# Patient Record
Sex: FEMALE | Race: ASIAN | Employment: OTHER | URBAN - METROPOLITAN AREA
[De-identification: names, ages, dates, MRNs, and addresses within clinical notes are randomized per-mention and may not be internally consistent; named-entity substitution may affect disease eponyms.]

---

## 2017-01-12 ENCOUNTER — OFFICE VISIT (OUTPATIENT)
Dept: INTERNAL MEDICINE CLINIC | Facility: CLINIC | Age: 77
End: 2017-01-12

## 2017-01-12 VITALS
HEIGHT: 63 IN | OXYGEN SATURATION: 98 % | RESPIRATION RATE: 14 BRPM | HEART RATE: 74 BPM | BODY MASS INDEX: 24.1 KG/M2 | WEIGHT: 136 LBS | SYSTOLIC BLOOD PRESSURE: 136 MMHG | TEMPERATURE: 98 F | DIASTOLIC BLOOD PRESSURE: 72 MMHG

## 2017-01-12 DIAGNOSIS — I10 ESSENTIAL HYPERTENSION: Chronic | ICD-10-CM

## 2017-01-12 DIAGNOSIS — J20.9 ACUTE BRONCHITIS, UNSPECIFIED ORGANISM: Primary | ICD-10-CM

## 2017-01-12 DIAGNOSIS — E78.5 HYPERLIPIDEMIA, UNSPECIFIED HYPERLIPIDEMIA TYPE: Chronic | ICD-10-CM

## 2017-01-12 LAB — STREP GRP A CUL-SCR: NEGATIVE

## 2017-01-12 PROCEDURE — 99213 OFFICE O/P EST LOW 20 MIN: CPT | Performed by: INTERNAL MEDICINE

## 2017-01-12 RX ORDER — ALENDRONATE SODIUM 70 MG/1
70 TABLET ORAL WEEKLY
COMMUNITY
End: 2017-09-22

## 2017-01-12 RX ORDER — CODEINE PHOSPHATE AND GUAIFENESIN 10; 100 MG/5ML; MG/5ML
5 SOLUTION ORAL EVERY 6 HOURS PRN
Qty: 240 ML | Refills: 0 | Status: SHIPPED | OUTPATIENT
Start: 2017-01-12 | End: 2017-01-22

## 2017-01-12 NOTE — PROGRESS NOTES
Magaly Almodovar  1940 is a 78year old female who presents for upper respiratory symptoms    Patient presents with:  Cough: pt c/o a dry cough sore throat pt denies any fever x 5 days         HPI:   Pt reports  respiratory symptoms for few days  N suspicious lesions  EYES:PERRLA, EOMI intact,conjunctiva are clear  ENT: ears neg throat neg  NECK: supple, neg adenopathy,no bruits  LUNGS: clear to auscultation. air entry equal.no chest wall tenderness.  wheeze neg  CARDIO: RRR without murmur  GI: good BS

## 2017-01-16 RX ORDER — ATORVASTATIN CALCIUM 10 MG/1
TABLET, FILM COATED ORAL
Qty: 30 TABLET | Refills: 0 | Status: SHIPPED | OUTPATIENT
Start: 2017-01-16 | End: 2017-02-20

## 2017-02-16 ENCOUNTER — APPOINTMENT (OUTPATIENT)
Dept: LAB | Age: 77
End: 2017-02-16
Attending: INTERNAL MEDICINE
Payer: MEDICARE

## 2017-02-16 DIAGNOSIS — I10 ESSENTIAL HYPERTENSION: Chronic | ICD-10-CM

## 2017-02-16 DIAGNOSIS — E78.5 HYPERLIPIDEMIA, UNSPECIFIED HYPERLIPIDEMIA TYPE: Chronic | ICD-10-CM

## 2017-02-16 LAB
ALBUMIN SERPL-MCNC: 4 G/DL (ref 3.5–4.8)
ALP LIVER SERPL-CCNC: 55 U/L (ref 55–142)
ALT SERPL-CCNC: 33 U/L (ref 14–54)
AST SERPL-CCNC: 23 U/L (ref 15–41)
BILIRUB SERPL-MCNC: 0.6 MG/DL (ref 0.1–2)
BUN BLD-MCNC: 11 MG/DL (ref 8–20)
CALCIUM BLD-MCNC: 8.9 MG/DL (ref 8.3–10.3)
CHLORIDE: 106 MMOL/L (ref 101–111)
CHOLEST SMN-MCNC: 132 MG/DL (ref ?–200)
CO2: 29 MMOL/L (ref 22–32)
CREAT BLD-MCNC: 0.86 MG/DL (ref 0.55–1.02)
GLUCOSE BLD-MCNC: 114 MG/DL (ref 70–99)
HDLC SERPL-MCNC: 68 MG/DL (ref 45–?)
HDLC SERPL: 1.94 {RATIO} (ref ?–4.44)
LDLC SERPL CALC-MCNC: 46 MG/DL (ref ?–130)
M PROTEIN MFR SERPL ELPH: 7.5 G/DL (ref 6.1–8.3)
NONHDLC SERPL-MCNC: 64 MG/DL (ref ?–130)
POTASSIUM SERPL-SCNC: 4.6 MMOL/L (ref 3.6–5.1)
SODIUM SERPL-SCNC: 141 MMOL/L (ref 136–144)
TRIGLYCERIDES: 88 MG/DL (ref ?–150)
VLDL: 18 MG/DL (ref 5–40)

## 2017-02-16 PROCEDURE — 80061 LIPID PANEL: CPT | Performed by: INTERNAL MEDICINE

## 2017-02-16 PROCEDURE — 80053 COMPREHEN METABOLIC PANEL: CPT | Performed by: INTERNAL MEDICINE

## 2017-02-16 PROCEDURE — 36415 COLL VENOUS BLD VENIPUNCTURE: CPT | Performed by: INTERNAL MEDICINE

## 2017-02-20 DIAGNOSIS — R73.09 ELEVATED HEMOGLOBIN A1C: Primary | ICD-10-CM

## 2017-02-20 RX ORDER — ATORVASTATIN CALCIUM 10 MG/1
TABLET, FILM COATED ORAL
Qty: 30 TABLET | Refills: 0 | Status: SHIPPED | OUTPATIENT
Start: 2017-02-20 | End: 2017-03-21

## 2017-03-02 ENCOUNTER — APPOINTMENT (OUTPATIENT)
Dept: LAB | Age: 77
End: 2017-03-02
Attending: INTERNAL MEDICINE
Payer: MEDICARE

## 2017-03-02 ENCOUNTER — OFFICE VISIT (OUTPATIENT)
Dept: INTERNAL MEDICINE CLINIC | Facility: CLINIC | Age: 77
End: 2017-03-02

## 2017-03-02 VITALS
WEIGHT: 136.5 LBS | DIASTOLIC BLOOD PRESSURE: 96 MMHG | HEIGHT: 63 IN | OXYGEN SATURATION: 97 % | BODY MASS INDEX: 24.19 KG/M2 | TEMPERATURE: 98 F | SYSTOLIC BLOOD PRESSURE: 180 MMHG | RESPIRATION RATE: 16 BRPM | HEART RATE: 68 BPM

## 2017-03-02 DIAGNOSIS — I10 ESSENTIAL HYPERTENSION: Primary | Chronic | ICD-10-CM

## 2017-03-02 DIAGNOSIS — R73.09 ELEVATED HEMOGLOBIN A1C: ICD-10-CM

## 2017-03-02 LAB
EST. AVERAGE GLUCOSE BLD GHB EST-MCNC: 134 MG/DL (ref 68–126)
HBA1C MFR BLD HPLC: 6.3 % (ref ?–5.7)

## 2017-03-02 PROCEDURE — 36415 COLL VENOUS BLD VENIPUNCTURE: CPT

## 2017-03-02 PROCEDURE — 99213 OFFICE O/P EST LOW 20 MIN: CPT | Performed by: INTERNAL MEDICINE

## 2017-03-02 PROCEDURE — 83036 HEMOGLOBIN GLYCOSYLATED A1C: CPT

## 2017-03-02 RX ORDER — AMLODIPINE BESYLATE AND BENAZEPRIL HYDROCHLORIDE 5; 10 MG/1; MG/1
1 CAPSULE ORAL DAILY
Qty: 30 CAPSULE | Refills: 11 | Status: SHIPPED | OUTPATIENT
Start: 2017-03-02 | End: 2018-04-30

## 2017-03-02 RX ORDER — ATENOLOL 25 MG/1
50 TABLET ORAL
Qty: 90 TABLET | Refills: 1 | COMMUNITY
Start: 2017-03-02 | End: 2017-03-07

## 2017-03-02 NOTE — PATIENT INSTRUCTIONS
Patient take add on  meds today. HTN  Monitor blood pressure twice a day and send or call office with readings.

## 2017-03-02 NOTE — PROGRESS NOTES
Anderson Florian [facility] 1940 is a 78year old female.     Patient presents with:  Blood Pressure       HPI:   bp ck   Numbers have been high at home    Current Outpatient Prescriptions:  atenolol 25 MG Oral Tab Take 2 tablets (50 mg total) by mouth once d Distal Pulses Palpable: yes. Edema: none visible . Gallop: no .   Heart sounds: normal S1S2. Murmurs: none. Rhythm: regular. LUNGS:   Airflow: normal air movement. Auscultation: no wheezing/rhonchi/rales.    Breath sounds bilaterally: symmetri

## 2017-03-07 ENCOUNTER — OFFICE VISIT (OUTPATIENT)
Dept: INTERNAL MEDICINE CLINIC | Facility: CLINIC | Age: 77
End: 2017-03-07

## 2017-03-07 VITALS
BODY MASS INDEX: 24.23 KG/M2 | OXYGEN SATURATION: 98 % | HEIGHT: 63 IN | DIASTOLIC BLOOD PRESSURE: 80 MMHG | TEMPERATURE: 99 F | WEIGHT: 136.75 LBS | HEART RATE: 61 BPM | SYSTOLIC BLOOD PRESSURE: 126 MMHG | RESPIRATION RATE: 16 BRPM

## 2017-03-07 DIAGNOSIS — I10 ESSENTIAL HYPERTENSION: Primary | Chronic | ICD-10-CM

## 2017-03-07 PROCEDURE — 99213 OFFICE O/P EST LOW 20 MIN: CPT | Performed by: INTERNAL MEDICINE

## 2017-03-07 RX ORDER — ATENOLOL 50 MG/1
50 TABLET ORAL DAILY
Qty: 90 TABLET | Refills: 3 | COMMUNITY
Start: 2017-03-07 | End: 2017-03-16

## 2017-03-07 NOTE — PROGRESS NOTES
Chirag Ramos  1940 is a 78year old female. Patient presents with:   Follow - Up: blood pressure       HPI:   bp ck   Numbers have been high at home    Current Outpatient Prescriptions:  atenolol 50 MG Oral Tab Take 1 tablet (50 mg total) by unremarkable. Mouth: unremarkable. Nasal septum: midline. Pharynx: normal.   Sinuses: non-tender. HEART:   Clicks: no.   Distal Pulses Palpable: yes. Edema: none visible . Gallop: no .   Heart sounds: normal S1S2. Murmurs: none.    Rhythm: reg

## 2017-03-16 ENCOUNTER — OFFICE VISIT (OUTPATIENT)
Dept: INTERNAL MEDICINE CLINIC | Facility: CLINIC | Age: 77
End: 2017-03-16

## 2017-03-16 VITALS
WEIGHT: 137 LBS | HEART RATE: 63 BPM | DIASTOLIC BLOOD PRESSURE: 78 MMHG | TEMPERATURE: 98 F | SYSTOLIC BLOOD PRESSURE: 112 MMHG | OXYGEN SATURATION: 98 % | BODY MASS INDEX: 24 KG/M2 | RESPIRATION RATE: 14 BRPM

## 2017-03-16 DIAGNOSIS — I10 ESSENTIAL HYPERTENSION: Chronic | ICD-10-CM

## 2017-03-16 DIAGNOSIS — R05.9 COUGH: Primary | ICD-10-CM

## 2017-03-16 PROCEDURE — 99213 OFFICE O/P EST LOW 20 MIN: CPT | Performed by: INTERNAL MEDICINE

## 2017-03-16 RX ORDER — BUPRENORPHINE HCL 8 MG/1
1 TABLET SUBLINGUAL DAILY
COMMUNITY
End: 2020-09-17

## 2017-03-16 RX ORDER — LEVOFLOXACIN 500 MG/1
500 TABLET, FILM COATED ORAL DAILY
Qty: 10 TABLET | Refills: 0 | Status: SHIPPED | OUTPATIENT
Start: 2017-03-16 | End: 2017-03-26

## 2017-03-16 RX ORDER — ATENOLOL 50 MG/1
50 TABLET ORAL DAILY
Qty: 90 TABLET | Refills: 3 | Status: SHIPPED | OUTPATIENT
Start: 2017-03-16 | End: 2017-09-15 | Stop reason: RX

## 2017-03-16 NOTE — PROGRESS NOTES
Chirag Ramos  1940 is a 78year old female who presents for upper respiratory symptoms    Patient presents with:  Cough: pt c/o a productive cough and a sore throat pt denies any fever        HPI:   Pt reports  respiratory symptoms for few days pain  NEURO: denies headaches    EXAM:   /78 mmHg  Pulse 63  Temp(Src) 98.3 °F (36.8 °C) (Oral)  Resp 14  Wt 137 lb  SpO2 98%  Breastfeeding?  No  GENERAL: well developed, well nourished,in no apparent distress  SKIN: no rashes,no suspicious lesions

## 2017-03-21 RX ORDER — ATORVASTATIN CALCIUM 10 MG/1
TABLET, FILM COATED ORAL
Qty: 30 TABLET | Refills: 0 | Status: SHIPPED | OUTPATIENT
Start: 2017-03-21 | End: 2017-07-16

## 2017-07-17 RX ORDER — ATORVASTATIN CALCIUM 10 MG/1
TABLET, FILM COATED ORAL
Qty: 90 TABLET | Refills: 0 | Status: SHIPPED | OUTPATIENT
Start: 2017-07-17 | End: 2017-10-20

## 2017-08-28 ENCOUNTER — TELEPHONE (OUTPATIENT)
Dept: INTERNAL MEDICINE CLINIC | Facility: CLINIC | Age: 77
End: 2017-08-28

## 2017-08-28 DIAGNOSIS — E78.5 HYPERLIPIDEMIA, UNSPECIFIED HYPERLIPIDEMIA TYPE: Chronic | ICD-10-CM

## 2017-08-28 DIAGNOSIS — R73.09 ELEVATED HEMOGLOBIN A1C: ICD-10-CM

## 2017-08-28 DIAGNOSIS — Z00.00 BLOOD TESTS FOR ROUTINE GENERAL PHYSICAL EXAMINATION: Primary | ICD-10-CM

## 2017-08-28 DIAGNOSIS — E55.9 VITAMIN D DEFICIENCY: Chronic | ICD-10-CM

## 2017-08-28 NOTE — TELEPHONE ENCOUNTER
Patient requesting her lab work orders (6 month re-check) to be inputted in the system. Please call pt with order status.

## 2017-08-30 ENCOUNTER — LAB ENCOUNTER (OUTPATIENT)
Dept: LAB | Age: 77
End: 2017-08-30
Attending: INTERNAL MEDICINE
Payer: MEDICARE

## 2017-08-30 DIAGNOSIS — E78.5 HYPERLIPIDEMIA, UNSPECIFIED HYPERLIPIDEMIA TYPE: Chronic | ICD-10-CM

## 2017-08-30 DIAGNOSIS — Z00.00 BLOOD TESTS FOR ROUTINE GENERAL PHYSICAL EXAMINATION: ICD-10-CM

## 2017-08-30 DIAGNOSIS — R73.09 ELEVATED HEMOGLOBIN A1C: ICD-10-CM

## 2017-08-30 DIAGNOSIS — E55.9 VITAMIN D DEFICIENCY: Chronic | ICD-10-CM

## 2017-08-30 LAB
25-HYDROXYVITAMIN D (TOTAL): 43.3 NG/ML (ref 30–100)
ALBUMIN SERPL-MCNC: 3.9 G/DL (ref 3.5–4.8)
ALP LIVER SERPL-CCNC: 54 U/L (ref 55–142)
ALT SERPL-CCNC: 27 U/L (ref 14–54)
AST SERPL-CCNC: 16 U/L (ref 15–41)
BASOPHILS # BLD AUTO: 0.06 X10(3) UL (ref 0–0.1)
BASOPHILS NFR BLD AUTO: 1.1 %
BILIRUB SERPL-MCNC: 0.6 MG/DL (ref 0.1–2)
BILIRUB UR QL STRIP.AUTO: NEGATIVE
BUN BLD-MCNC: 14 MG/DL (ref 8–20)
CALCIUM BLD-MCNC: 9.1 MG/DL (ref 8.3–10.3)
CHLORIDE: 107 MMOL/L (ref 101–111)
CHOLEST SMN-MCNC: 139 MG/DL (ref ?–200)
CLARITY UR REFRACT.AUTO: CLEAR
CO2: 28 MMOL/L (ref 22–32)
COLOR UR AUTO: YELLOW
CREAT BLD-MCNC: 0.83 MG/DL (ref 0.55–1.02)
EOSINOPHIL # BLD AUTO: 0.19 X10(3) UL (ref 0–0.3)
EOSINOPHIL NFR BLD AUTO: 3.6 %
ERYTHROCYTE [DISTWIDTH] IN BLOOD BY AUTOMATED COUNT: 12.5 % (ref 11.5–16)
EST. AVERAGE GLUCOSE BLD GHB EST-MCNC: 131 MG/DL (ref 68–126)
GLUCOSE BLD-MCNC: 106 MG/DL (ref 70–99)
GLUCOSE UR STRIP.AUTO-MCNC: NEGATIVE MG/DL
HBA1C MFR BLD HPLC: 6.2 % (ref ?–5.7)
HCT VFR BLD AUTO: 41 % (ref 34–50)
HDLC SERPL-MCNC: 66 MG/DL (ref 45–?)
HDLC SERPL: 2.11 {RATIO} (ref ?–4.44)
HGB BLD-MCNC: 13.2 G/DL (ref 12–16)
IMMATURE GRANULOCYTE COUNT: 0.01 X10(3) UL (ref 0–1)
IMMATURE GRANULOCYTE RATIO %: 0.2 %
KETONES UR STRIP.AUTO-MCNC: NEGATIVE MG/DL
LDLC SERPL CALC-MCNC: 53 MG/DL (ref ?–130)
LDLC SERPL-MCNC: 20 MG/DL (ref 5–40)
LEUKOCYTE ESTERASE UR QL STRIP.AUTO: NEGATIVE
LYMPHOCYTES # BLD AUTO: 1.57 X10(3) UL (ref 0.9–4)
LYMPHOCYTES NFR BLD AUTO: 29.8 %
M PROTEIN MFR SERPL ELPH: 7.7 G/DL (ref 6.1–8.3)
MCH RBC QN AUTO: 32.2 PG (ref 27–33.2)
MCHC RBC AUTO-ENTMCNC: 32.2 G/DL (ref 31–37)
MCV RBC AUTO: 100 FL (ref 81–100)
MONOCYTES # BLD AUTO: 0.53 X10(3) UL (ref 0.1–0.6)
MONOCYTES NFR BLD AUTO: 10.1 %
NEUTROPHIL ABS PRELIM: 2.91 X10 (3) UL (ref 1.3–6.7)
NEUTROPHILS # BLD AUTO: 2.91 X10(3) UL (ref 1.3–6.7)
NEUTROPHILS NFR BLD AUTO: 55.2 %
NITRITE UR QL STRIP.AUTO: NEGATIVE
NONHDLC SERPL-MCNC: 73 MG/DL (ref ?–130)
PH UR STRIP.AUTO: 7 [PH] (ref 4.5–8)
PLATELET # BLD AUTO: 218 10(3)UL (ref 150–450)
POTASSIUM SERPL-SCNC: 4.5 MMOL/L (ref 3.6–5.1)
PROT UR STRIP.AUTO-MCNC: NEGATIVE MG/DL
RBC # BLD AUTO: 4.1 X10(6)UL (ref 3.8–5.1)
RED CELL DISTRIBUTION WIDTH-SD: 46.3 FL (ref 35.1–46.3)
SODIUM SERPL-SCNC: 144 MMOL/L (ref 136–144)
SP GR UR STRIP.AUTO: 1.01 (ref 1–1.03)
THYROXINE (T4): 9 UG/DL (ref 4.5–10.9)
TRIGLYCERIDES: 102 MG/DL (ref ?–150)
UROBILINOGEN UR STRIP.AUTO-MCNC: <2 MG/DL
WBC # BLD AUTO: 5.3 X10(3) UL (ref 4–13)

## 2017-08-30 PROCEDURE — 83036 HEMOGLOBIN GLYCOSYLATED A1C: CPT | Performed by: INTERNAL MEDICINE

## 2017-08-30 PROCEDURE — 80061 LIPID PANEL: CPT | Performed by: INTERNAL MEDICINE

## 2017-08-30 PROCEDURE — 81001 URINALYSIS AUTO W/SCOPE: CPT | Performed by: INTERNAL MEDICINE

## 2017-08-30 PROCEDURE — 36415 COLL VENOUS BLD VENIPUNCTURE: CPT | Performed by: INTERNAL MEDICINE

## 2017-08-30 PROCEDURE — 85025 COMPLETE CBC W/AUTO DIFF WBC: CPT | Performed by: INTERNAL MEDICINE

## 2017-08-30 PROCEDURE — 82306 VITAMIN D 25 HYDROXY: CPT | Performed by: INTERNAL MEDICINE

## 2017-08-30 PROCEDURE — 80053 COMPREHEN METABOLIC PANEL: CPT | Performed by: INTERNAL MEDICINE

## 2017-08-30 PROCEDURE — 84436 ASSAY OF TOTAL THYROXINE: CPT | Performed by: INTERNAL MEDICINE

## 2017-09-15 ENCOUNTER — TELEPHONE (OUTPATIENT)
Dept: INTERNAL MEDICINE CLINIC | Facility: CLINIC | Age: 77
End: 2017-09-15

## 2017-09-15 RX ORDER — METOPROLOL SUCCINATE 50 MG/1
50 TABLET, EXTENDED RELEASE ORAL DAILY
Qty: 90 TABLET | Refills: 3 | Status: SHIPPED | OUTPATIENT
Start: 2017-09-15 | End: 2018-09-03

## 2017-09-15 NOTE — TELEPHONE ENCOUNTER
[facility] called to request change of medication. Pharmacy suggested Atenolol be changed to Metoprolol.

## 2017-09-19 DIAGNOSIS — M85.80 OSTEOPENIA: ICD-10-CM

## 2017-09-19 RX ORDER — CHOLECALCIFEROL (VITAMIN D3) 125 MCG
CAPSULE ORAL
Qty: 90 TABLET | Refills: 0 | Status: SHIPPED | OUTPATIENT
Start: 2017-09-19 | End: 2017-11-13

## 2017-09-22 ENCOUNTER — OFFICE VISIT (OUTPATIENT)
Dept: INTERNAL MEDICINE CLINIC | Facility: CLINIC | Age: 77
End: 2017-09-22

## 2017-09-22 VITALS
HEIGHT: 63 IN | RESPIRATION RATE: 16 BRPM | HEART RATE: 76 BPM | SYSTOLIC BLOOD PRESSURE: 116 MMHG | WEIGHT: 134.5 LBS | DIASTOLIC BLOOD PRESSURE: 68 MMHG | BODY MASS INDEX: 23.83 KG/M2 | OXYGEN SATURATION: 98 % | TEMPERATURE: 98 F

## 2017-09-22 DIAGNOSIS — Z12.31 ENCOUNTER FOR SCREENING MAMMOGRAM FOR MALIGNANT NEOPLASM OF BREAST: ICD-10-CM

## 2017-09-22 DIAGNOSIS — I10 ESSENTIAL HYPERTENSION: Chronic | ICD-10-CM

## 2017-09-22 DIAGNOSIS — Z00.00 ROUTINE GENERAL MEDICAL EXAMINATION AT A HEALTH CARE FACILITY: Primary | ICD-10-CM

## 2017-09-22 PROCEDURE — G0009 ADMIN PNEUMOCOCCAL VACCINE: HCPCS | Performed by: INTERNAL MEDICINE

## 2017-09-22 PROCEDURE — 90670 PCV13 VACCINE IM: CPT | Performed by: INTERNAL MEDICINE

## 2017-09-22 PROCEDURE — 93000 ELECTROCARDIOGRAM COMPLETE: CPT | Performed by: INTERNAL MEDICINE

## 2017-09-22 PROCEDURE — G0439 PPPS, SUBSEQ VISIT: HCPCS | Performed by: INTERNAL MEDICINE

## 2017-09-22 NOTE — PROGRESS NOTES
Leighann GUPTA 1940 is a 78year old female.     Patient presents with:  Physical      HPI:   See below     Current Outpatient Prescriptions:  VITAMIN D3 2000 units Oral Tab TAKE 1 CAPSULE BY MOUTH DAILY Disp: 90 tablet Rfl: 0   Metoprolol Succin exertion), chest congestion, blood-tinged sputum,wheezing. Breathing normal pattern . Cardiovascular:   Patient denies chest pain, shortness of breath/rheumatic fever/murmur/dizzziness cholesterol normal. Leg edema none. Orthopnea none.  Palpitations none BREASTS:   Appearance: symmetrical.   tenderness none. Axilla: unremarkable. Breast Mass: no palpable masses. General: unremarkable. Nipple Abnormality: none. Skin Change: none. LUNGS:   Auscultation: clear .    Chest Shape: normal .   Percuss 3/22/2018).   Beverly Layne MD

## 2017-10-20 RX ORDER — ATORVASTATIN CALCIUM 10 MG/1
TABLET, FILM COATED ORAL
Qty: 90 TABLET | Refills: 0 | Status: SHIPPED | OUTPATIENT
Start: 2017-10-20 | End: 2017-11-13

## 2017-11-13 DIAGNOSIS — M85.80 OSTEOPENIA: ICD-10-CM

## 2017-11-13 RX ORDER — ATORVASTATIN CALCIUM 10 MG/1
TABLET, FILM COATED ORAL
Qty: 90 TABLET | Refills: 0 | Status: SHIPPED | OUTPATIENT
Start: 2017-11-13 | End: 2018-03-27

## 2017-11-13 RX ORDER — CHOLECALCIFEROL (VITAMIN D3) 125 MCG
CAPSULE ORAL
Qty: 90 TABLET | Refills: 0 | Status: SHIPPED | OUTPATIENT
Start: 2017-11-13 | End: 2018-04-02

## 2018-03-28 RX ORDER — ATORVASTATIN CALCIUM 10 MG/1
TABLET, FILM COATED ORAL
Qty: 90 TABLET | Refills: 0 | Status: SHIPPED | OUTPATIENT
Start: 2018-03-28 | End: 2018-04-02

## 2018-04-02 ENCOUNTER — APPOINTMENT (OUTPATIENT)
Dept: LAB | Age: 78
End: 2018-04-02
Attending: INTERNAL MEDICINE
Payer: MEDICARE

## 2018-04-02 ENCOUNTER — OFFICE VISIT (OUTPATIENT)
Dept: INTERNAL MEDICINE CLINIC | Facility: CLINIC | Age: 78
End: 2018-04-02

## 2018-04-02 VITALS
SYSTOLIC BLOOD PRESSURE: 142 MMHG | DIASTOLIC BLOOD PRESSURE: 84 MMHG | WEIGHT: 137 LBS | BODY MASS INDEX: 24 KG/M2 | RESPIRATION RATE: 16 BRPM | HEART RATE: 62 BPM | OXYGEN SATURATION: 97 % | TEMPERATURE: 98 F

## 2018-04-02 DIAGNOSIS — E78.5 HYPERLIPIDEMIA, UNSPECIFIED HYPERLIPIDEMIA TYPE: Chronic | ICD-10-CM

## 2018-04-02 DIAGNOSIS — R73.03 PREDIABETES: ICD-10-CM

## 2018-04-02 DIAGNOSIS — I10 ESSENTIAL HYPERTENSION: Primary | Chronic | ICD-10-CM

## 2018-04-02 DIAGNOSIS — I10 ESSENTIAL HYPERTENSION: Chronic | ICD-10-CM

## 2018-04-02 PROCEDURE — 36415 COLL VENOUS BLD VENIPUNCTURE: CPT | Performed by: INTERNAL MEDICINE

## 2018-04-02 PROCEDURE — 83036 HEMOGLOBIN GLYCOSYLATED A1C: CPT | Performed by: INTERNAL MEDICINE

## 2018-04-02 PROCEDURE — 80048 BASIC METABOLIC PNL TOTAL CA: CPT | Performed by: INTERNAL MEDICINE

## 2018-04-02 PROCEDURE — 99213 OFFICE O/P EST LOW 20 MIN: CPT | Performed by: INTERNAL MEDICINE

## 2018-04-02 PROCEDURE — 80061 LIPID PANEL: CPT | Performed by: INTERNAL MEDICINE

## 2018-04-02 RX ORDER — ATORVASTATIN CALCIUM 10 MG/1
10 TABLET, FILM COATED ORAL
Qty: 90 TABLET | Refills: 3 | Status: SHIPPED | OUTPATIENT
Start: 2018-04-02 | End: 2019-10-15

## 2018-04-02 RX ORDER — LOSARTAN POTASSIUM 50 MG/1
50 TABLET ORAL DAILY
Qty: 30 TABLET | Refills: 2 | Status: SHIPPED | OUTPATIENT
Start: 2018-04-02 | End: 2018-04-30 | Stop reason: ALTCHOICE

## 2018-04-02 RX ORDER — METOPROLOL SUCCINATE 50 MG/1
TABLET, EXTENDED RELEASE ORAL
Refills: 3 | COMMUNITY
Start: 2018-02-05 | End: 2018-04-02

## 2018-04-02 RX ORDER — CHOLECALCIFEROL (VITAMIN D3) 125 MCG
1 CAPSULE ORAL
Qty: 90 TABLET | Refills: 3 | Status: SHIPPED | OUTPATIENT
Start: 2018-04-02 | End: 2019-07-18

## 2018-04-02 RX ORDER — METOPROLOL SUCCINATE 50 MG/1
50 TABLET, EXTENDED RELEASE ORAL DAILY
Refills: 3 | COMMUNITY
Start: 2018-04-02 | End: 2018-09-10

## 2018-04-02 NOTE — PROGRESS NOTES
Stephon Lomas  1940 is a 78year old female. Patient presents with: Follow - Up       HPI:   bp ck     Current Outpatient Prescriptions:  aspirin 81 MG Oral Tab Take 1 tablet (81 mg total) by mouth once daily.  Disp: 90 tablet Rfl: 3   Metopr HEART:   Clicks: no.   Distal Pulses Palpable: yes. Edema: none visible . Gallop: no .   Heart sounds: normal S1S2. Murmurs: none. Rhythm: regular. LUNGS:   Airflow: normal air movement. Auscultation: no wheezing/rhonchi/rales.    Breath sound

## 2018-04-30 ENCOUNTER — OFFICE VISIT (OUTPATIENT)
Dept: INTERNAL MEDICINE CLINIC | Facility: CLINIC | Age: 78
End: 2018-04-30

## 2018-04-30 VITALS — HEART RATE: 66 BPM | DIASTOLIC BLOOD PRESSURE: 84 MMHG | SYSTOLIC BLOOD PRESSURE: 146 MMHG

## 2018-04-30 DIAGNOSIS — I10 ESSENTIAL HYPERTENSION: Primary | Chronic | ICD-10-CM

## 2018-04-30 DIAGNOSIS — R73.03 PREDIABETES: ICD-10-CM

## 2018-04-30 PROCEDURE — 99213 OFFICE O/P EST LOW 20 MIN: CPT | Performed by: INTERNAL MEDICINE

## 2018-04-30 RX ORDER — AMLODIPINE BESYLATE AND BENAZEPRIL HYDROCHLORIDE 5; 10 MG/1; MG/1
1 CAPSULE ORAL DAILY
Qty: 30 CAPSULE | Refills: 11 | Status: SHIPPED | OUTPATIENT
Start: 2018-04-30 | End: 2018-05-11

## 2018-04-30 NOTE — PROGRESS NOTES
Chirag Ramos  1940 is a 78year old female. No chief complaint on file. HPI:   bp ck     Current Outpatient Prescriptions:  Amlodipine Besy-Benazepril HCl 5-10 MG Oral Cap Take 1 capsule by mouth daily.  Disp: 30 capsule Rfl: 11   aspir sounds: normal S1S2. Murmurs: none. Rhythm: regular. LUNGS:   Airflow: normal air movement. Auscultation: no wheezing/rhonchi/rales.    Breath sounds bilaterally: symmetrical.       ASSESSMENT AND PLAN:   Diagnoses and all orders for this visit:

## 2018-05-07 ENCOUNTER — TELEPHONE (OUTPATIENT)
Dept: INTERNAL MEDICINE CLINIC | Facility: CLINIC | Age: 78
End: 2018-05-07

## 2018-05-07 DIAGNOSIS — I10 ESSENTIAL HYPERTENSION: Chronic | ICD-10-CM

## 2018-05-07 RX ORDER — AMLODIPINE BESYLATE AND BENAZEPRIL HYDROCHLORIDE 5; 10 MG/1; MG/1
1 CAPSULE ORAL DAILY
Qty: 30 CAPSULE | Refills: 11 | OUTPATIENT
Start: 2018-05-07 | End: 2019-05-02

## 2018-05-07 NOTE — TELEPHONE ENCOUNTER
Pharmacy called requesting new prescription for Amlodipine Besy-Benazepril HCl 5-10 MG Oral Cap    Insurance does not cover 1 year supply.  Needs 2 separate prescriptions for 6 Month supply

## 2018-05-10 NOTE — TELEPHONE ENCOUNTER
Spoke to pharmacy, insurance will not cover Amlodipine/Benazepril together but will cover Amlodipine 5 mgm and separate Benazepril 10 mg, please advise

## 2018-05-11 DIAGNOSIS — I10 ESSENTIAL HYPERTENSION, BENIGN: Primary | ICD-10-CM

## 2018-05-11 RX ORDER — AMLODIPINE BESYLATE 5 MG/1
5 TABLET ORAL DAILY
Qty: 90 TABLET | Refills: 3 | Status: SHIPPED | OUTPATIENT
Start: 2018-05-11 | End: 2018-11-13

## 2018-05-11 RX ORDER — BENAZEPRIL HYDROCHLORIDE 10 MG/1
10 TABLET ORAL DAILY
Qty: 90 TABLET | Refills: 3 | Status: SHIPPED | OUTPATIENT
Start: 2018-05-11 | End: 2018-06-21

## 2018-06-21 ENCOUNTER — OFFICE VISIT (OUTPATIENT)
Dept: INTERNAL MEDICINE CLINIC | Facility: CLINIC | Age: 78
End: 2018-06-21

## 2018-06-21 ENCOUNTER — HOSPITAL ENCOUNTER (OUTPATIENT)
Age: 78
Discharge: HOME | End: 2018-06-21
Attending: FAMILY MEDICINE
Payer: MEDICARE

## 2018-06-21 VITALS
SYSTOLIC BLOOD PRESSURE: 158 MMHG | DIASTOLIC BLOOD PRESSURE: 80 MMHG | HEART RATE: 72 BPM | RESPIRATION RATE: 16 BRPM | WEIGHT: 142 LBS | BODY MASS INDEX: 25 KG/M2 | OXYGEN SATURATION: 98 %

## 2018-06-21 VITALS
DIASTOLIC BLOOD PRESSURE: 85 MMHG | HEART RATE: 61 BPM | SYSTOLIC BLOOD PRESSURE: 160 MMHG | TEMPERATURE: 98 F | RESPIRATION RATE: 16 BRPM | OXYGEN SATURATION: 98 %

## 2018-06-21 DIAGNOSIS — I10 HYPERTENSION, UNCONTROLLED: Primary | ICD-10-CM

## 2018-06-21 DIAGNOSIS — I10 ESSENTIAL HYPERTENSION: Primary | Chronic | ICD-10-CM

## 2018-06-21 PROCEDURE — 99213 OFFICE O/P EST LOW 20 MIN: CPT

## 2018-06-21 PROCEDURE — 99212 OFFICE O/P EST SF 10 MIN: CPT

## 2018-06-21 PROCEDURE — 99213 OFFICE O/P EST LOW 20 MIN: CPT | Performed by: INTERNAL MEDICINE

## 2018-06-21 RX ORDER — LOSARTAN POTASSIUM 50 MG/1
50 TABLET ORAL DAILY
Refills: 2 | COMMUNITY
Start: 2018-06-04 | End: 2018-09-10

## 2018-06-21 RX ORDER — ALENDRONATE SODIUM 70 MG/1
70 TABLET ORAL WEEKLY
Qty: 12 TABLET | Refills: 3 | Status: SHIPPED | OUTPATIENT
Start: 2018-06-21 | End: 2018-10-05

## 2018-06-21 NOTE — ED INITIAL ASSESSMENT (HPI)
Pt took her blood pressure 615 am 165/93 pt has been taking BP every  2mins. Pt took metoprolol @ 658 am recheck  BP  134/75.  Denies any chest pain, shortness of breath, headache,

## 2018-06-21 NOTE — ED PROVIDER NOTES
Patient Seen in: [facility]    History   Patient presents with:  Blood Pressure    Stated Complaint: high blood pressure    HPI  44-year-old female with a friend presents to immediate care for blood pressure check,  Patient has Nose normal.   Mouth/Throat: Oropharynx is clear and moist.   Eyes: Conjunctivae and EOM are normal. Pupils are equal, round, and reactive to light. Neck: Normal range of motion. Neck supple.    Cardiovascular: Normal rate, regular rhythm, normal heart so

## 2018-06-21 NOTE — PROGRESS NOTES
Rogelio Ledbetter  1940 is a 78year old female. Patient presents with:   Follow - Up: BP       HPI:   bp ck   Patient has not been taking her blood pressure medicine has not started the amlodipine as yet    Current Outpatient Prescriptions:  Ileana Pulse 72   Resp 16   Wt 142 lb   SpO2 98%   BMI 25.15 kg/m²   HEENT:   jvp not raised. Ear canals: normal.   Ear drums: normal .   Ears: unremarkable. Mouth: unremarkable. Nasal septum: midline. Pharynx: normal.   Sinuses: non-tender.    HEART:   Cl

## 2018-06-29 RX ORDER — ATORVASTATIN CALCIUM 10 MG/1
TABLET, FILM COATED ORAL
Qty: 90 TABLET | Refills: 0 | Status: SHIPPED | OUTPATIENT
Start: 2018-06-29 | End: 2018-07-05

## 2018-06-29 NOTE — TELEPHONE ENCOUNTER
atorvastatin 10 MG Oral Tab 90 tablet 3 4/2/2018     Sig - Route:  Take 1 tablet (10 mg total) by mouth once daily. - Oral    E-Prescribing Status: Receipt confirmed by pharmacy

## 2018-07-05 ENCOUNTER — OFFICE VISIT (OUTPATIENT)
Dept: INTERNAL MEDICINE CLINIC | Facility: CLINIC | Age: 78
End: 2018-07-05

## 2018-07-05 VITALS
HEIGHT: 61.5 IN | WEIGHT: 140.25 LBS | SYSTOLIC BLOOD PRESSURE: 130 MMHG | BODY MASS INDEX: 26.14 KG/M2 | DIASTOLIC BLOOD PRESSURE: 76 MMHG | RESPIRATION RATE: 12 BRPM | HEART RATE: 64 BPM | TEMPERATURE: 99 F

## 2018-07-05 DIAGNOSIS — I10 ESSENTIAL HYPERTENSION: Primary | Chronic | ICD-10-CM

## 2018-07-05 PROCEDURE — 99213 OFFICE O/P EST LOW 20 MIN: CPT | Performed by: INTERNAL MEDICINE

## 2018-07-05 NOTE — PROGRESS NOTES
Joann Schmitd ALONSO 1940 is a 78year old female. Patient presents with:   Follow - Up: blood pressure       HPI:   bp ck   Patient has not been taking her blood pressure medicine has not started the amlodipine as yet    Current Outpatient Prescrip 130/76 (BP Location: Left arm, Patient Position: Sitting, Cuff Size: adult)   Pulse 64   Temp 98.6 °F (37 °C) (Oral)   Resp 12   Ht 61.5\"   Wt 140 lb 4 oz   BMI 26.07 kg/m²   HEENT:   jvp not raised.    Ear canals: normal.   Ear drums: normal .   Ears: unr

## 2018-07-09 DIAGNOSIS — I10 ESSENTIAL HYPERTENSION: Chronic | ICD-10-CM

## 2018-07-09 RX ORDER — LOSARTAN POTASSIUM 50 MG/1
50 TABLET ORAL DAILY
Qty: 90 TABLET | Refills: 1 | Status: SHIPPED | OUTPATIENT
Start: 2018-07-09 | End: 2018-11-13

## 2018-07-09 NOTE — TELEPHONE ENCOUNTER
Refill requested: Losartan 50     Passed protocol      Last refill: 6/04/2018 Losartan 50 mg - historically entered    Relevant Labs: ULICES 04/02/2018    BP Readings from Last 3 Encounters:  07/05/18 : 130/76  06/21/18 : 158/80  06/21/18 : 160/85      Last O

## 2018-09-05 RX ORDER — METOPROLOL SUCCINATE 50 MG/1
TABLET, EXTENDED RELEASE ORAL
Qty: 90 TABLET | Refills: 0 | Status: SHIPPED | OUTPATIENT
Start: 2018-09-05 | End: 2018-11-13

## 2018-09-05 NOTE — TELEPHONE ENCOUNTER
Protocol passed.      Medication(s) to Refill:   Pending Prescriptions Disp Refills    METOPROLOL SUCCINATE ER 50 MG Oral Tablet 24 Hr [Pharmacy Med Name: METOPROLOL ER SUCCINATE 50MG TABS] 90 tablet 0     Sig: TAKE 1 TABLET(50 MG) BY MOUTH DAILY

## 2018-09-10 ENCOUNTER — OFFICE VISIT (OUTPATIENT)
Dept: INTERNAL MEDICINE CLINIC | Facility: CLINIC | Age: 78
End: 2018-09-10
Payer: MEDICARE

## 2018-09-10 VITALS
OXYGEN SATURATION: 99 % | RESPIRATION RATE: 12 BRPM | HEART RATE: 55 BPM | WEIGHT: 139 LBS | TEMPERATURE: 98 F | BODY MASS INDEX: 26 KG/M2 | DIASTOLIC BLOOD PRESSURE: 80 MMHG | SYSTOLIC BLOOD PRESSURE: 110 MMHG

## 2018-09-10 DIAGNOSIS — I10 ESSENTIAL HYPERTENSION: Primary | Chronic | ICD-10-CM

## 2018-09-10 DIAGNOSIS — R73.03 PREDIABETES: ICD-10-CM

## 2018-09-10 DIAGNOSIS — E78.5 HYPERLIPIDEMIA, UNSPECIFIED HYPERLIPIDEMIA TYPE: Chronic | ICD-10-CM

## 2018-09-10 PROCEDURE — 99213 OFFICE O/P EST LOW 20 MIN: CPT | Performed by: INTERNAL MEDICINE

## 2018-09-10 RX ORDER — TRIAMTERENE AND HYDROCHLOROTHIAZIDE 37.5; 25 MG/1; MG/1
CAPSULE ORAL
Qty: 30 CAPSULE | Refills: 11 | Status: SHIPPED | OUTPATIENT
Start: 2018-09-10 | End: 2018-11-13 | Stop reason: SINTOL

## 2018-09-10 RX ORDER — TRIAMTERENE AND HYDROCHLOROTHIAZIDE 37.5; 25 MG/1; MG/1
1 CAPSULE ORAL EVERY MORNING
Qty: 30 CAPSULE | Refills: 11 | Status: SHIPPED | OUTPATIENT
Start: 2018-09-10 | End: 2018-09-10 | Stop reason: CLARIF

## 2018-09-10 NOTE — PROGRESS NOTES
Manish Paulino [facility] 1940 is a 70year old female.     Patient presents with:  Leg Swelling       HPI:   bp ck   Patient has not been taking her blood pressure medicine-amlodipine in view of pedal edema    Current Outpatient Medications:  Triamterene-H on medication(s). Irregular heart beat no. Leg edema no. Murmurs no. Orthopnea no. EXAM:   /80   Pulse 55   Temp 98.3 °F (36.8 °C) (Oral)   Resp 12   Wt 139 lb   SpO2 99%   BMI 25.84 kg/m²   HEENT:   jvp not raised.    Ear canals: normal.   Ear d

## 2018-09-26 ENCOUNTER — APPOINTMENT (OUTPATIENT)
Dept: LAB | Age: 78
End: 2018-09-26
Attending: INTERNAL MEDICINE
Payer: MEDICARE

## 2018-09-26 DIAGNOSIS — R73.03 PREDIABETES: ICD-10-CM

## 2018-09-26 DIAGNOSIS — E78.5 HYPERLIPIDEMIA, UNSPECIFIED HYPERLIPIDEMIA TYPE: Chronic | ICD-10-CM

## 2018-09-26 LAB
ALBUMIN SERPL-MCNC: 3.9 G/DL (ref 3.5–4.8)
ALBUMIN/GLOB SERPL: 1.1 {RATIO} (ref 1–2)
ALP LIVER SERPL-CCNC: 44 U/L (ref 55–142)
ALT SERPL-CCNC: 34 U/L (ref 14–54)
ANION GAP SERPL CALC-SCNC: 8 MMOL/L (ref 0–18)
AST SERPL-CCNC: 25 U/L (ref 15–41)
BILIRUB SERPL-MCNC: 0.5 MG/DL (ref 0.1–2)
BUN BLD-MCNC: 10 MG/DL (ref 8–20)
BUN/CREAT SERPL: 11.4 (ref 10–20)
CALCIUM BLD-MCNC: 8.9 MG/DL (ref 8.3–10.3)
CHLORIDE SERPL-SCNC: 108 MMOL/L (ref 101–111)
CHOLEST SMN-MCNC: 139 MG/DL (ref ?–200)
CO2 SERPL-SCNC: 25 MMOL/L (ref 22–32)
CREAT BLD-MCNC: 0.88 MG/DL (ref 0.55–1.02)
EST. AVERAGE GLUCOSE BLD GHB EST-MCNC: 134 MG/DL (ref 68–126)
GLOBULIN PLAS-MCNC: 3.7 G/DL (ref 2.5–4)
GLUCOSE BLD-MCNC: 113 MG/DL (ref 70–99)
HBA1C MFR BLD HPLC: 6.3 % (ref ?–5.7)
HDLC SERPL-MCNC: 54 MG/DL (ref 40–59)
LDLC SERPL CALC-MCNC: 64 MG/DL (ref ?–100)
M PROTEIN MFR SERPL ELPH: 7.6 G/DL (ref 6.1–8.3)
NONHDLC SERPL-MCNC: 85 MG/DL (ref ?–130)
OSMOLALITY SERPL CALC.SUM OF ELEC: 292 MOSM/KG (ref 275–295)
POTASSIUM SERPL-SCNC: 4.4 MMOL/L (ref 3.6–5.1)
SODIUM SERPL-SCNC: 141 MMOL/L (ref 136–144)
TRIGL SERPL-MCNC: 106 MG/DL (ref 30–149)
VLDLC SERPL CALC-MCNC: 21 MG/DL (ref 0–30)

## 2018-09-26 PROCEDURE — 36415 COLL VENOUS BLD VENIPUNCTURE: CPT

## 2018-09-26 PROCEDURE — 80053 COMPREHEN METABOLIC PANEL: CPT

## 2018-09-26 PROCEDURE — 80061 LIPID PANEL: CPT

## 2018-09-26 PROCEDURE — 83036 HEMOGLOBIN GLYCOSYLATED A1C: CPT

## 2018-10-05 ENCOUNTER — OFFICE VISIT (OUTPATIENT)
Dept: INTERNAL MEDICINE CLINIC | Facility: CLINIC | Age: 78
End: 2018-10-05
Payer: MEDICARE

## 2018-10-05 ENCOUNTER — APPOINTMENT (OUTPATIENT)
Dept: LAB | Age: 78
End: 2018-10-05
Attending: INTERNAL MEDICINE
Payer: MEDICARE

## 2018-10-05 VITALS
SYSTOLIC BLOOD PRESSURE: 124 MMHG | OXYGEN SATURATION: 96 % | RESPIRATION RATE: 12 BRPM | BODY MASS INDEX: 26 KG/M2 | HEART RATE: 64 BPM | DIASTOLIC BLOOD PRESSURE: 88 MMHG | TEMPERATURE: 98 F | WEIGHT: 139.5 LBS

## 2018-10-05 DIAGNOSIS — R07.89 SENSATION OF CHEST PRESSURE: ICD-10-CM

## 2018-10-05 DIAGNOSIS — I10 ESSENTIAL HYPERTENSION: Chronic | ICD-10-CM

## 2018-10-05 DIAGNOSIS — R07.89 SENSATION OF CHEST PRESSURE: Primary | ICD-10-CM

## 2018-10-05 DIAGNOSIS — E78.5 HYPERLIPIDEMIA, UNSPECIFIED HYPERLIPIDEMIA TYPE: Chronic | ICD-10-CM

## 2018-10-05 PROCEDURE — 93005 ELECTROCARDIOGRAM TRACING: CPT

## 2018-10-05 PROCEDURE — 93010 ELECTROCARDIOGRAM REPORT: CPT | Performed by: INTERNAL MEDICINE

## 2018-10-05 PROCEDURE — 99214 OFFICE O/P EST MOD 30 MIN: CPT | Performed by: INTERNAL MEDICINE

## 2018-10-05 RX ORDER — ALENDRONATE SODIUM 70 MG/1
TABLET ORAL
Qty: 12 TABLET | Refills: 3 | Status: SHIPPED | OUTPATIENT
Start: 2018-10-05 | End: 2019-11-22

## 2018-10-05 NOTE — PROGRESS NOTES
Toño Montoya  925/1940 70year old female. Patient presents with: Follow - Up    Pressure-like sensation in the mid chest 2 weeks ago  HPI:     Chest Pain:   The chest pain is also described as that does not radiate .    The chest pain occurs occa General/Constitutional:   Patient denies fever , chills , night sweats , hemoptysis , weight loss . Respiratory:   Coughing up blood no. Shortness of breath when lying flat no. fever no. Blood-tinged sputum no. Chest congestion none. Cough none.  DO (CPT=71046); Future  -     Cancel: ELECTROCARDIOGRAM, COMPLETE  -     CARD ECHO STRESS ECHO/REST AND STRESS(CPT=93350/28300 DM 33533); Future  -     EKG 12-LEAD;  Future    Hyperlipidemia, unspecified hyperlipidemia type  -     CARD ECHO STRESS ECHO/REST A

## 2018-10-05 NOTE — PATIENT INSTRUCTIONS
HOld alendronate  To ER if symptoms signs worsen  Will first obtain an outpatient EKG prior to proceeding with cardiac testing

## 2018-10-09 ENCOUNTER — TELEPHONE (OUTPATIENT)
Dept: INTERNAL MEDICINE CLINIC | Facility: CLINIC | Age: 78
End: 2018-10-09

## 2018-10-12 ENCOUNTER — HOSPITAL ENCOUNTER (OUTPATIENT)
Dept: GENERAL RADIOLOGY | Age: 78
Discharge: HOME | End: 2018-10-12
Attending: INTERNAL MEDICINE
Payer: MEDICARE

## 2018-10-12 DIAGNOSIS — R07.89 SENSATION OF CHEST PRESSURE: ICD-10-CM

## 2018-10-16 ENCOUNTER — HOSPITAL ENCOUNTER (OUTPATIENT)
Dept: CV DIAGNOSTICS | Facility: HOSPITAL | Age: 78
Discharge: HOME | End: 2018-10-16
Attending: INTERNAL MEDICINE
Payer: MEDICARE

## 2018-10-16 DIAGNOSIS — R07.89 SENSATION OF CHEST PRESSURE: ICD-10-CM

## 2018-10-16 DIAGNOSIS — E78.5 HYPERLIPIDEMIA, UNSPECIFIED HYPERLIPIDEMIA TYPE: ICD-10-CM

## 2018-10-16 DIAGNOSIS — I10 ESSENTIAL HYPERTENSION: ICD-10-CM

## 2018-10-16 PROCEDURE — 93350 STRESS TTE ONLY: CPT | Performed by: INTERNAL MEDICINE

## 2018-10-16 PROCEDURE — 93017 CV STRESS TEST TRACING ONLY: CPT | Performed by: INTERNAL MEDICINE

## 2018-10-16 PROCEDURE — 93018 CV STRESS TEST I&R ONLY: CPT | Performed by: INTERNAL MEDICINE

## 2018-10-25 ENCOUNTER — TELEPHONE (OUTPATIENT)
Dept: INTERNAL MEDICINE CLINIC | Facility: CLINIC | Age: 78
End: 2018-10-25

## 2018-10-25 NOTE — TELEPHONE ENCOUNTER
Pt requesting copy of recent lab results. Verified address. Copy of labs placed at  outgoing mail bin.

## 2018-10-26 ENCOUNTER — HOSPITAL ENCOUNTER (OUTPATIENT)
Dept: GENERAL RADIOLOGY | Age: 78
Discharge: HOME | End: 2018-10-26
Attending: INTERNAL MEDICINE
Payer: MEDICARE

## 2018-10-26 PROCEDURE — 71046 X-RAY EXAM CHEST 2 VIEWS: CPT | Performed by: INTERNAL MEDICINE

## 2018-11-13 ENCOUNTER — OFFICE VISIT (OUTPATIENT)
Dept: INTERNAL MEDICINE CLINIC | Facility: CLINIC | Age: 78
End: 2018-11-13
Payer: MEDICARE

## 2018-11-13 VITALS
BODY MASS INDEX: 24.63 KG/M2 | DIASTOLIC BLOOD PRESSURE: 80 MMHG | HEART RATE: 65 BPM | RESPIRATION RATE: 16 BRPM | HEIGHT: 63 IN | OXYGEN SATURATION: 99 % | WEIGHT: 139 LBS | SYSTOLIC BLOOD PRESSURE: 136 MMHG | TEMPERATURE: 98 F

## 2018-11-13 DIAGNOSIS — I10 ESSENTIAL HYPERTENSION: Chronic | ICD-10-CM

## 2018-11-13 PROCEDURE — 99213 OFFICE O/P EST LOW 20 MIN: CPT | Performed by: INTERNAL MEDICINE

## 2018-11-13 RX ORDER — METOPROLOL SUCCINATE 50 MG/1
75 TABLET, EXTENDED RELEASE ORAL DAILY
Qty: 90 TABLET | Refills: 0 | COMMUNITY
Start: 2018-11-13 | End: 2018-11-29

## 2018-11-13 RX ORDER — LOSARTAN POTASSIUM 100 MG/1
100 TABLET ORAL DAILY
Qty: 90 TABLET | Refills: 0 | COMMUNITY
Start: 2018-11-13 | End: 2019-04-25 | Stop reason: ALTCHOICE

## 2018-11-13 NOTE — PROGRESS NOTES
Mendy Ruiz  1940 is a 70year old female. Patient presents with:  Lab Results  Medication Follow-Up       HPI:   bp ck       Current Outpatient Medications:  Losartan Potassium 100 MG Oral Tab Take 1 tablet (100 mg total) by mouth daily.  D unremarkable. Mouth: unremarkable. Nasal septum: midline. Pharynx: normal.   Sinuses: non-tender. HEART:   Clicks: no.   Distal Pulses Palpable: yes. Edema: Trace  Gallop: no .   Heart sounds: normal S1S2. Murmurs: none. Rhythm: regular.    ELISE

## 2018-11-29 ENCOUNTER — OFFICE VISIT (OUTPATIENT)
Dept: INTERNAL MEDICINE CLINIC | Facility: CLINIC | Age: 78
End: 2018-11-29
Payer: MEDICARE

## 2018-11-29 ENCOUNTER — LAB ENCOUNTER (OUTPATIENT)
Dept: LAB | Age: 78
End: 2018-11-29
Attending: INTERNAL MEDICINE
Payer: MEDICARE

## 2018-11-29 VITALS
OXYGEN SATURATION: 99 % | HEIGHT: 61.5 IN | DIASTOLIC BLOOD PRESSURE: 84 MMHG | TEMPERATURE: 98 F | WEIGHT: 138 LBS | RESPIRATION RATE: 12 BRPM | SYSTOLIC BLOOD PRESSURE: 156 MMHG | HEART RATE: 67 BPM | BODY MASS INDEX: 25.72 KG/M2

## 2018-11-29 DIAGNOSIS — Z00.00 ROUTINE GENERAL MEDICAL EXAMINATION AT A HEALTH CARE FACILITY: ICD-10-CM

## 2018-11-29 DIAGNOSIS — Z00.00 ROUTINE GENERAL MEDICAL EXAMINATION AT A HEALTH CARE FACILITY: Primary | ICD-10-CM

## 2018-11-29 DIAGNOSIS — I10 ESSENTIAL HYPERTENSION: ICD-10-CM

## 2018-11-29 DIAGNOSIS — E55.9 VITAMIN D DEFICIENCY: ICD-10-CM

## 2018-11-29 PROCEDURE — 84436 ASSAY OF TOTAL THYROXINE: CPT

## 2018-11-29 PROCEDURE — 36415 COLL VENOUS BLD VENIPUNCTURE: CPT

## 2018-11-29 PROCEDURE — 99212 OFFICE O/P EST SF 10 MIN: CPT | Performed by: INTERNAL MEDICINE

## 2018-11-29 PROCEDURE — 82306 VITAMIN D 25 HYDROXY: CPT

## 2018-11-29 PROCEDURE — 85025 COMPLETE CBC W/AUTO DIFF WBC: CPT

## 2018-11-29 PROCEDURE — G0439 PPPS, SUBSEQ VISIT: HCPCS | Performed by: INTERNAL MEDICINE

## 2018-11-29 PROCEDURE — 84443 ASSAY THYROID STIM HORMONE: CPT

## 2018-11-29 PROCEDURE — 81001 URINALYSIS AUTO W/SCOPE: CPT

## 2018-11-29 RX ORDER — METOPROLOL SUCCINATE 50 MG/1
100 TABLET, EXTENDED RELEASE ORAL DAILY
Qty: 90 TABLET | Refills: 0 | COMMUNITY
Start: 2018-11-29 | End: 2018-12-27 | Stop reason: ALTCHOICE

## 2018-11-29 NOTE — PROGRESS NOTES
Michel Hoffmann  1940 is a 70year old female. Patient presents with:  Physical      HPI:   See below     Current Outpatient Medications:  Metoprolol Succinate ER 50 MG Oral Tablet 24 Hr Take 2 tablets (100 mg total) by mouth daily.  Disp: 90 ta Diabetes none. Thyroid disorder none. Respiratory:   Patient denies chest pain, cough, MUÑIZ (dyspnea on exertion), chest congestion, blood-tinged sputum,wheezing. Breathing normal pattern .    Cardiovascular:   Patient denies chest pain, shortness of kylah Thyroid: unremarkable. HEART:   Clicks: absent . Heart sounds: normal.   Murmurs: none. Rhythm: regular. BREASTS:   Appearance: symmetrical.   tenderness none. Axilla: unremarkable. Breast Mass: no palpable masses. General: unremarkable. 25-HYDROXY; Future    Essential hypertension    BP meds adjusted    Patient Instructions   HTN  Monitor blood pressure twice a day and send or call office with readings. The patient indicates understanding of these issues and agrees to the plan.   The

## 2018-12-03 DIAGNOSIS — I10 ESSENTIAL HYPERTENSION: Chronic | ICD-10-CM

## 2018-12-04 RX ORDER — METOPROLOL SUCCINATE 50 MG/1
TABLET, EXTENDED RELEASE ORAL
Qty: 90 TABLET | Refills: 0 | Status: SHIPPED | OUTPATIENT
Start: 2018-12-04 | End: 2018-12-27 | Stop reason: ALTCHOICE

## 2018-12-04 RX ORDER — LOSARTAN POTASSIUM 50 MG/1
TABLET ORAL
Qty: 90 TABLET | Refills: 0 | Status: SHIPPED | OUTPATIENT
Start: 2018-12-04 | End: 2018-12-27 | Stop reason: ALTCHOICE

## 2018-12-04 NOTE — TELEPHONE ENCOUNTER
Refill requested:   Requested Prescriptions     Pending Prescriptions Disp Refills   • LOSARTAN POTASSIUM 50 MG Oral Tab [Pharmacy Med Name: LOSARTAN 50MG TABLETS] 90 tablet 0     Sig: TAKE 1 TABLET(50 MG) BY MOUTH DAILY   • METOPROLOL SUCCINATE ER 50 MG O

## 2018-12-06 ENCOUNTER — TELEPHONE (OUTPATIENT)
Dept: INTERNAL MEDICINE CLINIC | Facility: CLINIC | Age: 78
End: 2018-12-06

## 2018-12-06 DIAGNOSIS — R31.29 MICROSCOPIC HEMATURIA: Primary | ICD-10-CM

## 2018-12-06 RX ORDER — LOSARTAN POTASSIUM 100 MG/1
100 TABLET ORAL DAILY
Qty: 90 TABLET | Refills: 3 | Status: SHIPPED | OUTPATIENT
Start: 2018-12-06 | End: 2018-12-27 | Stop reason: ALTCHOICE

## 2018-12-06 NOTE — TELEPHONE ENCOUNTER
Notes recorded by Eli Connell MD on 11/30/2018 at 10:06 AM CST  Reviewed results   Patient has blood in the urine.  Microscopic.  This will need evaluation.  Please have the patient see a urologist-dr conde  rest of the blood work is unremarkable    Pt

## 2018-12-06 NOTE — TELEPHONE ENCOUNTER
Pharmacy calling wanting clarification on which dosage patient is taking of Losartan 50 mg or 100mg? Please advise.

## 2018-12-27 ENCOUNTER — LAB ENCOUNTER (OUTPATIENT)
Dept: LAB | Age: 78
End: 2018-12-27
Attending: UROLOGY
Payer: MEDICARE

## 2018-12-27 ENCOUNTER — OFFICE VISIT (OUTPATIENT)
Dept: INTERNAL MEDICINE CLINIC | Facility: CLINIC | Age: 78
End: 2018-12-27
Payer: MEDICARE

## 2018-12-27 VITALS
BODY MASS INDEX: 25.3 KG/M2 | SYSTOLIC BLOOD PRESSURE: 134 MMHG | OXYGEN SATURATION: 96 % | HEART RATE: 64 BPM | DIASTOLIC BLOOD PRESSURE: 70 MMHG | WEIGHT: 137.5 LBS | RESPIRATION RATE: 12 BRPM | HEIGHT: 62 IN | TEMPERATURE: 98 F

## 2018-12-27 DIAGNOSIS — I10 ESSENTIAL HYPERTENSION: Primary | ICD-10-CM

## 2018-12-27 DIAGNOSIS — R31.29 MICROSCOPIC HEMATURIA: ICD-10-CM

## 2018-12-27 PROCEDURE — 84520 ASSAY OF UREA NITROGEN: CPT

## 2018-12-27 PROCEDURE — 36415 COLL VENOUS BLD VENIPUNCTURE: CPT

## 2018-12-27 PROCEDURE — 99213 OFFICE O/P EST LOW 20 MIN: CPT | Performed by: INTERNAL MEDICINE

## 2018-12-27 PROCEDURE — 82565 ASSAY OF CREATININE: CPT

## 2018-12-27 RX ORDER — METOPROLOL SUCCINATE 100 MG/1
100 TABLET, EXTENDED RELEASE ORAL DAILY
Qty: 90 TABLET | Refills: 1 | Status: SHIPPED | OUTPATIENT
Start: 2018-12-27 | End: 2019-07-05

## 2018-12-27 NOTE — PROGRESS NOTES
Magaly Almodovar DOB 1940 is a 70year old female. Patient presents with:  Medication Follow-Up       HPI:   bp ck       Current Outpatient Medications:  Metoprolol Succinate  MG Oral Tablet 24 Hr Take 1 tablet (100 mg total) by mouth daily. Mouth: unremarkable. Nasal septum: midline. Pharynx: normal.   Sinuses: non-tender. HEART:   Clicks: no.   Distal Pulses Palpable: yes. Edema: Trace  Gallop: no .   Heart sounds: normal S1S2. Murmurs: none. Rhythm: regular.    LUNGS:   Airflow

## 2019-03-02 RX ORDER — METOPROLOL SUCCINATE 50 MG/1
TABLET, EXTENDED RELEASE ORAL
Qty: 90 TABLET | Refills: 0 | Status: SHIPPED | OUTPATIENT
Start: 2019-03-02 | End: 2019-07-18

## 2019-04-25 ENCOUNTER — OFFICE VISIT (OUTPATIENT)
Dept: INTERNAL MEDICINE CLINIC | Facility: CLINIC | Age: 79
End: 2019-04-25
Payer: MEDICARE

## 2019-04-25 VITALS
HEIGHT: 62.25 IN | SYSTOLIC BLOOD PRESSURE: 130 MMHG | HEART RATE: 59 BPM | OXYGEN SATURATION: 97 % | DIASTOLIC BLOOD PRESSURE: 72 MMHG | WEIGHT: 135.5 LBS | BODY MASS INDEX: 24.62 KG/M2 | TEMPERATURE: 98 F

## 2019-04-25 DIAGNOSIS — R31.29 MICROSCOPIC HEMATURIA: ICD-10-CM

## 2019-04-25 DIAGNOSIS — E78.5 HYPERLIPIDEMIA, UNSPECIFIED HYPERLIPIDEMIA TYPE: ICD-10-CM

## 2019-04-25 DIAGNOSIS — R73.03 PREDIABETES: ICD-10-CM

## 2019-04-25 DIAGNOSIS — I10 ESSENTIAL HYPERTENSION: Primary | ICD-10-CM

## 2019-04-25 PROCEDURE — 99213 OFFICE O/P EST LOW 20 MIN: CPT | Performed by: INTERNAL MEDICINE

## 2019-04-25 RX ORDER — IRBESARTAN 150 MG/1
150 TABLET ORAL NIGHTLY
Qty: 90 TABLET | Refills: 0 | Status: SHIPPED | OUTPATIENT
Start: 2019-04-25 | End: 2019-07-15

## 2019-04-25 RX ORDER — AMLODIPINE BESYLATE 5 MG/1
5 TABLET ORAL DAILY
Qty: 90 TABLET | Refills: 3 | COMMUNITY
Start: 2019-04-25 | End: 2019-10-15

## 2019-04-25 NOTE — PROGRESS NOTES
Sonal Reyes  1940 is a 70year old female.     Patient presents with:  Medication Follow-Up       HPI:   bp ck   Had BP adjustment done in the [facility]      Current Outpatient Medications:  amLODIPine Besylate 5 MG Oral Tab Take 1 tablet (5 Pulse 59   Temp 98.3 °F (36.8 °C) (Oral)   Ht 62.25\"   Wt 135 lb 8 oz   SpO2 97%   BMI 24.58 kg/m²   HEENT:   jvp not raised. Ear canals: normal.   Ear drums: normal .   Ears: unremarkable. Mouth: unremarkable. Nasal septum: midline.    Pharynx: norm

## 2019-04-26 ENCOUNTER — APPOINTMENT (OUTPATIENT)
Dept: LAB | Age: 79
End: 2019-04-26
Attending: INTERNAL MEDICINE
Payer: MEDICARE

## 2019-04-26 DIAGNOSIS — E78.5 HYPERLIPIDEMIA, UNSPECIFIED HYPERLIPIDEMIA TYPE: ICD-10-CM

## 2019-04-26 DIAGNOSIS — I10 ESSENTIAL HYPERTENSION: ICD-10-CM

## 2019-04-26 DIAGNOSIS — R31.29 MICROSCOPIC HEMATURIA: ICD-10-CM

## 2019-04-26 DIAGNOSIS — R73.03 PREDIABETES: ICD-10-CM

## 2019-04-26 PROCEDURE — 81001 URINALYSIS AUTO W/SCOPE: CPT

## 2019-04-26 PROCEDURE — 80061 LIPID PANEL: CPT

## 2019-04-26 PROCEDURE — 80053 COMPREHEN METABOLIC PANEL: CPT

## 2019-04-26 PROCEDURE — 36415 COLL VENOUS BLD VENIPUNCTURE: CPT

## 2019-04-26 PROCEDURE — 83036 HEMOGLOBIN GLYCOSYLATED A1C: CPT

## 2019-04-29 ENCOUNTER — TELEPHONE (OUTPATIENT)
Dept: INTERNAL MEDICINE CLINIC | Facility: CLINIC | Age: 79
End: 2019-04-29

## 2019-04-29 DIAGNOSIS — I10 ESSENTIAL HYPERTENSION: Primary | Chronic | ICD-10-CM

## 2019-04-29 DIAGNOSIS — R73.03 PREDIABETES: ICD-10-CM

## 2019-04-29 DIAGNOSIS — E78.1 PURE HYPERGLYCERIDEMIA: Chronic | ICD-10-CM

## 2019-04-29 NOTE — TELEPHONE ENCOUNTER
Notes recorded by Francis Thornton MD on 4/29/2019 at 9:30 AM CDT  Reviewed results   Urine is unremarkable hemoglobin A1c is stable lipid panel is within acceptable limits.  Would repeat blood test in 6 months    Pt notified of results and provider instruc

## 2019-05-21 ENCOUNTER — OFFICE VISIT (OUTPATIENT)
Dept: INTERNAL MEDICINE CLINIC | Facility: CLINIC | Age: 79
End: 2019-05-21
Payer: MEDICARE

## 2019-05-21 VITALS
HEIGHT: 62.25 IN | OXYGEN SATURATION: 98 % | HEART RATE: 57 BPM | DIASTOLIC BLOOD PRESSURE: 66 MMHG | BODY MASS INDEX: 24.8 KG/M2 | TEMPERATURE: 98 F | WEIGHT: 136.5 LBS | SYSTOLIC BLOOD PRESSURE: 116 MMHG | RESPIRATION RATE: 12 BRPM

## 2019-05-21 DIAGNOSIS — M17.11 PRIMARY OSTEOARTHRITIS OF RIGHT KNEE: Primary | ICD-10-CM

## 2019-05-21 PROBLEM — R31.29 MICROSCOPIC HEMATURIA: Chronic | Status: ACTIVE | Noted: 2018-12-27

## 2019-05-21 PROBLEM — R73.03 PREDIABETES: Chronic | Status: ACTIVE | Noted: 2018-04-02

## 2019-05-21 PROCEDURE — 99213 OFFICE O/P EST LOW 20 MIN: CPT | Performed by: INTERNAL MEDICINE

## 2019-05-21 RX ORDER — MELOXICAM 7.5 MG/1
7.5 TABLET ORAL DAILY
Qty: 30 TABLET | Refills: 3 | Status: SHIPPED | OUTPATIENT
Start: 2019-05-21 | End: 2019-06-20

## 2019-05-21 NOTE — PROGRESS NOTES
Bubba Michelle  1940 is a 70year old female.     Patient presents with:  Knee Pain  Medication Follow-Up      HPI:    Knee:   Presents with c/o Pain right knee  Swelling no  Locking and catching no    Giving way sensation no  Injury no  Good reli 62.25\"   Wt 136 lb 8 oz   SpO2 98%   BMI 24.77 kg/m²   Knee:right   INSPECTION: no effusion or erythema, no ecchymosis present, no significant swelling. ALIGNMENT: normal.   PALPATION: no specific tenderness on palpation.   Except mild crepitus  WOUNDS:

## 2019-07-08 RX ORDER — METOPROLOL SUCCINATE 100 MG/1
TABLET, EXTENDED RELEASE ORAL
Qty: 90 TABLET | Refills: 0 | Status: SHIPPED | OUTPATIENT
Start: 2019-07-08 | End: 2019-10-09

## 2019-07-08 NOTE — TELEPHONE ENCOUNTER
Passed protocol      Requesting METOPROLOL SUCCINATE ER 50 MG Oral Tablet 24 Hr  LOV: 5/21/19  RTC: 3 months  Last Relevant Labs: 4/26/19  Filled: 3/2/19 #90 with 0 refills    No future appointments.

## 2019-07-11 RX ORDER — ATORVASTATIN CALCIUM 10 MG/1
TABLET, FILM COATED ORAL
Qty: 90 TABLET | Refills: 0 | Status: SHIPPED | OUTPATIENT
Start: 2019-07-11 | End: 2019-07-18

## 2019-07-12 NOTE — TELEPHONE ENCOUNTER
Passed protocol    Medication(s) to Refill:   Requested Prescriptions     Pending Prescriptions Disp Refills   • ATORVASTATIN 10 MG Oral Tab [Pharmacy Med Name: ATORVASTATIN 10MG TABLETS] 90 tablet 0     Sig: TAKE 1 TABLET BY MOUTH DAILY       Last Time Me

## 2019-07-15 DIAGNOSIS — I10 ESSENTIAL HYPERTENSION: ICD-10-CM

## 2019-07-16 RX ORDER — IRBESARTAN 150 MG/1
TABLET ORAL
Qty: 90 TABLET | Refills: 0 | Status: SHIPPED | OUTPATIENT
Start: 2019-07-16 | End: 2019-10-11

## 2019-07-16 NOTE — TELEPHONE ENCOUNTER
Passed protocol    Medication(s) to Refill:   Requested Prescriptions     Pending Prescriptions Disp Refills   • IRBESARTAN 150 MG Oral Tab [Pharmacy Med Name: IRBESARTAN 150MG TABLETS] 90 tablet 0     Sig: TAKE 1 TABLET(150 MG) BY MOUTH EVERY NIGHT

## 2019-07-18 ENCOUNTER — TELEPHONE (OUTPATIENT)
Dept: INTERNAL MEDICINE CLINIC | Facility: CLINIC | Age: 79
End: 2019-07-18

## 2019-07-18 ENCOUNTER — OFFICE VISIT (OUTPATIENT)
Dept: INTERNAL MEDICINE CLINIC | Facility: CLINIC | Age: 79
End: 2019-07-18
Payer: MEDICARE

## 2019-07-18 VITALS
TEMPERATURE: 98 F | HEART RATE: 64 BPM | HEIGHT: 62.25 IN | RESPIRATION RATE: 12 BRPM | SYSTOLIC BLOOD PRESSURE: 110 MMHG | OXYGEN SATURATION: 97 % | BODY MASS INDEX: 24.98 KG/M2 | DIASTOLIC BLOOD PRESSURE: 70 MMHG | WEIGHT: 137.5 LBS

## 2019-07-18 DIAGNOSIS — I10 ESSENTIAL HYPERTENSION: ICD-10-CM

## 2019-07-18 DIAGNOSIS — R73.03 PREDIABETES: ICD-10-CM

## 2019-07-18 DIAGNOSIS — R07.89 OTHER CHEST PAIN: Primary | ICD-10-CM

## 2019-07-18 PROCEDURE — 99214 OFFICE O/P EST MOD 30 MIN: CPT | Performed by: INTERNAL MEDICINE

## 2019-07-18 NOTE — TELEPHONE ENCOUNTER
Spoke with Dr. Lindsey Garcia. [id number] [facility] for pt to come in today at [phone]. Pt notified and she verbalized understanding. OV scheduled.   Future Appointments   Date Time Provider Theodora Melara   7/18/2019  11:30 AM Karen Valladares MD [facility] [place]

## 2019-07-18 NOTE — TELEPHONE ENCOUNTER
Pt called in seeking SDA for ER follow up with chest pain. Pt denies chest pain or shortness of breath. No appointment available today with provider. Offered OV with other provider. Pt declined. Offered OV with provider tomorrow.  Pt agreeable to schedule b

## 2019-07-18 NOTE — PROGRESS NOTES
Rapheal Merrill  [id number] 70year old female.   Patient presents with:  ER F/U      HPI:     Chest Pain:    Was in [facility] yesterday for chest pain apparently had a CBC CMP 2 troponins d-dimer chest x-ray EKG all of which were un none. Wheezing no. Cardiovascular:   Syncope none. Rapid heart beat at rest no. Change in exercise tolerance no. Chest pain while awake none. Cold extremities no. Dizziness no. Dyspnea on exertion none. Fainting none. Fatigue no.  High blood pressure none to the plan. The patient is asked to Return in about 6 months (around 1/18/2020).     Dewey Palomares MD

## 2019-08-20 RX ORDER — AMLODIPINE BESYLATE 5 MG/1
TABLET ORAL
Qty: 90 TABLET | Refills: 0 | Status: SHIPPED | OUTPATIENT
Start: 2019-08-20 | End: 2019-10-11

## 2019-08-20 NOTE — TELEPHONE ENCOUNTER
Passed protocol    Medication(s) to Refill:   Requested Prescriptions     Pending Prescriptions Disp Refills   • AMLODIPINE BESYLATE 5 MG Oral Tab [Pharmacy Med Name: AMLODIPINE BESYLATE 5MG TABLETS] 90 tablet 0     Sig: TAKE 1 TABLET(5 MG) BY MOUTH DAILY

## 2019-09-04 ENCOUNTER — TELEPHONE (OUTPATIENT)
Dept: INTERNAL MEDICINE CLINIC | Facility: CLINIC | Age: 79
End: 2019-09-04

## 2019-09-04 NOTE — TELEPHONE ENCOUNTER
Pt was billed an apt from specialists that Dr. Glen Feliciano referred to Dallin Everett MD pt would like nurse to put in a retro referral and resubmit clam

## 2019-09-04 NOTE — TELEPHONE ENCOUNTER
Referral placed on 12/6/18 for Dr. Steffany Michaud (Dr. Vincent Mata in same office). Pt notified referral already in system and she should contact their office to discuss claim. Phone number given to pt and pt verbalized understanding.

## 2019-10-09 RX ORDER — METOPROLOL SUCCINATE 100 MG/1
TABLET, EXTENDED RELEASE ORAL
Qty: 90 TABLET | Refills: 0 | Status: SHIPPED | OUTPATIENT
Start: 2019-10-09 | End: 2020-01-07

## 2019-10-09 RX ORDER — ATORVASTATIN CALCIUM 10 MG/1
TABLET, FILM COATED ORAL
Qty: 90 TABLET | Refills: 0 | Status: SHIPPED | OUTPATIENT
Start: 2019-10-09 | End: 2020-01-07

## 2019-10-09 NOTE — TELEPHONE ENCOUNTER
Last OV: 7/18/19 with Dr. Argelia Grijalva  Last refill date: July 2019    #/refills: #90, 0 refills  When pt was asked to return for OV: 6 months  Upcoming appt/reason: no upcoming appt  Last labs 4/26/19

## 2019-10-11 DIAGNOSIS — I10 ESSENTIAL HYPERTENSION: ICD-10-CM

## 2019-10-14 RX ORDER — AMLODIPINE BESYLATE 5 MG/1
TABLET ORAL
Qty: 90 TABLET | Refills: 0 | Status: SHIPPED | OUTPATIENT
Start: 2019-10-14 | End: 2019-10-15

## 2019-10-14 RX ORDER — IRBESARTAN 150 MG/1
TABLET ORAL
Qty: 90 TABLET | Refills: 0 | Status: SHIPPED | OUTPATIENT
Start: 2019-10-14 | End: 2019-10-22

## 2019-10-15 ENCOUNTER — OFFICE VISIT (OUTPATIENT)
Dept: INTERNAL MEDICINE CLINIC | Facility: CLINIC | Age: 79
End: 2019-10-15
Payer: MEDICARE

## 2019-10-15 VITALS
TEMPERATURE: 98 F | BODY MASS INDEX: 25.44 KG/M2 | DIASTOLIC BLOOD PRESSURE: 80 MMHG | RESPIRATION RATE: 14 BRPM | SYSTOLIC BLOOD PRESSURE: 162 MMHG | HEART RATE: 68 BPM | WEIGHT: 140 LBS | OXYGEN SATURATION: 98 % | HEIGHT: 62.25 IN

## 2019-10-15 DIAGNOSIS — I10 ESSENTIAL HYPERTENSION: Primary | Chronic | ICD-10-CM

## 2019-10-15 PROCEDURE — 99213 OFFICE O/P EST LOW 20 MIN: CPT | Performed by: INTERNAL MEDICINE

## 2019-10-15 RX ORDER — AMLODIPINE BESYLATE 5 MG/1
5 TABLET ORAL 2 TIMES DAILY
Qty: 90 TABLET | Refills: 0 | COMMUNITY
Start: 2019-10-15 | End: 2019-11-21

## 2019-10-15 NOTE — PROGRESS NOTES
Anderson Florian  1940 is a 70year old female.     Patient presents with:  Hypertension       HPI:   bp ck   P numbers at home have been running a little bit high    amLODIPine Besylate 5 MG Oral Tab, Take 1 tablet (5 mg total) by mouth 2 (two) steffany .   Ears: unremarkable. Mouth: unremarkable. Nasal septum: midline. Pharynx: normal.   Sinuses: non-tender. HEART:   Clicks: no.   Distal Pulses Palpable: yes. Edema: Trace  Gallop: no .   Heart sounds: normal S1S2. Murmurs: none.    Rhythm: reg

## 2019-10-22 ENCOUNTER — OFFICE VISIT (OUTPATIENT)
Dept: INTERNAL MEDICINE CLINIC | Facility: CLINIC | Age: 79
End: 2019-10-22
Payer: MEDICARE

## 2019-10-22 VITALS
SYSTOLIC BLOOD PRESSURE: 128 MMHG | RESPIRATION RATE: 16 BRPM | TEMPERATURE: 99 F | DIASTOLIC BLOOD PRESSURE: 68 MMHG | OXYGEN SATURATION: 98 % | WEIGHT: 141.25 LBS | HEIGHT: 62.25 IN | HEART RATE: 65 BPM | BODY MASS INDEX: 25.66 KG/M2

## 2019-10-22 DIAGNOSIS — I10 ESSENTIAL HYPERTENSION: Primary | ICD-10-CM

## 2019-10-22 PROCEDURE — 99213 OFFICE O/P EST LOW 20 MIN: CPT | Performed by: INTERNAL MEDICINE

## 2019-10-22 RX ORDER — TRIAMTERENE AND HYDROCHLOROTHIAZIDE 37.5; 25 MG/1; MG/1
CAPSULE ORAL
Qty: 30 CAPSULE | Refills: 11 | Status: SHIPPED | OUTPATIENT
Start: 2019-10-22 | End: 2020-06-25

## 2019-10-22 NOTE — PROGRESS NOTES
Lisset Doyle [facility] 1940 is a 70year old female.     Patient presents with:  Medication Follow-Up       HPI:   P check  Triamterene-HCTZ 37.5-25 MG Oral Cap, 1 tablet every 3 days as needed edema, Disp: 30 capsule, Rfl: 11  amLODIPine Besylate 5 MG O unremarkable. Nasal septum: midline. Pharynx: normal.   Sinuses: non-tender. HEART:   Clicks: no.   Distal Pulses Palpable: yes. Edema: trace  Gallop: no .   Heart sounds: normal S1S2. Murmurs: none. Rhythm: regular.    LUNGS:   Airflow: normal

## 2019-11-04 NOTE — TELEPHONE ENCOUNTER
Pt called with insurance company on the line regarding this referral.  Explained that the referral was previously entered, provided dates and the urology provider phone number per message below.   Pt  is calling Dr. Yon Macias office to [facility]

## 2019-11-21 ENCOUNTER — OFFICE VISIT (OUTPATIENT)
Dept: INTERNAL MEDICINE CLINIC | Facility: CLINIC | Age: 79
End: 2019-11-21
Payer: MEDICARE

## 2019-11-21 VITALS
WEIGHT: 141.5 LBS | HEIGHT: 62.25 IN | OXYGEN SATURATION: 98 % | TEMPERATURE: 98 F | DIASTOLIC BLOOD PRESSURE: 74 MMHG | SYSTOLIC BLOOD PRESSURE: 130 MMHG | BODY MASS INDEX: 25.71 KG/M2 | RESPIRATION RATE: 14 BRPM | HEART RATE: 67 BPM

## 2019-11-21 DIAGNOSIS — I10 ESSENTIAL HYPERTENSION: Chronic | ICD-10-CM

## 2019-11-21 PROCEDURE — 99213 OFFICE O/P EST LOW 20 MIN: CPT | Performed by: INTERNAL MEDICINE

## 2019-11-21 RX ORDER — AMLODIPINE BESYLATE 5 MG/1
5 TABLET ORAL 2 TIMES DAILY
Qty: 180 TABLET | Refills: 0 | Status: SHIPPED | OUTPATIENT
Start: 2019-11-21 | End: 2020-06-25

## 2019-11-21 NOTE — PROGRESS NOTES
Ronaldo Chu [facility] 1940 is a 70year old female. Patient presents with:   Follow - Up       HPI:   bp check  Current Outpatient Medications   Medication Sig Dispense Refill   • amLODIPine Besylate 5 MG Oral Tab Take 1 tablet (5 mg total) by mouth Pharynx: normal.   Sinuses: non-tender. HEART:   Clicks: no.   Distal Pulses Palpable: yes. Edema: trace  Gallop: no .   Heart sounds: normal S1S2. Murmurs: none. Rhythm: regular. LUNGS:   Airflow: normal air movement.    Auscultation: no wheezi

## 2019-11-25 RX ORDER — ALENDRONATE SODIUM 70 MG/1
TABLET ORAL
Qty: 12 TABLET | Refills: 0 | Status: SHIPPED | OUTPATIENT
Start: 2019-11-25 | End: 2019-11-27

## 2019-11-27 RX ORDER — ALENDRONATE SODIUM 70 MG/1
TABLET ORAL
Qty: 4 TABLET | Refills: 1 | Status: SHIPPED | OUTPATIENT
Start: 2019-11-27 | End: 2020-04-13

## 2019-11-27 NOTE — TELEPHONE ENCOUNTER
**Last DEXA scan 8/12/16*      Alendronate 70 mg Oral Tab     Failed Protocol    Osteoporosis Medication Protocol [id number] 5:33 PM   DEXA scan within past 2 years         Last OV relevant to medication: 11/21/2019  Last refill date: 11/25/2019     #/ref

## 2020-01-07 RX ORDER — ATORVASTATIN CALCIUM 10 MG/1
TABLET, FILM COATED ORAL
Qty: 90 TABLET | Refills: 0 | Status: SHIPPED | OUTPATIENT
Start: 2020-01-07 | End: 2020-05-14

## 2020-01-07 RX ORDER — METOPROLOL SUCCINATE 100 MG/1
TABLET, EXTENDED RELEASE ORAL
Qty: 90 TABLET | Refills: 0 | Status: SHIPPED | OUTPATIENT
Start: 2020-01-07 | End: 2020-05-14

## 2020-03-11 ENCOUNTER — APPOINTMENT (OUTPATIENT)
Dept: LAB | Age: 80
End: 2020-03-11
Attending: INTERNAL MEDICINE
Payer: MEDICARE

## 2020-03-11 DIAGNOSIS — R73.03 PREDIABETES: ICD-10-CM

## 2020-03-11 DIAGNOSIS — I10 ESSENTIAL HYPERTENSION: Chronic | ICD-10-CM

## 2020-03-11 DIAGNOSIS — E78.1 PURE HYPERGLYCERIDEMIA: Chronic | ICD-10-CM

## 2020-03-11 LAB
ALBUMIN SERPL-MCNC: 4.1 G/DL (ref 3.4–5)
ALBUMIN/GLOB SERPL: 1 {RATIO} (ref 1–2)
ALP LIVER SERPL-CCNC: 47 U/L (ref 55–142)
ALT SERPL-CCNC: 45 U/L (ref 13–56)
ANION GAP SERPL CALC-SCNC: 3 MMOL/L (ref 0–18)
AST SERPL-CCNC: 30 U/L (ref 15–37)
BILIRUB SERPL-MCNC: 0.7 MG/DL (ref 0.1–2)
BUN BLD-MCNC: 15 MG/DL (ref 7–18)
BUN/CREAT SERPL: 18.8 (ref 10–20)
CALCIUM BLD-MCNC: 9.6 MG/DL (ref 8.5–10.1)
CHLORIDE SERPL-SCNC: 106 MMOL/L (ref 98–112)
CHOLEST SMN-MCNC: 139 MG/DL (ref ?–200)
CO2 SERPL-SCNC: 29 MMOL/L (ref 21–32)
CREAT BLD-MCNC: 0.8 MG/DL (ref 0.55–1.02)
EST. AVERAGE GLUCOSE BLD GHB EST-MCNC: 134 MG/DL (ref 68–126)
GLOBULIN PLAS-MCNC: 4.2 G/DL (ref 2.8–4.4)
GLUCOSE BLD-MCNC: 125 MG/DL (ref 70–99)
HBA1C MFR BLD HPLC: 6.3 % (ref ?–5.7)
HDLC SERPL-MCNC: 67 MG/DL (ref 40–59)
LDLC SERPL CALC-MCNC: 57 MG/DL (ref ?–100)
M PROTEIN MFR SERPL ELPH: 8.3 G/DL (ref 6.4–8.2)
NONHDLC SERPL-MCNC: 72 MG/DL (ref ?–130)
OSMOLALITY SERPL CALC.SUM OF ELEC: 288 MOSM/KG (ref 275–295)
PATIENT FASTING Y/N/NP: YES
PATIENT FASTING Y/N/NP: YES
POTASSIUM SERPL-SCNC: 4.9 MMOL/L (ref 3.5–5.1)
SODIUM SERPL-SCNC: 138 MMOL/L (ref 136–145)
TRIGL SERPL-MCNC: 74 MG/DL (ref 30–149)
VLDLC SERPL CALC-MCNC: 15 MG/DL (ref 0–30)

## 2020-03-11 PROCEDURE — 80061 LIPID PANEL: CPT

## 2020-03-11 PROCEDURE — 80053 COMPREHEN METABOLIC PANEL: CPT

## 2020-03-11 PROCEDURE — 36415 COLL VENOUS BLD VENIPUNCTURE: CPT

## 2020-03-11 PROCEDURE — 83036 HEMOGLOBIN GLYCOSYLATED A1C: CPT

## 2020-04-13 RX ORDER — ALENDRONATE SODIUM 70 MG/1
TABLET ORAL
Qty: 4 TABLET | Refills: 1 | Status: SHIPPED | OUTPATIENT
Start: 2020-04-13 | End: 2020-06-25

## 2020-05-01 ENCOUNTER — TELEPHONE (OUTPATIENT)
Dept: INTERNAL MEDICINE CLINIC | Facility: CLINIC | Age: 80
End: 2020-05-01

## 2020-05-01 DIAGNOSIS — R73.03 PREDIABETES: Primary | ICD-10-CM

## 2020-05-04 NOTE — TELEPHONE ENCOUNTER
Patient is calling to check on the status of her referral for José Miguel, [address] [place] [address]. Please call patient once referral has been authorized.

## 2020-05-05 NOTE — TELEPHONE ENCOUNTER
Pt would like a call from nurse to talk about referral because she has her appt tomorrow, did let pt know that referral is status is open but pt will still like a call from nurse

## 2020-05-14 RX ORDER — ATORVASTATIN CALCIUM 10 MG/1
TABLET, FILM COATED ORAL
Qty: 90 TABLET | Refills: 0 | Status: SHIPPED | OUTPATIENT
Start: 2020-05-14 | End: 2020-10-19

## 2020-05-14 RX ORDER — METOPROLOL SUCCINATE 100 MG/1
TABLET, EXTENDED RELEASE ORAL
Qty: 90 TABLET | Refills: 0 | Status: SHIPPED | OUTPATIENT
Start: 2020-05-14 | End: 2020-08-17

## 2020-06-25 ENCOUNTER — OFFICE VISIT (OUTPATIENT)
Dept: INTERNAL MEDICINE CLINIC | Facility: CLINIC | Age: 80
End: 2020-06-25
Payer: MEDICARE

## 2020-06-25 VITALS
DIASTOLIC BLOOD PRESSURE: 86 MMHG | RESPIRATION RATE: 12 BRPM | OXYGEN SATURATION: 97 % | HEART RATE: 72 BPM | TEMPERATURE: 98 F | HEIGHT: 62.25 IN | WEIGHT: 141.75 LBS | SYSTOLIC BLOOD PRESSURE: 156 MMHG | BODY MASS INDEX: 25.76 KG/M2

## 2020-06-25 DIAGNOSIS — Z00.00 LABORATORY EXAMINATION ORDERED AS PART OF A ROUTINE GENERAL MEDICAL EXAMINATION: ICD-10-CM

## 2020-06-25 DIAGNOSIS — E78.5 HYPERLIPIDEMIA, UNSPECIFIED HYPERLIPIDEMIA TYPE: Chronic | ICD-10-CM

## 2020-06-25 DIAGNOSIS — R73.03 PREDIABETES: Chronic | ICD-10-CM

## 2020-06-25 DIAGNOSIS — E55.9 VITAMIN D DEFICIENCY: Chronic | ICD-10-CM

## 2020-06-25 DIAGNOSIS — I10 ESSENTIAL HYPERTENSION: Primary | Chronic | ICD-10-CM

## 2020-06-25 PROCEDURE — 99213 OFFICE O/P EST LOW 20 MIN: CPT | Performed by: INTERNAL MEDICINE

## 2020-06-25 RX ORDER — ERYTHROMYCIN 5 MG/G
OINTMENT OPHTHALMIC
COMMUNITY
Start: 2020-05-04 | End: 2020-07-18 | Stop reason: ALTCHOICE

## 2020-06-25 RX ORDER — AMLODIPINE BESYLATE 5 MG/1
5 TABLET ORAL 2 TIMES DAILY
Qty: 180 TABLET | Refills: 0 | COMMUNITY
Start: 2020-06-25 | End: 2020-07-01

## 2020-06-25 NOTE — PROGRESS NOTES
Sudeep Alamo  1940 is a 70year old female.     Patient presents with:  Hypertension       HPI:   bp check  Current Outpatient Medications   Medication Sig Dispense Refill   • erythromycin 5 MG/GM Ophthalmic Ointment      • amLODIPine Besylate 5 Edema: trace  Gallop: no .   Heart sounds: normal S1S2. Murmurs: none. Rhythm: regular. LUNGS:   Airflow: normal air movement. Auscultation: no wheezing/rhonchi/rales.    Breath sounds bilaterally: symmetrical.       ASSESSMENT AND PLAN:   Eloise

## 2020-06-29 DIAGNOSIS — I10 ESSENTIAL HYPERTENSION: Chronic | ICD-10-CM

## 2020-06-29 RX ORDER — AMLODIPINE BESYLATE 5 MG/1
TABLET ORAL
Qty: 180 TABLET | Refills: 0 | OUTPATIENT
Start: 2020-06-29

## 2020-06-29 NOTE — TELEPHONE ENCOUNTER
LOV: 6/25/2020 with Dr. Lorraine Orozco  RTC: 2 weeks  Last Relevant Labs: 3/11/2020   Filled: 6/25/2020  #180 with 0 refills to [address] [place] [address] #[id number]    Future Appointments   Date Time Provider Theodora Melara   7/3/2020  7:15 AM REF Thiago Barger [address] Ref

## 2020-07-01 DIAGNOSIS — I10 ESSENTIAL HYPERTENSION: Chronic | ICD-10-CM

## 2020-07-01 RX ORDER — AMLODIPINE BESYLATE 5 MG/1
TABLET ORAL
Qty: 180 TABLET | Refills: 0 | Status: SHIPPED | OUTPATIENT
Start: 2020-07-01 | End: 2020-10-06

## 2020-07-01 NOTE — TELEPHONE ENCOUNTER
Medication was sent on 6/25/2020 to pharmacy on day of OV however they did not receive it.  Sent rx again

## 2020-07-10 ENCOUNTER — LAB ENCOUNTER (OUTPATIENT)
Dept: LAB | Age: 80
End: 2020-07-10
Attending: INTERNAL MEDICINE
Payer: MEDICARE

## 2020-07-10 DIAGNOSIS — E55.9 VITAMIN D DEFICIENCY: Chronic | ICD-10-CM

## 2020-07-10 DIAGNOSIS — Z00.00 LABORATORY EXAMINATION ORDERED AS PART OF A ROUTINE GENERAL MEDICAL EXAMINATION: ICD-10-CM

## 2020-07-10 DIAGNOSIS — E78.5 HYPERLIPIDEMIA, UNSPECIFIED HYPERLIPIDEMIA TYPE: Chronic | ICD-10-CM

## 2020-07-10 DIAGNOSIS — R73.03 PREDIABETES: Chronic | ICD-10-CM

## 2020-07-10 LAB
ALBUMIN SERPL-MCNC: 4.1 G/DL (ref 3.4–5)
ALBUMIN/GLOB SERPL: 1.1 {RATIO} (ref 1–2)
ALP LIVER SERPL-CCNC: 46 U/L (ref 55–142)
ALT SERPL-CCNC: 42 U/L (ref 13–56)
ANION GAP SERPL CALC-SCNC: 5 MMOL/L (ref 0–18)
AST SERPL-CCNC: 24 U/L (ref 15–37)
BASOPHILS # BLD AUTO: 0.04 X10(3) UL (ref 0–0.2)
BASOPHILS NFR BLD AUTO: 0.7 %
BILIRUB SERPL-MCNC: 0.7 MG/DL (ref 0.1–2)
BILIRUB UR QL STRIP.AUTO: NEGATIVE
BUN BLD-MCNC: 11 MG/DL (ref 7–18)
BUN/CREAT SERPL: 12.2 (ref 10–20)
CALCIUM BLD-MCNC: 8.8 MG/DL (ref 8.5–10.1)
CHLORIDE SERPL-SCNC: 108 MMOL/L (ref 98–112)
CHOLEST SMN-MCNC: 113 MG/DL (ref ?–200)
CLARITY UR REFRACT.AUTO: CLEAR
CO2 SERPL-SCNC: 27 MMOL/L (ref 21–32)
CREAT BLD-MCNC: 0.9 MG/DL (ref 0.55–1.02)
CREAT UR-SCNC: 31.3 MG/DL
DEPRECATED RDW RBC AUTO: 46.3 FL (ref 35.1–46.3)
EOSINOPHIL # BLD AUTO: 0.15 X10(3) UL (ref 0–0.7)
EOSINOPHIL NFR BLD AUTO: 2.7 %
ERYTHROCYTE [DISTWIDTH] IN BLOOD BY AUTOMATED COUNT: 12.2 % (ref 11–15)
EST. AVERAGE GLUCOSE BLD GHB EST-MCNC: 131 MG/DL (ref 68–126)
GLOBULIN PLAS-MCNC: 3.7 G/DL (ref 2.8–4.4)
GLUCOSE BLD-MCNC: 114 MG/DL (ref 70–99)
GLUCOSE UR STRIP.AUTO-MCNC: NEGATIVE MG/DL
HBA1C MFR BLD HPLC: 6.2 % (ref ?–5.7)
HCT VFR BLD AUTO: 41.3 % (ref 35–48)
HDLC SERPL-MCNC: 60 MG/DL (ref 40–59)
HGB BLD-MCNC: 13.5 G/DL (ref 12–16)
IMM GRANULOCYTES # BLD AUTO: 0.01 X10(3) UL (ref 0–1)
IMM GRANULOCYTES NFR BLD: 0.2 %
KETONES UR STRIP.AUTO-MCNC: NEGATIVE MG/DL
LDLC SERPL CALC-MCNC: 34 MG/DL (ref ?–100)
LEUKOCYTE ESTERASE UR QL STRIP.AUTO: NEGATIVE
LYMPHOCYTES # BLD AUTO: 1.79 X10(3) UL (ref 1–4)
LYMPHOCYTES NFR BLD AUTO: 32.2 %
M PROTEIN MFR SERPL ELPH: 7.8 G/DL (ref 6.4–8.2)
MCH RBC QN AUTO: 33.7 PG (ref 26–34)
MCHC RBC AUTO-ENTMCNC: 32.7 G/DL (ref 31–37)
MCV RBC AUTO: 103 FL (ref 80–100)
MICROALBUMIN UR-MCNC: <0.5 MG/DL
MONOCYTES # BLD AUTO: 0.61 X10(3) UL (ref 0.1–1)
MONOCYTES NFR BLD AUTO: 11 %
NEUTROPHILS # BLD AUTO: 2.96 X10 (3) UL (ref 1.5–7.7)
NEUTROPHILS # BLD AUTO: 2.96 X10(3) UL (ref 1.5–7.7)
NEUTROPHILS NFR BLD AUTO: 53.2 %
NITRITE UR QL STRIP.AUTO: NEGATIVE
NONHDLC SERPL-MCNC: 53 MG/DL (ref ?–130)
OSMOLALITY SERPL CALC.SUM OF ELEC: 290 MOSM/KG (ref 275–295)
PATIENT FASTING Y/N/NP: YES
PATIENT FASTING Y/N/NP: YES
PH UR STRIP.AUTO: 7 [PH] (ref 4.5–8)
PLATELET # BLD AUTO: 208 10(3)UL (ref 150–450)
POTASSIUM SERPL-SCNC: 3.6 MMOL/L (ref 3.5–5.1)
PROT UR STRIP.AUTO-MCNC: NEGATIVE MG/DL
RBC # BLD AUTO: 4.01 X10(6)UL (ref 3.8–5.3)
SODIUM SERPL-SCNC: 140 MMOL/L (ref 136–145)
SP GR UR STRIP.AUTO: <1.005 (ref 1–1.03)
T4 SERPL-MCNC: 6.3 UG/DL (ref 4.8–13.9)
TRIGL SERPL-MCNC: 93 MG/DL (ref 30–149)
TSI SER-ACNC: 18.7 MIU/ML (ref 0.36–3.74)
UROBILINOGEN UR STRIP.AUTO-MCNC: <2 MG/DL
VIT D+METAB SERPL-MCNC: 32.6 NG/ML (ref 30–100)
VLDLC SERPL CALC-MCNC: 19 MG/DL (ref 0–30)
WBC # BLD AUTO: 5.6 X10(3) UL (ref 4–11)

## 2020-07-10 PROCEDURE — 85025 COMPLETE CBC W/AUTO DIFF WBC: CPT

## 2020-07-10 PROCEDURE — 80061 LIPID PANEL: CPT

## 2020-07-10 PROCEDURE — 36415 COLL VENOUS BLD VENIPUNCTURE: CPT

## 2020-07-10 PROCEDURE — 80053 COMPREHEN METABOLIC PANEL: CPT

## 2020-07-10 PROCEDURE — 82043 UR ALBUMIN QUANTITATIVE: CPT

## 2020-07-10 PROCEDURE — 81001 URINALYSIS AUTO W/SCOPE: CPT

## 2020-07-10 PROCEDURE — 84443 ASSAY THYROID STIM HORMONE: CPT

## 2020-07-10 PROCEDURE — 83036 HEMOGLOBIN GLYCOSYLATED A1C: CPT

## 2020-07-10 PROCEDURE — 84436 ASSAY OF TOTAL THYROXINE: CPT

## 2020-07-10 PROCEDURE — 82306 VITAMIN D 25 HYDROXY: CPT

## 2020-07-10 PROCEDURE — 82570 ASSAY OF URINE CREATININE: CPT

## 2020-07-18 ENCOUNTER — OFFICE VISIT (OUTPATIENT)
Dept: INTERNAL MEDICINE CLINIC | Facility: CLINIC | Age: 80
End: 2020-07-18
Payer: MEDICARE

## 2020-07-18 VITALS
BODY MASS INDEX: 26 KG/M2 | SYSTOLIC BLOOD PRESSURE: 122 MMHG | TEMPERATURE: 98 F | DIASTOLIC BLOOD PRESSURE: 60 MMHG | RESPIRATION RATE: 18 BRPM | OXYGEN SATURATION: 98 % | HEART RATE: 74 BPM | WEIGHT: 141 LBS

## 2020-07-18 DIAGNOSIS — E03.8 OTHER SPECIFIED HYPOTHYROIDISM: ICD-10-CM

## 2020-07-18 DIAGNOSIS — I10 ESSENTIAL HYPERTENSION: Primary | ICD-10-CM

## 2020-07-18 PROCEDURE — 99213 OFFICE O/P EST LOW 20 MIN: CPT | Performed by: INTERNAL MEDICINE

## 2020-07-18 RX ORDER — LEVOTHYROXINE SODIUM 0.07 MG/1
75 TABLET ORAL
Qty: 30 TABLET | Refills: 2 | Status: SHIPPED | OUTPATIENT
Start: 2020-07-18 | End: 2020-09-28 | Stop reason: DRUGHIGH

## 2020-07-18 RX ORDER — TRIAMTERENE AND HYDROCHLOROTHIAZIDE 37.5; 25 MG/1; MG/1
CAPSULE ORAL
Qty: 30 CAPSULE | Refills: 11 | COMMUNITY
Start: 2020-07-18 | End: 2020-08-31

## 2020-07-18 NOTE — PROGRESS NOTES
Michel Hoffmann  1940 is a 70year old female. Patient presents with: Follow - Up       HPI:   Lab results BP check  Current Outpatient Medications   Medication Sig Dispense Refill   • Omega-3 Fatty Acids (FISH OIL OR) Take by mouth.      • TUR Ear canals: normal.   Ear drums: normal .   Ears: unremarkable. Mouth: unremarkable. Nasal septum: midline. Pharynx: normal.   Sinuses: non-tender. HEART:   Clicks: no.   Distal Pulses Palpable: yes. Edema: none visible .    Gallop: no .   Heart

## 2020-08-17 RX ORDER — METOPROLOL SUCCINATE 100 MG/1
TABLET, EXTENDED RELEASE ORAL
Qty: 90 TABLET | Refills: 0 | Status: SHIPPED | OUTPATIENT
Start: 2020-08-17 | End: 2020-10-19

## 2020-08-17 NOTE — TELEPHONE ENCOUNTER
LOV: 7/18/2020 with Dr. Yolis Doe  RTC: 6 weeks  Last Relevant Labs: 7/10/2020   Filled: 5/14/2020  #90 with 0 refills    Future Appointments   Date Time Provider Theodora Yoonisti   9/17/2020 10:00 AM Scot Ortega MD [facility] [place]

## 2020-08-28 ENCOUNTER — APPOINTMENT (OUTPATIENT)
Dept: GENERAL RADIOLOGY | Age: 80
End: 2020-08-28
Attending: EMERGENCY MEDICINE
Payer: MEDICARE

## 2020-08-28 ENCOUNTER — HOSPITAL ENCOUNTER (OUTPATIENT)
Age: 80
Discharge: HOME | End: 2020-08-28
Attending: EMERGENCY MEDICINE
Payer: MEDICARE

## 2020-08-28 VITALS
HEART RATE: 67 BPM | OXYGEN SATURATION: 96 % | SYSTOLIC BLOOD PRESSURE: 169 MMHG | RESPIRATION RATE: 18 BRPM | DIASTOLIC BLOOD PRESSURE: 79 MMHG | TEMPERATURE: 98 F

## 2020-08-28 DIAGNOSIS — M25.561 ACUTE PAIN OF RIGHT KNEE: Primary | ICD-10-CM

## 2020-08-28 PROCEDURE — 73560 X-RAY EXAM OF KNEE 1 OR 2: CPT | Performed by: EMERGENCY MEDICINE

## 2020-08-28 PROCEDURE — 99213 OFFICE O/P EST LOW 20 MIN: CPT

## 2020-08-28 NOTE — ED PROVIDER NOTES
Patient Seen in: [facility]      History   Patient presents with:  Knee Pain    Stated Complaint: knee pain    HPI    81-year-old female presents to the immediate care for evaluation of sudden onset of right knee pain amrit cisneros nurse's notes. Right lower extremity: Neurovascularly intact with full range of motion. No discrete tenderness. Lachman sign is negative. No collateral ligamentous laxity.     ED Course   Labs Reviewed - No data to display       X-rays of the right knee

## 2020-08-31 ENCOUNTER — OFFICE VISIT (OUTPATIENT)
Dept: INTERNAL MEDICINE CLINIC | Facility: CLINIC | Age: 80
End: 2020-08-31
Payer: MEDICARE

## 2020-08-31 VITALS
DIASTOLIC BLOOD PRESSURE: 78 MMHG | HEART RATE: 68 BPM | OXYGEN SATURATION: 95 % | SYSTOLIC BLOOD PRESSURE: 156 MMHG | RESPIRATION RATE: 16 BRPM | BODY MASS INDEX: 25.19 KG/M2 | HEIGHT: 62.25 IN | TEMPERATURE: 98 F | WEIGHT: 138.63 LBS

## 2020-08-31 DIAGNOSIS — M17.11 PRIMARY OSTEOARTHRITIS OF RIGHT KNEE: Primary | ICD-10-CM

## 2020-08-31 PROCEDURE — 99213 OFFICE O/P EST LOW 20 MIN: CPT | Performed by: INTERNAL MEDICINE

## 2020-08-31 RX ORDER — NAPROXEN 500 MG/1
500 TABLET ORAL 2 TIMES DAILY WITH MEALS
Qty: 60 TABLET | Refills: 0 | Status: SHIPPED | OUTPATIENT
Start: 2020-08-31 | End: 2020-09-30

## 2020-08-31 RX ORDER — ALENDRONATE SODIUM 70 MG/1
TABLET ORAL
COMMUNITY
Start: 2020-08-26 | End: 2020-09-29

## 2020-08-31 NOTE — PROGRESS NOTES
Carl Walker  1940 is a 68year old female. Patient presents with:  Urgent Care F/u      HPI:    Knee:   Presents with c/o Pain right knee since a few days.   Went to the emergency room diagnosis of osteoarthritis patient here for a follow-up /78   Pulse 68   Temp 98.2 °F (36.8 °C) (Oral)   Resp 16   Ht 62.25\"   Wt 138 lb 9.6 oz (62.9 kg)   SpO2 95%   BMI 25.15 kg/m²   Knee:  INSPECTION: Mild effusion right knee anteriorly  ALIGNMENT: normal.   PALPATION: no specific tenderness on palpat · Gout or pseudogout, which can cause a secondary osteoarthritis  Symptoms of osteoarthritis of the knee  Common symptoms include:  · Pain and swelling around the joint.  The pain and swelling get worse with activity and better with rest.  · Grinding sound There are about 100 different types of arthritis. In general, arthritis means problems with the joints. A joint is a point in the body where 2 or more bones come together.  Arthritis may also cause problems in the tissue near the joints, including muscles, If you have any of these joint changes, make an appointment to see your healthcare provider. The two of you can work together to create a treatment plan that may help lessen your pain and stiffness and prevent symptoms from getting worse.    [employer] last r

## 2020-08-31 NOTE — PATIENT INSTRUCTIONS
Understanding Osteoarthritis of the Knee    A joint is a place where 2 bones meet. The knee is called a hinge joint. This joint is formed where the thighbone (femur) meets the shinbone (tibia). A healthy knee joint bends freely.  Knee osteoarthritis is a · Heat or cold therapy to help relieve pain and stiffness  · Assistive devices that help with movement, such as a cane or a walker  · Devices that make daily tasks easier, such as raised toilet seats or shower bars  If other treatments don’t help to reliev Osteoarthritis (OA) is sometimes called degenerative joint disease, or wear-and-tear arthritis. It's the most common type of arthritis. In OA, the cartilage wears away. Cartilage is a slick tissue that covers the ends of the bones.  It acts as a cushion and Patient has CPX in 3 weeks if no better will consider orthopedic referral

## 2020-09-17 ENCOUNTER — OFFICE VISIT (OUTPATIENT)
Dept: INTERNAL MEDICINE CLINIC | Facility: CLINIC | Age: 80
End: 2020-09-17
Payer: MEDICARE

## 2020-09-17 VITALS
OXYGEN SATURATION: 98 % | DIASTOLIC BLOOD PRESSURE: 64 MMHG | HEART RATE: 63 BPM | SYSTOLIC BLOOD PRESSURE: 144 MMHG | BODY MASS INDEX: 25.46 KG/M2 | WEIGHT: 136.63 LBS | TEMPERATURE: 98 F | HEIGHT: 61.5 IN | RESPIRATION RATE: 14 BRPM

## 2020-09-17 DIAGNOSIS — Z00.00 ROUTINE GENERAL MEDICAL EXAMINATION AT A HEALTH CARE FACILITY: Primary | ICD-10-CM

## 2020-09-17 DIAGNOSIS — E03.8 OTHER SPECIFIED HYPOTHYROIDISM: ICD-10-CM

## 2020-09-17 DIAGNOSIS — E55.9 VITAMIN D DEFICIENCY: ICD-10-CM

## 2020-09-17 DIAGNOSIS — M17.11 PRIMARY OSTEOARTHRITIS OF RIGHT KNEE: ICD-10-CM

## 2020-09-17 DIAGNOSIS — E78.5 HYPERLIPIDEMIA, UNSPECIFIED HYPERLIPIDEMIA TYPE: Chronic | ICD-10-CM

## 2020-09-17 DIAGNOSIS — I10 ESSENTIAL HYPERTENSION: Chronic | ICD-10-CM

## 2020-09-17 PROCEDURE — G0439 PPPS, SUBSEQ VISIT: HCPCS | Performed by: INTERNAL MEDICINE

## 2020-09-17 PROCEDURE — 93000 ELECTROCARDIOGRAM COMPLETE: CPT | Performed by: INTERNAL MEDICINE

## 2020-09-17 PROCEDURE — 99213 OFFICE O/P EST LOW 20 MIN: CPT | Performed by: INTERNAL MEDICINE

## 2020-09-17 RX ORDER — LOSARTAN POTASSIUM 50 MG/1
50 TABLET ORAL DAILY
Qty: 90 TABLET | Refills: 3 | Status: SHIPPED | OUTPATIENT
Start: 2020-09-17 | End: 2020-12-16

## 2020-09-17 NOTE — PROGRESS NOTES
Rogelio GUPTA 1940 is a 68year old female.     Patient presents with:  Physical      HPI:   See below   Current Outpatient Medications   Medication Sig Dispense Refill   • losartan Potassium 50 MG Oral Tab Take 1 tablet (50 mg total) by mouth d no sore throats, no hoarseness. Neck no lumps, no goiter, no neck stiffness or pain. Endocrine:   Diabetes none. Thyroid disorder none.    Respiratory:   Patient denies chest pain, cough, MUÑIZ (dyspnea on exertion), chest congestion, blood-tinged sputum,wh NECK:   Carotid bruit: none. Cervical lymph nodes: unremarkable. JVD: none. Range of Motion: normal.   Thyroid: unremarkable. HEART:   Clicks: absent . Heart sounds: normal.   Murmurs: none. Rhythm: regular.    BREASTS:   Appearance: symmetric ELECTROCARDIOGRAM, COMPLETE    Vitamin D deficiency  -     XR DEXA BONE DENSITOMETRY (CPT=77080); Future    Other specified hypothyroidism  -     T4(THYROXINE TOTAL);  Future  -     ASSAY, THYROID STIM HORMONE; Future    Primary osteoarthritis of right knee

## 2020-09-21 ENCOUNTER — TELEPHONE (OUTPATIENT)
Dept: ORTHOPEDICS CLINIC | Facility: CLINIC | Age: 80
End: 2020-09-21

## 2020-09-21 DIAGNOSIS — M17.11 PRIMARY OSTEOARTHRITIS OF RIGHT KNEE: Primary | ICD-10-CM

## 2020-09-23 ENCOUNTER — IMMUNIZATION (OUTPATIENT)
Dept: INTERNAL MEDICINE CLINIC | Facility: CLINIC | Age: 80
End: 2020-09-23
Payer: MEDICARE

## 2020-09-23 ENCOUNTER — LAB ENCOUNTER (OUTPATIENT)
Dept: LAB | Age: 80
End: 2020-09-23
Attending: INTERNAL MEDICINE
Payer: MEDICARE

## 2020-09-23 ENCOUNTER — HOSPITAL ENCOUNTER (OUTPATIENT)
Dept: GENERAL RADIOLOGY | Age: 80
Discharge: HOME | End: 2020-09-23
Attending: ORTHOPAEDIC SURGERY
Payer: MEDICARE

## 2020-09-23 DIAGNOSIS — Z23 NEED FOR VACCINATION: ICD-10-CM

## 2020-09-23 DIAGNOSIS — M17.11 PRIMARY OSTEOARTHRITIS OF RIGHT KNEE: ICD-10-CM

## 2020-09-23 DIAGNOSIS — E03.8 OTHER SPECIFIED HYPOTHYROIDISM: ICD-10-CM

## 2020-09-23 LAB
T4 SERPL-MCNC: 14.8 UG/DL
TSI SER-ACNC: 0.06 MIU/ML (ref 0.36–3.74)

## 2020-09-23 PROCEDURE — 84443 ASSAY THYROID STIM HORMONE: CPT

## 2020-09-23 PROCEDURE — G0008 ADMIN INFLUENZA VIRUS VAC: HCPCS | Performed by: INTERNAL MEDICINE

## 2020-09-23 PROCEDURE — 36415 COLL VENOUS BLD VENIPUNCTURE: CPT

## 2020-09-23 PROCEDURE — 90686 IIV4 VACC NO PRSV 0.5 ML IM: CPT | Performed by: INTERNAL MEDICINE

## 2020-09-23 PROCEDURE — 73562 X-RAY EXAM OF KNEE 3: CPT | Performed by: ORTHOPAEDIC SURGERY

## 2020-09-23 PROCEDURE — 84436 ASSAY OF TOTAL THYROXINE: CPT

## 2020-09-25 ENCOUNTER — HOSPITAL ENCOUNTER (OUTPATIENT)
Dept: BONE DENSITY | Age: 80
Discharge: HOME | End: 2020-09-25
Attending: INTERNAL MEDICINE
Payer: MEDICARE

## 2020-09-25 DIAGNOSIS — E55.9 VITAMIN D DEFICIENCY: ICD-10-CM

## 2020-09-25 PROCEDURE — 77080 DXA BONE DENSITY AXIAL: CPT | Performed by: INTERNAL MEDICINE

## 2020-09-28 ENCOUNTER — TELEPHONE (OUTPATIENT)
Dept: INTERNAL MEDICINE CLINIC | Facility: CLINIC | Age: 80
End: 2020-09-28

## 2020-09-28 DIAGNOSIS — E03.8 OTHER SPECIFIED HYPOTHYROIDISM: Primary | ICD-10-CM

## 2020-09-28 RX ORDER — LEVOTHYROXINE SODIUM 0.05 MG/1
50 TABLET ORAL
Qty: 90 TABLET | Refills: 0 | Status: SHIPPED | OUTPATIENT
Start: 2020-09-28 | End: 2020-12-22

## 2020-09-28 NOTE — TELEPHONE ENCOUNTER
Asad Slater MD   9/26/2020 10:47 AM      Reviewed results patient to decrease -levothyroxine to 50 MCG daily.  Recheck in 6 weeks  Please place an order for free T4 and TSH in 6 weeks     Pt notified of results and provider instructions.   Pt verbalized

## 2020-09-29 RX ORDER — ALENDRONATE SODIUM 70 MG/1
TABLET ORAL
Qty: 4 TABLET | Refills: 1 | OUTPATIENT
Start: 2020-09-29

## 2020-09-29 RX ORDER — ALENDRONATE SODIUM 70 MG/1
TABLET ORAL
Qty: 4 TABLET | Refills: 1 | Status: SHIPPED | OUTPATIENT
Start: 2020-09-29 | End: 2020-09-30

## 2020-09-29 NOTE — TELEPHONE ENCOUNTER
Passed protocol    Requesting alendronate 70 MG Oral Tab  LOV: 9/17/20  RTC: 6 months  Last Relevant Labs: 7/10/20  Filled: 4/13/20 #4 with 1 refills    Future Appointments   Date Time Provider Theodora Melara   9/30/2020  2:30 PM Steven De La Fuente MD [facility]

## 2020-09-30 ENCOUNTER — OFFICE VISIT (OUTPATIENT)
Dept: ORTHOPEDICS CLINIC | Facility: CLINIC | Age: 80
End: 2020-09-30
Payer: MEDICARE

## 2020-09-30 VITALS — HEART RATE: 57 BPM | OXYGEN SATURATION: 99 %

## 2020-09-30 DIAGNOSIS — M17.11 PRIMARY OSTEOARTHRITIS OF RIGHT KNEE: Primary | ICD-10-CM

## 2020-09-30 PROCEDURE — 99203 OFFICE O/P NEW LOW 30 MIN: CPT | Performed by: ORTHOPAEDIC SURGERY

## 2020-09-30 PROCEDURE — 20610 DRAIN/INJ JOINT/BURSA W/O US: CPT | Performed by: ORTHOPAEDIC SURGERY

## 2020-09-30 RX ORDER — ALENDRONATE SODIUM 70 MG/1
TABLET ORAL
Qty: 12 TABLET | Refills: 0 | Status: SHIPPED | OUTPATIENT
Start: 2020-09-30 | End: 2020-10-19

## 2020-09-30 RX ORDER — TRIAMCINOLONE ACETONIDE 40 MG/ML
40 INJECTION, SUSPENSION INTRA-ARTICULAR; INTRAMUSCULAR ONCE
Status: COMPLETED | OUTPATIENT
Start: 2020-09-30 | End: 2020-09-30

## 2020-09-30 RX ORDER — MELOXICAM 7.5 MG/1
7.5 TABLET ORAL DAILY
Qty: 90 TABLET | Refills: 1 | Status: SHIPPED | OUTPATIENT
Start: 2020-09-30 | End: 2020-12-29

## 2020-09-30 RX ADMIN — TRIAMCINOLONE ACETONIDE 40 MG: 40 INJECTION, SUSPENSION INTRA-ARTICULAR; INTRAMUSCULAR at 15:03:00

## 2020-09-30 NOTE — H&P
[facility] New Patient Note    CC: Patient presents with:  Knee Pain: right knee pain       HPI: This 68year old female presents today with complaints of right knee pain. Patient states the pain has been ongoing for approximately 2 months now.   Teri Arenas DAILY 180 tablet 0   • ATORVASTATIN 10 MG Oral Tab TAKE 1 TABLET BY MOUTH EVERY DAY 90 tablet 0   • aspirin 81 MG Oral Tab Take 1 tablet (81 mg total) by mouth once daily.  90 tablet 3     No Known Allergies  Family History   Problem Relation Age of Onset (KENALOG-40) 40 MG/ML injection 40 mg    Other orders  -     Meloxicam 7.5 MG Oral Tab; Take 1 tablet (7.5 mg total) by mouth daily.       Assessment: 51-year-old female with symptomatic right knee radiographically moderate osteoarthritis    Plan: I discuss

## 2020-09-30 NOTE — PATIENT INSTRUCTIONS
What Is Arthritis? Arthritis is a disease that affects the joints. Joints are the parts where bones meet and move. It can affect any joint in your body. There are many types of arthritis.  They include:   · Osteoarthritis  · Rheumatoid arthritis  · Gout  · The joint's range of motion can become limited. Symptoms  Arthritis can affect any joint. Weight-bearing joints, such as the hips and knees, are often affected. Common symptoms are joint pain and stiffness.  Pain and stiffness may get worse with inactivi pounds. Losing one single pound takes multiple pounds of pressure off the joints. Losing 10 pounds can take 50 pounds of pressure off the joints. The tips below may help:  · Start a weight-loss program with the help of your healthcare provider.   · Ask your exercise part of your life. Talk with your healthcare provider about what is safe for you. Make sure you:  · Choose exercises that improve joint motion and make your muscles stronger. Learn how to do exercises correctly and safely.  Consider talking with a Isometric exercises are done by tightening the muscles without moving the joint. This may be a good way to strengthen the muscles around a stiff joint. · Avoid deep flexion or deep squatting (do not bend the knee more than 90 degrees).   · Focus on closed- flexibility activities such as yoga and manish chi may improve pain and joint motion.  You might try:  · Yoga, including chair yoga, helps to keep your joints strong and flexible  · Manish Chi, an ancient type of exercise with slow, gentle movements  · Water exer provider to help reduce your pain and improve joint mobility.  Treatments may include:  · Topical medicines such as lidocaine, capsaicin, and diclofenac gel  · Oral medicines such as acetaminophen or NSAIDs (nonsteroidal anti-inflammatory drugs) such as ibu ceramic, or plastic. This is most often done with the knee or hip joint. · Other surgery. There are other surgical procedures specific to certain joints.       Get help and more information  · For more information on arthritis go to the Arthritis [facility]

## 2020-10-06 DIAGNOSIS — I10 ESSENTIAL HYPERTENSION: Chronic | ICD-10-CM

## 2020-10-06 RX ORDER — AMLODIPINE BESYLATE 5 MG/1
TABLET ORAL
Qty: 180 TABLET | Refills: 0 | Status: SHIPPED | OUTPATIENT
Start: 2020-10-06 | End: 2020-10-19

## 2020-10-06 NOTE — TELEPHONE ENCOUNTER
Name from pharmacy: AMLODIPINE BESYLATE 5MG TABLETS          Will file in chart as: AMLODIPINE BESYLATE 5 MG Oral Tab    Sig: TAKE 1 TABLET(5 MG) BY MOUTH TWICE DAILY    Disp:  180 tablet    Refills:  0 (Pharmacy requested: Not specified)    Start: 10/6/20

## 2020-10-17 ENCOUNTER — TELEPHONE (OUTPATIENT)
Dept: INTERNAL MEDICINE CLINIC | Facility: CLINIC | Age: 80
End: 2020-10-17

## 2020-10-17 NOTE — TELEPHONE ENCOUNTER
Krystal requesting refill for:  ATORVASTATIN 10 MG Oral Tab    [facility] #[id number] Rachel Gilberto, [address] AT THE [facility], [phone], [phone]

## 2020-10-19 ENCOUNTER — TELEPHONE (OUTPATIENT)
Dept: CASE MANAGEMENT | Age: 80
End: 2020-10-19

## 2020-10-19 DIAGNOSIS — I10 ESSENTIAL HYPERTENSION: Chronic | ICD-10-CM

## 2020-10-19 RX ORDER — METOPROLOL SUCCINATE 100 MG/1
100 TABLET, EXTENDED RELEASE ORAL DAILY
Qty: 90 TABLET | Refills: 0 | Status: SHIPPED | OUTPATIENT
Start: 2020-10-19 | End: 2020-11-12

## 2020-10-19 RX ORDER — ALENDRONATE SODIUM 70 MG/1
70 TABLET ORAL WEEKLY
Qty: 12 TABLET | Refills: 0 | Status: SHIPPED | OUTPATIENT
Start: 2020-10-19 | End: 2021-05-06

## 2020-10-19 RX ORDER — AMLODIPINE BESYLATE 5 MG/1
5 TABLET ORAL 2 TIMES DAILY
Qty: 180 TABLET | Refills: 0 | Status: SHIPPED | OUTPATIENT
Start: 2020-10-19 | End: 2021-05-03

## 2020-10-19 RX ORDER — ATORVASTATIN CALCIUM 10 MG/1
10 TABLET, FILM COATED ORAL DAILY
Qty: 90 TABLET | Refills: 0 | Status: CANCELLED | OUTPATIENT
Start: 2020-10-19

## 2020-10-19 RX ORDER — ATORVASTATIN CALCIUM 10 MG/1
10 TABLET, FILM COATED ORAL DAILY
Qty: 90 TABLET | Refills: 0 | Status: SHIPPED | OUTPATIENT
Start: 2020-10-19 | End: 2021-02-09

## 2020-10-19 NOTE — TELEPHONE ENCOUNTER
Reached patient for medication adherence consult. She is past due for refill on atorvastatin. She tells me the pharmacy had to reach out to the doctor for a new prescription.  She also reports she has trouble getting to the pharmacy to  her medic

## 2020-10-19 NOTE — TELEPHONE ENCOUNTER
Spoke to technician at [employer]. She confirmed that patient already has an account set up. Will send new prescriptions over to MD for review.  They will send her the medications that are ready to be refilled when they receive the prescript

## 2020-10-21 NOTE — TELEPHONE ENCOUNTER
Spoke with pt and she has switched her pharmacy to [facility] because they deliver. Pharmacy has been informed.

## 2020-11-10 DIAGNOSIS — I10 ESSENTIAL HYPERTENSION: Primary | Chronic | ICD-10-CM

## 2020-11-10 NOTE — TELEPHONE ENCOUNTER
Krystal requesting refill for:  Metoprolol Succinate  MG Oral Tablet 24 Hr    [facility] #[id number] - Paulina Ke, [address] AT [address] [place] [address], [phone], [phone]

## 2020-11-12 RX ORDER — METOPROLOL SUCCINATE 100 MG/1
100 TABLET, EXTENDED RELEASE ORAL DAILY
Qty: 90 TABLET | Refills: 1 | Status: SHIPPED | OUTPATIENT
Start: 2020-11-12 | End: 2021-07-12

## 2020-11-12 NOTE — TELEPHONE ENCOUNTER
Passed protocol    Requesting Metoprolol Succinate  MG Oral Tablet 24 Hr  LOV: 9/17/20  RTC: 6 months  Last Relevant Labs: 7/10/20  Filled: 10/19/20 #90 with 0 refills    No future appointments.

## 2020-12-18 ENCOUNTER — LAB ENCOUNTER (OUTPATIENT)
Dept: LAB | Age: 80
End: 2020-12-18
Attending: INTERNAL MEDICINE
Payer: MEDICARE

## 2020-12-18 DIAGNOSIS — E03.8 OTHER SPECIFIED HYPOTHYROIDISM: ICD-10-CM

## 2020-12-18 PROCEDURE — 36415 COLL VENOUS BLD VENIPUNCTURE: CPT

## 2020-12-18 PROCEDURE — 84439 ASSAY OF FREE THYROXINE: CPT

## 2020-12-18 PROCEDURE — 84443 ASSAY THYROID STIM HORMONE: CPT

## 2020-12-22 RX ORDER — LEVOTHYROXINE SODIUM 0.05 MG/1
50 TABLET ORAL
Qty: 90 TABLET | Refills: 1 | Status: SHIPPED | OUTPATIENT
Start: 2020-12-22 | End: 2021-07-12

## 2020-12-22 NOTE — TELEPHONE ENCOUNTER
Passed protocol      Requesting Levothyroxine Sodium 50 MCG Oral Tab  LOV: 9/17/20  RTC: 6 months  Last Relevant Labs: 12/18/20  Filled: 9/28/20 #90 with 0 refills    No future appointments.

## 2021-01-20 ENCOUNTER — TELEPHONE (OUTPATIENT)
Dept: INTERNAL MEDICINE CLINIC | Facility: CLINIC | Age: 81
End: 2021-01-20

## 2021-01-20 NOTE — TELEPHONE ENCOUNTER
Patient called stating that she will be having teeth extraction.  Per patient, form was faxed for approval.    Lily Grimaldo Dentist -   [phone]

## 2021-01-21 NOTE — TELEPHONE ENCOUNTER
Spoke with dental office and she stated that the form was never faxed due to them not having our fax number - they informed me that the form was given to the pt to bring in. I requested they fax one for our records.

## 2021-01-21 NOTE — TELEPHONE ENCOUNTER
I have not received anything. And she would need to come in and be seen by me or Dorys to clear her. Or it can wait till Monday for doctor to sign.

## 2021-01-21 NOTE — TELEPHONE ENCOUNTER
Nothing has been received.  VM was left on dental office phone to return call to have form faxed again

## 2021-01-25 NOTE — TELEPHONE ENCOUNTER
Form signed by provider stating that pt is ok to have extraction - form faxed to [address] [place] [address] at [phone] - fax confirmation received. Copies made for scan and pod folder.

## 2021-02-08 DIAGNOSIS — E78.5 HYPERLIPIDEMIA, UNSPECIFIED HYPERLIPIDEMIA TYPE: Primary | Chronic | ICD-10-CM

## 2021-02-09 RX ORDER — ATORVASTATIN CALCIUM 10 MG/1
10 TABLET, FILM COATED ORAL DAILY
Qty: 90 TABLET | Refills: 0 | Status: SHIPPED | OUTPATIENT
Start: 2021-02-09 | End: 2021-05-03

## 2021-02-09 NOTE — TELEPHONE ENCOUNTER
Passed protocol    Requesting atorvastatin 10 MG Oral Tab  LOV: 9/17/20  RTC: 6 months  Last Relevant Labs: 7/10/20  Filled: 10/19/2020 #90 with 0 refills    No future appointments.

## 2021-03-01 NOTE — TELEPHONE ENCOUNTER
Re-faxed clearance to [address] [place] [address]   Fax confirmation received   Pt notified

## 2021-03-01 NOTE — TELEPHONE ENCOUNTER
Patient called and stated that [address] [place] [address] hasn't received the fax for the dental clearance. Please resend fax. Patient stated she needs to get in before March 6th because it's the only date that works with her work schedule.      BYE:[phone]

## 2021-03-12 DIAGNOSIS — Z23 NEED FOR VACCINATION: ICD-10-CM

## 2021-04-30 DIAGNOSIS — I10 ESSENTIAL HYPERTENSION: Chronic | ICD-10-CM

## 2021-05-01 DIAGNOSIS — E78.5 HYPERLIPIDEMIA, UNSPECIFIED HYPERLIPIDEMIA TYPE: Chronic | ICD-10-CM

## 2021-05-03 RX ORDER — ATORVASTATIN CALCIUM 10 MG/1
TABLET, FILM COATED ORAL
Qty: 90 TABLET | Refills: 0 | Status: SHIPPED | OUTPATIENT
Start: 2021-05-03 | End: 2021-07-31

## 2021-05-03 RX ORDER — AMLODIPINE BESYLATE 5 MG/1
TABLET ORAL
Qty: 180 TABLET | Refills: 0 | Status: SHIPPED | OUTPATIENT
Start: 2021-05-03 | End: 2021-05-06

## 2021-05-03 NOTE — TELEPHONE ENCOUNTER
Passed protocol    Requesting atorvastatin 10 MG Oral Tab  LOV: 9/17/20  RTC: 6 months  Last Relevant Labs: 7/10/20  Filled: 2/9/21 #90 with 0 refills    Future Appointments   Date Time Provider [facility] Saritha   5/6/2021  1:30 PM Jesse Shoemaker MD [facility]

## 2021-05-03 NOTE — TELEPHONE ENCOUNTER
Appointment needed - [employer] message sent to pt to contact office to schedule BP follow up    Requesting amLODIPine Besylate 5 MG Oral Tab  LOV: 9/17/20  RTC: 6 months  Last Relevant Labs: 7/10/20  Filled: 10/19/20 #180 with 0 refills    No future appointm

## 2021-05-03 NOTE — TELEPHONE ENCOUNTER
Pt has made an apt for Thursday 5/6-   She is in need of a refill on amlodipine 5mg twice daily as well she only has a couple of pills left.

## 2021-05-06 ENCOUNTER — OFFICE VISIT (OUTPATIENT)
Dept: INTERNAL MEDICINE CLINIC | Facility: CLINIC | Age: 81
End: 2021-05-06
Payer: MEDICARE

## 2021-05-06 VITALS
SYSTOLIC BLOOD PRESSURE: 100 MMHG | HEIGHT: 61.5 IN | DIASTOLIC BLOOD PRESSURE: 70 MMHG | WEIGHT: 139.19 LBS | BODY MASS INDEX: 25.95 KG/M2 | RESPIRATION RATE: 16 BRPM | TEMPERATURE: 97 F | HEART RATE: 58 BPM | OXYGEN SATURATION: 99 %

## 2021-05-06 DIAGNOSIS — R73.03 PREDIABETES: ICD-10-CM

## 2021-05-06 DIAGNOSIS — I10 ESSENTIAL HYPERTENSION: Primary | ICD-10-CM

## 2021-05-06 DIAGNOSIS — E78.5 HYPERLIPIDEMIA, UNSPECIFIED HYPERLIPIDEMIA TYPE: ICD-10-CM

## 2021-05-06 PROCEDURE — 99213 OFFICE O/P EST LOW 20 MIN: CPT | Performed by: INTERNAL MEDICINE

## 2021-05-06 RX ORDER — ALENDRONATE SODIUM 70 MG/1
70 TABLET ORAL WEEKLY
Qty: 12 TABLET | Refills: 0 | Status: SHIPPED | OUTPATIENT
Start: 2021-05-06 | End: 2021-05-06 | Stop reason: ALTCHOICE

## 2021-05-06 RX ORDER — AMLODIPINE BESYLATE 5 MG/1
5 TABLET ORAL DAILY
Qty: 180 TABLET | Refills: 0 | COMMUNITY
Start: 2021-05-06 | End: 2021-07-12

## 2021-05-06 RX ORDER — QUINIDINE GLUCONATE 324 MG
1 TABLET, EXTENDED RELEASE ORAL 2 TIMES DAILY
COMMUNITY

## 2021-05-06 NOTE — PROGRESS NOTES
Sonal Reyes  1940 is a 68year old female. Patient presents with:   Follow - Up: medication        HPI:     Current Outpatient Medications   Medication Sig Dispense Refill   • Glucosamine-Chondroit-Vit C-Mn (GLUCOSAMINE-CHONDROITIN) Oral Tab SpO2 99%   BMI 25.88 kg/m²   HEENT:   jvp not raised. Ear canals: normal.   Ear drums: normal .   Ears: unremarkable. Mouth: unremarkable. Nasal septum: midline. Pharynx: normal.   Sinuses: non-tender.    HEART:   Clicks: no.   Distal Pulses Palpabl

## 2021-05-18 ENCOUNTER — LAB ENCOUNTER (OUTPATIENT)
Dept: LAB | Age: 81
End: 2021-05-18
Attending: INTERNAL MEDICINE
Payer: MEDICARE

## 2021-05-18 DIAGNOSIS — I10 ESSENTIAL HYPERTENSION: ICD-10-CM

## 2021-05-18 DIAGNOSIS — R73.03 PREDIABETES: ICD-10-CM

## 2021-05-18 DIAGNOSIS — E78.5 HYPERLIPIDEMIA, UNSPECIFIED HYPERLIPIDEMIA TYPE: ICD-10-CM

## 2021-05-18 PROCEDURE — 80061 LIPID PANEL: CPT

## 2021-05-18 PROCEDURE — 83036 HEMOGLOBIN GLYCOSYLATED A1C: CPT

## 2021-05-18 PROCEDURE — 36415 COLL VENOUS BLD VENIPUNCTURE: CPT

## 2021-05-18 PROCEDURE — 80053 COMPREHEN METABOLIC PANEL: CPT

## 2021-07-12 ENCOUNTER — TELEPHONE (OUTPATIENT)
Dept: INTERNAL MEDICINE CLINIC | Facility: CLINIC | Age: 81
End: 2021-07-12

## 2021-07-12 ENCOUNTER — OFFICE VISIT (OUTPATIENT)
Dept: INTERNAL MEDICINE CLINIC | Facility: CLINIC | Age: 81
End: 2021-07-12
Payer: MEDICARE

## 2021-07-12 VITALS
SYSTOLIC BLOOD PRESSURE: 158 MMHG | HEIGHT: 61.5 IN | RESPIRATION RATE: 16 BRPM | DIASTOLIC BLOOD PRESSURE: 72 MMHG | BODY MASS INDEX: 25.91 KG/M2 | OXYGEN SATURATION: 98 % | HEART RATE: 64 BPM | WEIGHT: 139 LBS | TEMPERATURE: 98 F

## 2021-07-12 DIAGNOSIS — I10 ESSENTIAL HYPERTENSION: Primary | Chronic | ICD-10-CM

## 2021-07-12 DIAGNOSIS — R30.0 BURNING WITH URINATION: ICD-10-CM

## 2021-07-12 LAB
BILIRUB UR QL STRIP.AUTO: NEGATIVE
CLARITY UR REFRACT.AUTO: CLEAR
GLUCOSE UR STRIP.AUTO-MCNC: NEGATIVE MG/DL
KETONES UR STRIP.AUTO-MCNC: NEGATIVE MG/DL
LEUKOCYTE ESTERASE UR QL STRIP.AUTO: NEGATIVE
NITRITE UR QL STRIP.AUTO: NEGATIVE
PH UR STRIP.AUTO: 6 [PH] (ref 5–8)
PROT UR STRIP.AUTO-MCNC: NEGATIVE MG/DL
SP GR UR STRIP.AUTO: 1 (ref 1–1.03)
UROBILINOGEN UR STRIP.AUTO-MCNC: <2 MG/DL

## 2021-07-12 PROCEDURE — 87086 URINE CULTURE/COLONY COUNT: CPT | Performed by: INTERNAL MEDICINE

## 2021-07-12 PROCEDURE — 81001 URINALYSIS AUTO W/SCOPE: CPT | Performed by: INTERNAL MEDICINE

## 2021-07-12 PROCEDURE — 99213 OFFICE O/P EST LOW 20 MIN: CPT | Performed by: INTERNAL MEDICINE

## 2021-07-12 RX ORDER — AMLODIPINE BESYLATE 5 MG/1
5 TABLET ORAL 2 TIMES DAILY
Qty: 180 TABLET | Refills: 0 | Status: SHIPPED | OUTPATIENT
Start: 2021-07-12 | End: 2021-10-18

## 2021-07-12 RX ORDER — LEVOTHYROXINE SODIUM 0.05 MG/1
50 TABLET ORAL
Qty: 90 TABLET | Refills: 1 | Status: SHIPPED | OUTPATIENT
Start: 2021-07-12 | End: 2022-01-11

## 2021-07-12 RX ORDER — METOPROLOL SUCCINATE 100 MG/1
100 TABLET, EXTENDED RELEASE ORAL DAILY
Qty: 90 TABLET | Refills: 1 | Status: SHIPPED | OUTPATIENT
Start: 2021-07-12 | End: 2022-01-27

## 2021-07-12 NOTE — TELEPHONE ENCOUNTER
Pt reports     Elevated BP since this past Friday  Headache but states it beginning to improve at this time  Last /82, pt states she is unable to re-check BP during time of call  Pt took BP medication this morning     Denies shortness of breath/diffi

## 2021-07-12 NOTE — PROGRESS NOTES
Sudeep Alamo  1940 is a 68year old female. Patient presents with:  Hypertension  Urinary Symptoms       HPI:   BP numbers were elevated at home.   Also did have a little burning urination  Current Outpatient Medications   Medication Sig Disp (63 kg)   SpO2 98%   BMI 25.84 kg/m²   HEENT:   jvp not raised. Ear canals: normal.   Ear drums: normal .   Ears: unremarkable. Mouth: unremarkable. Nasal septum: midline. Pharynx: normal.   Sinuses: non-tender.    HEART:   Clicks: no.   Distal Puls

## 2021-07-30 DIAGNOSIS — E78.5 HYPERLIPIDEMIA, UNSPECIFIED HYPERLIPIDEMIA TYPE: Chronic | ICD-10-CM

## 2021-07-31 RX ORDER — ATORVASTATIN CALCIUM 10 MG/1
TABLET, FILM COATED ORAL
Qty: 90 TABLET | Refills: 0 | Status: SHIPPED | OUTPATIENT
Start: 2021-07-31 | End: 2021-10-28

## 2021-07-31 NOTE — TELEPHONE ENCOUNTER
Passed protocol        Requesting ATORVASTATIN 10 MG Oral Tab  LOV: 7/12/21  RTC: 2 weeks  Last Relevant Labs: 5/18/21  Filled: 5/3/21 #90 with 0 refills    Future Appointments   Date Time Provider Theodora Melara   9/28/2021  2:00 PM Scot Ortega MD

## 2021-09-28 ENCOUNTER — OFFICE VISIT (OUTPATIENT)
Dept: INTERNAL MEDICINE CLINIC | Facility: CLINIC | Age: 81
End: 2021-09-28
Payer: MEDICARE

## 2021-09-28 VITALS
SYSTOLIC BLOOD PRESSURE: 130 MMHG | BODY MASS INDEX: 25.91 KG/M2 | WEIGHT: 139 LBS | HEART RATE: 59 BPM | DIASTOLIC BLOOD PRESSURE: 70 MMHG | HEIGHT: 61.5 IN | TEMPERATURE: 98 F | OXYGEN SATURATION: 100 % | RESPIRATION RATE: 16 BRPM

## 2021-09-28 DIAGNOSIS — E78.5 HYPERLIPIDEMIA, UNSPECIFIED HYPERLIPIDEMIA TYPE: Chronic | ICD-10-CM

## 2021-09-28 DIAGNOSIS — I10 PRIMARY HYPERTENSION: ICD-10-CM

## 2021-09-28 DIAGNOSIS — Z00.00 ROUTINE GENERAL MEDICAL EXAMINATION AT A HEALTH CARE FACILITY: Primary | ICD-10-CM

## 2021-09-28 DIAGNOSIS — E55.9 VITAMIN D DEFICIENCY: Chronic | ICD-10-CM

## 2021-09-28 DIAGNOSIS — R73.03 PREDIABETES: Chronic | ICD-10-CM

## 2021-09-28 DIAGNOSIS — E03.8 OTHER SPECIFIED HYPOTHYROIDISM: Chronic | ICD-10-CM

## 2021-09-28 PROBLEM — M17.11 PRIMARY OSTEOARTHRITIS OF RIGHT KNEE: Chronic | Status: ACTIVE | Noted: 2020-09-17

## 2021-09-28 PROCEDURE — G0439 PPPS, SUBSEQ VISIT: HCPCS | Performed by: INTERNAL MEDICINE

## 2021-09-28 RX ORDER — ELECTROLYTES/DEXTROSE
SOLUTION, ORAL ORAL
COMMUNITY

## 2021-09-28 RX ORDER — MULTIVIT,TX WITH IRON,MINERALS
TABLET, EXTENDED RELEASE ORAL
COMMUNITY

## 2021-09-28 RX ORDER — RIBOFLAVIN (VITAMIN B2) 100 MG
100 TABLET ORAL DAILY
COMMUNITY

## 2021-09-28 RX ORDER — MELATONIN
1000 DAILY
COMMUNITY

## 2021-09-28 NOTE — PROGRESS NOTES
Iman Stern [facility] 1940 is a 80year old female. Patient presents with:  Wellness Visit: AWV      HPI:   See below   Current Outpatient Medications   Medication Sig Dispense Refill   • Zinc 50 MG Oral Tab Take by mouth.      • Vitamin D3, Cholecal HEENT/Neck:   Head no headache, no dizziness, no lightheadedness. Eyes normal vision, no redness, no drainage. Ears no earaches, no fullness, normal hearing, no tinnitus. Nose and Sinuses no colds, no discharge, no hay fever, no sinus trouble.  Mouth and Build: normal .   General Appearance: alert and oriented, pleasant. HEENT:   Ear canals: normal.   EOM: within normal limit-conjunctiva normal.   Head: normocephalic. Nasal septum: midline. Nose: unremarkable.    Oral cavity: normal.   Pupils: logan visit:    Routine general medical examination at a health care facility  -     CBC WITH DIFFERENTIAL WITH PLATELET; Future  -     COMP METABOLIC PANEL (14);  Future  -     URINALYSIS, ROUTINE; Future    Primary hypertension  -     Cancel: ELECTROCARDIOGRAM,

## 2021-10-17 DIAGNOSIS — I10 ESSENTIAL HYPERTENSION: Chronic | ICD-10-CM

## 2021-10-18 RX ORDER — AMLODIPINE BESYLATE 5 MG/1
TABLET ORAL
Qty: 180 TABLET | Refills: 0 | Status: SHIPPED | OUTPATIENT
Start: 2021-10-18 | End: 2021-12-21

## 2021-10-18 NOTE — TELEPHONE ENCOUNTER
LOV: 9/28/2021  F/U discussed: \"Return in about 3 months (around 12/28/2021). \"  Last Filled: 7/12/2021 #180 R:0    No future appointments. Refilled per protocol.

## 2021-10-28 DIAGNOSIS — E78.5 HYPERLIPIDEMIA, UNSPECIFIED HYPERLIPIDEMIA TYPE: Chronic | ICD-10-CM

## 2021-10-28 RX ORDER — ATORVASTATIN CALCIUM 10 MG/1
TABLET, FILM COATED ORAL
Qty: 90 TABLET | Refills: 0 | Status: SHIPPED | OUTPATIENT
Start: 2021-10-28 | End: 2022-01-27

## 2021-12-07 ENCOUNTER — LAB ENCOUNTER (OUTPATIENT)
Dept: LAB | Age: 81
End: 2021-12-07
Attending: INTERNAL MEDICINE
Payer: MEDICARE

## 2021-12-07 DIAGNOSIS — D75.89 MACROCYTOSIS: ICD-10-CM

## 2021-12-07 DIAGNOSIS — R73.03 PREDIABETES: Chronic | ICD-10-CM

## 2021-12-07 DIAGNOSIS — E03.8 OTHER SPECIFIED HYPOTHYROIDISM: Chronic | ICD-10-CM

## 2021-12-07 DIAGNOSIS — E78.5 HYPERLIPIDEMIA, UNSPECIFIED HYPERLIPIDEMIA TYPE: Chronic | ICD-10-CM

## 2021-12-07 DIAGNOSIS — Z00.00 ROUTINE GENERAL MEDICAL EXAMINATION AT A HEALTH CARE FACILITY: ICD-10-CM

## 2021-12-07 DIAGNOSIS — R79.89 ABNORMAL THYROID BLOOD TEST: ICD-10-CM

## 2021-12-07 DIAGNOSIS — E55.9 VITAMIN D DEFICIENCY: ICD-10-CM

## 2021-12-07 PROCEDURE — 36415 COLL VENOUS BLD VENIPUNCTURE: CPT

## 2021-12-07 PROCEDURE — 84443 ASSAY THYROID STIM HORMONE: CPT

## 2021-12-07 PROCEDURE — 80061 LIPID PANEL: CPT

## 2021-12-07 PROCEDURE — 85025 COMPLETE CBC W/AUTO DIFF WBC: CPT

## 2021-12-07 PROCEDURE — 81001 URINALYSIS AUTO W/SCOPE: CPT

## 2021-12-07 PROCEDURE — 82607 VITAMIN B-12: CPT

## 2021-12-07 PROCEDURE — 80053 COMPREHEN METABOLIC PANEL: CPT

## 2021-12-07 PROCEDURE — 82306 VITAMIN D 25 HYDROXY: CPT

## 2021-12-07 PROCEDURE — 83036 HEMOGLOBIN GLYCOSYLATED A1C: CPT

## 2021-12-07 PROCEDURE — 84436 ASSAY OF TOTAL THYROXINE: CPT

## 2021-12-07 PROCEDURE — 84439 ASSAY OF FREE THYROXINE: CPT

## 2021-12-08 ENCOUNTER — TELEPHONE (OUTPATIENT)
Dept: INTERNAL MEDICINE CLINIC | Facility: CLINIC | Age: 81
End: 2021-12-08

## 2021-12-08 DIAGNOSIS — D75.89 MACROCYTOSIS: ICD-10-CM

## 2021-12-08 DIAGNOSIS — R79.89 ABNORMAL THYROID BLOOD TEST: Primary | ICD-10-CM

## 2021-12-08 DIAGNOSIS — R31.9 HEMATURIA, UNSPECIFIED TYPE: ICD-10-CM

## 2021-12-08 NOTE — TELEPHONE ENCOUNTER
----- Message from Serene Ramos MD sent at 12/8/2021  6:14 AM CST -----  Rpt urine analysis and c/s  Order free t4-dx abn thyroid ntest  Order H72 and folic acid=dx macrocytosis   Rest of the blood is ok

## 2021-12-09 ENCOUNTER — LAB ENCOUNTER (OUTPATIENT)
Dept: LAB | Age: 81
End: 2021-12-09
Attending: INTERNAL MEDICINE
Payer: MEDICARE

## 2021-12-09 DIAGNOSIS — E03.8 OTHER SPECIFIED HYPOTHYROIDISM: Chronic | ICD-10-CM

## 2021-12-09 DIAGNOSIS — E78.5 HYPERLIPIDEMIA, UNSPECIFIED HYPERLIPIDEMIA TYPE: Chronic | ICD-10-CM

## 2021-12-09 DIAGNOSIS — E55.9 VITAMIN D DEFICIENCY: ICD-10-CM

## 2021-12-09 DIAGNOSIS — D75.89 MACROCYTOSIS: ICD-10-CM

## 2021-12-09 DIAGNOSIS — R79.89 ABNORMAL THYROID BLOOD TEST: ICD-10-CM

## 2021-12-09 DIAGNOSIS — Z00.00 ROUTINE GENERAL MEDICAL EXAMINATION AT A HEALTH CARE FACILITY: ICD-10-CM

## 2021-12-09 DIAGNOSIS — R73.03 PREDIABETES: Chronic | ICD-10-CM

## 2021-12-09 DIAGNOSIS — R31.9 HEMATURIA, UNSPECIFIED TYPE: ICD-10-CM

## 2021-12-09 PROCEDURE — 87186 SC STD MICRODIL/AGAR DIL: CPT

## 2021-12-09 PROCEDURE — 87088 URINE BACTERIA CULTURE: CPT

## 2021-12-09 PROCEDURE — 82746 ASSAY OF FOLIC ACID SERUM: CPT

## 2021-12-09 PROCEDURE — 84439 ASSAY OF FREE THYROXINE: CPT

## 2021-12-09 PROCEDURE — 81001 URINALYSIS AUTO W/SCOPE: CPT

## 2021-12-09 PROCEDURE — 36415 COLL VENOUS BLD VENIPUNCTURE: CPT

## 2021-12-09 PROCEDURE — 82607 VITAMIN B-12: CPT

## 2021-12-09 PROCEDURE — 87086 URINE CULTURE/COLONY COUNT: CPT

## 2021-12-12 RX ORDER — CIPROFLOXACIN 500 MG/1
500 TABLET, FILM COATED ORAL 2 TIMES DAILY
Qty: 10 TABLET | Refills: 0 | Status: SHIPPED | OUTPATIENT
Start: 2021-12-12 | End: 2021-12-17

## 2021-12-21 ENCOUNTER — TELEPHONE (OUTPATIENT)
Dept: INTERNAL MEDICINE CLINIC | Facility: CLINIC | Age: 81
End: 2021-12-21

## 2021-12-21 ENCOUNTER — TELEMEDICINE (OUTPATIENT)
Dept: INTERNAL MEDICINE CLINIC | Facility: CLINIC | Age: 81
End: 2021-12-21
Payer: MEDICARE

## 2021-12-21 DIAGNOSIS — I10 ESSENTIAL HYPERTENSION: Chronic | ICD-10-CM

## 2021-12-21 DIAGNOSIS — I10 PRIMARY HYPERTENSION: Primary | ICD-10-CM

## 2021-12-21 DIAGNOSIS — R35.0 FREQUENT URINATION: Primary | ICD-10-CM

## 2021-12-21 PROCEDURE — 99213 OFFICE O/P EST LOW 20 MIN: CPT | Performed by: INTERNAL MEDICINE

## 2021-12-21 RX ORDER — LOSARTAN POTASSIUM AND HYDROCHLOROTHIAZIDE 12.5; 5 MG/1; MG/1
1 TABLET ORAL DAILY
Qty: 90 TABLET | Refills: 1 | Status: SHIPPED | OUTPATIENT
Start: 2021-12-21 | End: 2022-12-16

## 2021-12-21 RX ORDER — AMLODIPINE BESYLATE 5 MG/1
5 TABLET ORAL DAILY
Qty: 180 TABLET | Refills: 0 | COMMUNITY
Start: 2021-12-21

## 2021-12-21 NOTE — PROGRESS NOTES
Virtual Telephone Check-In    Lisset Doyle verbally consents to a Virtual/Telephone Check-In visit on 12/21/21. Patient has been referred to the [facility] website at [email or web address] to review the yearly Consent to Treat document.     Patient unders

## 2021-12-21 NOTE — TELEPHONE ENCOUNTER
Spoke with patient, would like a bedside commode for frequent urination. Informed patient to contact her insurance company to see which supply company will cover. Patient to call back with supply company once speaking with her insurance.

## 2021-12-21 NOTE — TELEPHONE ENCOUNTER
Pt called with medical supply company information, insurance gave her 3 and they are all within 15 miles from her home.     1) [facility]    [phone]  2) [address] [place] [address] [place]  3) [address] [facility]   [phone]

## 2021-12-21 NOTE — TELEPHONE ENCOUNTER
Called [employer], they confirmed they can supply bedside commodes. Referral for bedside commode for frequent urination pending if appropriate.

## 2022-01-10 NOTE — TELEPHONE ENCOUNTER
Protocol failed     Requesting: levothyroxine 50mcg     LOV: 9/28/21   RTC: 3 months   Filled: 7/12/21 #90 1 refill   Recent Labs: 12/7/21     Upcoming OV: none scheduled

## 2022-01-11 RX ORDER — LEVOTHYROXINE SODIUM 0.05 MG/1
TABLET ORAL
Qty: 90 TABLET | Refills: 1 | Status: SHIPPED | OUTPATIENT
Start: 2022-01-11

## 2022-01-11 NOTE — TELEPHONE ENCOUNTER
Referral authorized and faxed to [facility] [place] [facility] at [phone]. Fax confirmation was received.

## 2022-01-26 DIAGNOSIS — E78.5 HYPERLIPIDEMIA, UNSPECIFIED HYPERLIPIDEMIA TYPE: Chronic | ICD-10-CM

## 2022-01-26 DIAGNOSIS — I10 ESSENTIAL HYPERTENSION: Chronic | ICD-10-CM

## 2022-01-26 NOTE — TELEPHONE ENCOUNTER
Requesting:    Name from pharmacy: METOPROLOL ER SUCCINATE 100MG TABS         Will file in chart as: METOPROLOL SUCCINATE 100 MG Oral Tablet 24 Hr    Sig: TAKE 1 TABLET(100 MG) BY MOUTH DAILY    Disp:  90 tablet    Refills:  1 (Pharmacy requested: Not spec

## 2022-01-27 RX ORDER — METOPROLOL SUCCINATE 100 MG/1
TABLET, EXTENDED RELEASE ORAL
Qty: 90 TABLET | Refills: 1 | Status: SHIPPED | OUTPATIENT
Start: 2022-01-27

## 2022-01-27 RX ORDER — ATORVASTATIN CALCIUM 10 MG/1
TABLET, FILM COATED ORAL
Qty: 90 TABLET | Refills: 0 | Status: SHIPPED | OUTPATIENT
Start: 2022-01-27

## 2022-02-24 ENCOUNTER — OFFICE VISIT (OUTPATIENT)
Dept: INTERNAL MEDICINE CLINIC | Facility: CLINIC | Age: 82
End: 2022-02-24
Payer: MEDICARE

## 2022-02-24 ENCOUNTER — LAB ENCOUNTER (OUTPATIENT)
Dept: LAB | Age: 82
End: 2022-02-24
Attending: INTERNAL MEDICINE
Payer: MEDICARE

## 2022-02-24 VITALS
BODY MASS INDEX: 25.05 KG/M2 | HEIGHT: 61.5 IN | RESPIRATION RATE: 12 BRPM | SYSTOLIC BLOOD PRESSURE: 130 MMHG | WEIGHT: 134.38 LBS | OXYGEN SATURATION: 99 % | TEMPERATURE: 98 F | DIASTOLIC BLOOD PRESSURE: 68 MMHG | HEART RATE: 56 BPM

## 2022-02-24 DIAGNOSIS — I10 PRIMARY HYPERTENSION: Primary | Chronic | ICD-10-CM

## 2022-02-24 DIAGNOSIS — M25.522 LEFT ELBOW PAIN: ICD-10-CM

## 2022-02-24 DIAGNOSIS — E03.8 OTHER SPECIFIED HYPOTHYROIDISM: ICD-10-CM

## 2022-02-24 DIAGNOSIS — R07.89 ATYPICAL CHEST PAIN: ICD-10-CM

## 2022-02-24 DIAGNOSIS — R94.39 ABNORMAL STRESS TEST: ICD-10-CM

## 2022-02-24 LAB
T4 FREE SERPL-MCNC: 0.9 NG/DL (ref 0.8–1.7)
TSI SER-ACNC: 19.2 MIU/ML (ref 0.36–3.74)

## 2022-02-24 PROCEDURE — 84443 ASSAY THYROID STIM HORMONE: CPT

## 2022-02-24 PROCEDURE — 84439 ASSAY OF FREE THYROXINE: CPT

## 2022-02-24 PROCEDURE — 36415 COLL VENOUS BLD VENIPUNCTURE: CPT

## 2022-02-24 PROCEDURE — 99214 OFFICE O/P EST MOD 30 MIN: CPT | Performed by: INTERNAL MEDICINE

## 2022-02-24 PROCEDURE — 93000 ELECTROCARDIOGRAM COMPLETE: CPT | Performed by: INTERNAL MEDICINE

## 2022-02-26 RX ORDER — LEVOTHYROXINE SODIUM 0.05 MG/1
50 TABLET ORAL AS DIRECTED
Qty: 90 TABLET | Refills: 0 | Status: SHIPPED | OUTPATIENT
Start: 2022-02-26

## 2022-02-26 RX ORDER — LEVOTHYROXINE SODIUM 0.07 MG/1
75 TABLET ORAL AS DIRECTED
Qty: 90 TABLET | Refills: 0 | Status: SHIPPED | OUTPATIENT
Start: 2022-02-26

## 2022-04-18 ENCOUNTER — TELEPHONE (OUTPATIENT)
Dept: INTERNAL MEDICINE CLINIC | Facility: CLINIC | Age: 82
End: 2022-04-18

## 2022-04-18 NOTE — TELEPHONE ENCOUNTER
Tammy with [facility] requesting prescription to be sent for bed side commode on behalf of pt. Dawit Laboy stated prescription can be sent directly to [address] [place] [address].      [facility]   [phone] - phone  2  - fax     Dawit Laboy - [phone], direct cell

## 2022-04-22 ENCOUNTER — TELEPHONE (OUTPATIENT)
Dept: INTERNAL MEDICINE CLINIC | Facility: CLINIC | Age: 82
End: 2022-04-22

## 2022-04-22 DIAGNOSIS — L60.0 INGROWN TOENAIL OF RIGHT FOOT WITH INFECTION: Primary | ICD-10-CM

## 2022-04-22 NOTE — TELEPHONE ENCOUNTER
Pt requesting for a referral to a podiatrist in the area due to a R toe nail infection. Please advise.

## 2022-04-27 RX ORDER — ATORVASTATIN CALCIUM 10 MG/1
TABLET, FILM COATED ORAL
Qty: 90 TABLET | Refills: 0 | Status: SHIPPED | OUTPATIENT
Start: 2022-04-27

## 2022-04-28 ENCOUNTER — TELEPHONE (OUTPATIENT)
Dept: INTERNAL MEDICINE CLINIC | Facility: CLINIC | Age: 82
End: 2022-04-28

## 2022-04-28 ENCOUNTER — OFFICE VISIT (OUTPATIENT)
Dept: INTERNAL MEDICINE CLINIC | Facility: CLINIC | Age: 82
End: 2022-04-28
Payer: MEDICARE

## 2022-04-28 VITALS
DIASTOLIC BLOOD PRESSURE: 68 MMHG | WEIGHT: 133.81 LBS | SYSTOLIC BLOOD PRESSURE: 138 MMHG | OXYGEN SATURATION: 98 % | BODY MASS INDEX: 24.94 KG/M2 | HEART RATE: 54 BPM | HEIGHT: 61.5 IN | TEMPERATURE: 98 F | RESPIRATION RATE: 16 BRPM

## 2022-04-28 DIAGNOSIS — L60.3 DYSTROPHIC NAIL: Primary | ICD-10-CM

## 2022-04-28 PROCEDURE — 99213 OFFICE O/P EST LOW 20 MIN: CPT | Performed by: INTERNAL MEDICINE

## 2022-04-28 NOTE — PATIENT INSTRUCTIONS
10 minutes was spent trying to locate the records apparently the records are not sure scripts which are not compatible with epic. We will call [facility] and try to get the records. Also the patient requesting bedside commode. Nurse was asked to get in touch with her.

## 2022-05-17 ENCOUNTER — OFFICE VISIT (OUTPATIENT)
Dept: ORTHOPEDICS CLINIC | Facility: CLINIC | Age: 82
End: 2022-05-17
Payer: MEDICARE

## 2022-05-17 DIAGNOSIS — M21.619 BUNION: ICD-10-CM

## 2022-05-17 DIAGNOSIS — B35.1 ONYCHOMYCOSIS: Primary | ICD-10-CM

## 2022-05-17 PROCEDURE — 99202 OFFICE O/P NEW SF 15 MIN: CPT | Performed by: PODIATRIST

## 2022-06-13 DIAGNOSIS — I10 ESSENTIAL HYPERTENSION: Chronic | ICD-10-CM

## 2022-06-14 RX ORDER — AMLODIPINE BESYLATE 5 MG/1
TABLET ORAL
Qty: 180 TABLET | Refills: 0 | Status: SHIPPED | OUTPATIENT
Start: 2022-06-14

## 2022-06-14 NOTE — TELEPHONE ENCOUNTER
Protocol passed     Requesting: amlodipine 5mg     LOV: 4/28/22   RTC: 5 months   Filled: 12/21/21 #180 0 refills   Recent Labs: 12/9/21     Upcoming OV: none scheduled

## 2022-07-05 ENCOUNTER — TELEPHONE (OUTPATIENT)
Dept: INTERNAL MEDICINE CLINIC | Facility: CLINIC | Age: 82
End: 2022-07-05

## 2022-07-05 DIAGNOSIS — I10 PRIMARY HYPERTENSION: Primary | ICD-10-CM

## 2022-07-05 NOTE — TELEPHONE ENCOUNTER
[phone] spoke to pt, confirmed that she speaks to [facility] about once every 3 months and she asks if pt needs anything. Pt did ask for a new BP cuff because hers is old and not sure if accurate anymore. Pt also stated that she received her bedside commode already and wanted to thank Dr. Derrell Diaz for that.      DME filled out for BP cuff and is in open status:    When authorized, will need to be faxed to:    [address] [place] [address]     Ph: [phone]  Fx: [phone]

## 2022-07-05 NOTE — TELEPHONE ENCOUNTER
Brandon with [facility] called requesting referral for the pt, she states pt is in need of a BP cuff that will be provided through [email or web address]    [email or web address]:  Fx: [phone]  Ph: [phone]

## 2022-07-22 NOTE — TELEPHONE ENCOUNTER
Pt's DME referral for a new BP cuff has been authorized. Referral documents faxed to [address] [place] [address] at   Fax #[phone] and received confirmation page.

## 2022-07-25 DIAGNOSIS — E78.5 HYPERLIPIDEMIA, UNSPECIFIED HYPERLIPIDEMIA TYPE: Chronic | ICD-10-CM

## 2022-07-25 DIAGNOSIS — I10 ESSENTIAL HYPERTENSION: Chronic | ICD-10-CM

## 2022-07-26 RX ORDER — METOPROLOL SUCCINATE 100 MG/1
TABLET, EXTENDED RELEASE ORAL
Qty: 90 TABLET | Refills: 1 | Status: SHIPPED | OUTPATIENT
Start: 2022-07-26

## 2022-07-26 RX ORDER — ATORVASTATIN CALCIUM 10 MG/1
TABLET, FILM COATED ORAL
Qty: 90 TABLET | Refills: 0 | Status: SHIPPED | OUTPATIENT
Start: 2022-07-26

## 2022-08-04 DIAGNOSIS — M25.561 RIGHT KNEE PAIN, UNSPECIFIED CHRONICITY: Primary | ICD-10-CM

## 2022-08-05 ENCOUNTER — OFFICE VISIT (OUTPATIENT)
Dept: ORTHOPEDICS CLINIC | Facility: CLINIC | Age: 82
End: 2022-08-05
Payer: MEDICARE

## 2022-08-05 ENCOUNTER — HOSPITAL ENCOUNTER (OUTPATIENT)
Dept: GENERAL RADIOLOGY | Age: 82
Discharge: HOME | End: 2022-08-05
Attending: PHYSICIAN ASSISTANT
Payer: MEDICARE

## 2022-08-05 VITALS — HEIGHT: 61.5 IN | HEART RATE: 51 BPM | BODY MASS INDEX: 24.98 KG/M2 | OXYGEN SATURATION: 98 % | WEIGHT: 134 LBS

## 2022-08-05 DIAGNOSIS — M17.11 PRIMARY OSTEOARTHRITIS OF RIGHT KNEE: Primary | ICD-10-CM

## 2022-08-05 DIAGNOSIS — M25.561 RIGHT KNEE PAIN, UNSPECIFIED CHRONICITY: ICD-10-CM

## 2022-08-05 PROCEDURE — 73564 X-RAY EXAM KNEE 4 OR MORE: CPT | Performed by: PHYSICIAN ASSISTANT

## 2022-08-05 RX ORDER — TRIAMCINOLONE ACETONIDE 40 MG/ML
40 INJECTION, SUSPENSION INTRA-ARTICULAR; INTRAMUSCULAR ONCE
Status: COMPLETED | OUTPATIENT
Start: 2022-08-05 | End: 2022-08-05

## 2022-08-05 RX ORDER — MELOXICAM 7.5 MG/1
7.5 TABLET ORAL DAILY
Qty: 90 TABLET | Refills: 0 | Status: SHIPPED | OUTPATIENT
Start: 2022-08-05

## 2022-08-05 RX ADMIN — TRIAMCINOLONE ACETONIDE 40 MG: 40 INJECTION, SUSPENSION INTRA-ARTICULAR; INTRAMUSCULAR at 11:18:00

## 2022-08-05 NOTE — PROCEDURES
After informed consent, the patient's right knee was marked, locally anesthetized with skin refrigerant, prepped with topical antiseptic, and injected with a mixture of 1mL 40mg/mL Kenalog, 2mL 1% lidocaine and 2mL 0.5% marcaine through the inferolateral portal.  A band-aid was applied. The patient tolerated the procedure well.     Zhen Collins PA-C  [address] [facility] Orthopedic Surgery

## 2022-08-05 NOTE — PROGRESS NOTES
[facility] Progress Note    Subjective: Patient returns to clinic after previously being seen in 2020 for right knee pain. At that time, she was given a cortisone injection which subsequently improved her symptoms for a long time. She returns to clinic because her right knee is again giving her pain along the anterior medial aspect. This worsens with prolonged standing and walking. She takes meloxicam as needed for pain which does help. She is currently out of the medication with and is interested in a refill. She also takes turmeric, collagen, and glucosamine/chondroitin which does help. She is otherwise doing fine and is not interested in surgical intervention at this point in time. Objective: Upon inspection, there is no effusion, swelling, redness, ecchymosis, laceration, or deformity of the knee. There is no significant tenderness to palpation along the medial or lateral joint lines. No tenderness along the medial or lateral patellar facets. Patient can actively extend her knee to 0 degrees and flex to at least 130 degrees, with pain at terminal flexion. Imaging: Radiographs of the right knee obtained today personally viewed, independently interpreted and radiology report read. Radiographs demonstrate similar appearance of her right knee in comparison to radiographs obtained in 2020. She still has a mild to moderate amount of joint space narrowing along the medial compartment with tricompartmental osteophytes. Assessment/Plan: 21-year-old female with symptomatic right knee osteoarthritis. She reports that she is interested in obtaining a cortisone injection at today's visit, and a cortisone injection was administered, please see separate procedure note for additional details. A refill of meloxicam was sent to the patient's pharmacy. I encouraged her to take the meloxicam as needed for her symptoms with food. Physical therapy referral was also written at today's visit.  All questions were invited and answered. She is happy with the plan and will follow as advised. Zhen Collins PA-C  [facility] Orthopedic Surgery    This note was dictated using Dragon software. While it was briefly proofread prior to completion, some grammatical, spelling, and word choice errors due to dictation may still occur.

## 2022-08-18 ENCOUNTER — TELEMEDICINE (OUTPATIENT)
Dept: INTERNAL MEDICINE CLINIC | Facility: CLINIC | Age: 82
End: 2022-08-18
Payer: MEDICARE

## 2022-08-18 DIAGNOSIS — U07.1 COVID-19: Primary | ICD-10-CM

## 2022-08-18 DIAGNOSIS — R05.1 ACUTE COUGH: ICD-10-CM

## 2022-08-18 PROCEDURE — 99213 OFFICE O/P EST LOW 20 MIN: CPT | Performed by: INTERNAL MEDICINE

## 2022-08-18 RX ORDER — BENZONATATE 200 MG/1
200 CAPSULE ORAL 3 TIMES DAILY PRN
Qty: 30 CAPSULE | Refills: 0 | Status: SHIPPED | OUTPATIENT
Start: 2022-08-18 | End: 2022-08-28

## 2022-08-18 NOTE — PROGRESS NOTES
Virtual Telephone Check-In    Jimmy Stephens verbally consents to a Virtual/Telephone Check-In visit on 08/18/22. Patient has been referred to the [facility] website at [email or web address] to review the yearly Consent to Treat document. Patient understands and accepts financial responsibility for any deductible, co-insurance and/or co-pays associated with this service. Duration of the service: 13 minutes      Summary of topics discussed: Sore throat yesterday took a COVID test which is positive currently only has a dry cough no sore throat no fever no shortness of breath saturations are 98% no wheezing no other ENT complaints. Patient advised to stay in isolation for total of 4 additional days after which she can go out with masking. Patient is fully vaccinated as well as boosted. He has no other symptoms. Patient advised to get in touch with us if there is any further worsening of his symptoms and signs or go to the emergency room. Patient was given the option of Paxlovid however that was deferred      Diagnoses and all orders for this visit:    COVID-19    Acute cough  -     benzonatate 200 MG Oral Cap; Take 1 capsule (200 mg total) by mouth 3 (three) times daily as needed for cough.           Griselda Alstrom, MD

## 2022-09-06 ENCOUNTER — TELEPHONE (OUTPATIENT)
Dept: INTERNAL MEDICINE CLINIC | Facility: CLINIC | Age: 82
End: 2022-09-06

## 2022-09-06 DIAGNOSIS — Z00.00 LABORATORY EXAMINATION ORDERED AS PART OF A ROUTINE GENERAL MEDICAL EXAMINATION: Primary | ICD-10-CM

## 2022-09-06 NOTE — TELEPHONE ENCOUNTER
Pt requesting lab orders prior to AWV    Future Appointments   Date Time Provider Theodora Melara   9/12/2022 12:00 PM Handy Dumas MD [facility] [place]

## 2022-09-06 NOTE — TELEPHONE ENCOUNTER
Pt needs referral for OV 8/5 for Dr. Jigna Isaac   She received a cortisone shot and it was not covered until referral has been received. Medicare is awaiting a referral to be faxed to them per pt.    Fax- [phone]

## 2022-09-07 ENCOUNTER — LAB ENCOUNTER (OUTPATIENT)
Dept: LAB | Age: 82
End: 2022-09-07
Attending: INTERNAL MEDICINE
Payer: MEDICARE

## 2022-09-07 DIAGNOSIS — Z00.00 LABORATORY EXAMINATION ORDERED AS PART OF A ROUTINE GENERAL MEDICAL EXAMINATION: ICD-10-CM

## 2022-09-07 LAB
ALBUMIN SERPL-MCNC: 3.7 G/DL (ref 3.4–5)
ALBUMIN/GLOB SERPL: 1 {RATIO} (ref 1–2)
ALP LIVER SERPL-CCNC: 57 U/L
ALT SERPL-CCNC: 25 U/L
ANION GAP SERPL CALC-SCNC: 4 MMOL/L (ref 0–18)
AST SERPL-CCNC: 22 U/L (ref 15–37)
BASOPHILS # BLD AUTO: 0.05 X10(3) UL (ref 0–0.2)
BASOPHILS NFR BLD AUTO: 0.8 %
BILIRUB SERPL-MCNC: 0.8 MG/DL (ref 0.1–2)
BILIRUB UR QL STRIP.AUTO: NEGATIVE
BUN BLD-MCNC: 13 MG/DL (ref 7–18)
CALCIUM BLD-MCNC: 8.9 MG/DL (ref 8.5–10.1)
CHLORIDE SERPL-SCNC: 104 MMOL/L (ref 98–112)
CHOLEST SERPL-MCNC: 132 MG/DL (ref ?–200)
CLARITY UR REFRACT.AUTO: CLEAR
CO2 SERPL-SCNC: 29 MMOL/L (ref 21–32)
COLOR UR AUTO: YELLOW
CREAT BLD-MCNC: 0.87 MG/DL
EOSINOPHIL # BLD AUTO: 0.11 X10(3) UL (ref 0–0.7)
EOSINOPHIL NFR BLD AUTO: 1.9 %
ERYTHROCYTE [DISTWIDTH] IN BLOOD BY AUTOMATED COUNT: 12.9 %
EST. AVERAGE GLUCOSE BLD GHB EST-MCNC: 134 MG/DL (ref 68–126)
FASTING PATIENT LIPID ANSWER: YES
FASTING STATUS PATIENT QL REPORTED: YES
GFR SERPLBLD BASED ON 1.73 SQ M-ARVRAT: 67 ML/MIN/1.73M2 (ref 60–?)
GLOBULIN PLAS-MCNC: 3.7 G/DL (ref 2.8–4.4)
GLUCOSE BLD-MCNC: 116 MG/DL (ref 70–99)
GLUCOSE UR STRIP.AUTO-MCNC: NEGATIVE MG/DL
HBA1C MFR BLD: 6.3 % (ref ?–5.7)
HCT VFR BLD AUTO: 41.9 %
HDLC SERPL-MCNC: 69 MG/DL (ref 40–59)
HGB BLD-MCNC: 13.3 G/DL
IMM GRANULOCYTES # BLD AUTO: 0.01 X10(3) UL (ref 0–1)
IMM GRANULOCYTES NFR BLD: 0.2 %
KETONES UR STRIP.AUTO-MCNC: NEGATIVE MG/DL
LDLC SERPL CALC-MCNC: 45 MG/DL (ref ?–100)
LEUKOCYTE ESTERASE UR QL STRIP.AUTO: NEGATIVE
LYMPHOCYTES # BLD AUTO: 1.97 X10(3) UL (ref 1–4)
LYMPHOCYTES NFR BLD AUTO: 33.2 %
MCH RBC QN AUTO: 32.8 PG (ref 26–34)
MCHC RBC AUTO-ENTMCNC: 31.7 G/DL (ref 31–37)
MCV RBC AUTO: 103.5 FL
MONOCYTES # BLD AUTO: 0.59 X10(3) UL (ref 0.1–1)
MONOCYTES NFR BLD AUTO: 9.9 %
NEUTROPHILS # BLD AUTO: 3.2 X10 (3) UL (ref 1.5–7.7)
NEUTROPHILS # BLD AUTO: 3.2 X10(3) UL (ref 1.5–7.7)
NEUTROPHILS NFR BLD AUTO: 54 %
NITRITE UR QL STRIP.AUTO: NEGATIVE
NONHDLC SERPL-MCNC: 63 MG/DL (ref ?–130)
OSMOLALITY SERPL CALC.SUM OF ELEC: 285 MOSM/KG (ref 275–295)
PH UR STRIP.AUTO: 7 [PH] (ref 5–8)
PLATELET # BLD AUTO: 194 10(3)UL (ref 150–450)
POTASSIUM SERPL-SCNC: 4 MMOL/L (ref 3.5–5.1)
PROT SERPL-MCNC: 7.4 G/DL (ref 6.4–8.2)
PROT UR STRIP.AUTO-MCNC: NEGATIVE MG/DL
RBC # BLD AUTO: 4.05 X10(6)UL
RBC UR QL AUTO: NEGATIVE
SODIUM SERPL-SCNC: 137 MMOL/L (ref 136–145)
SP GR UR STRIP.AUTO: 1.01 (ref 1–1.03)
T4 SERPL-MCNC: 8.9 UG/DL
TRIGL SERPL-MCNC: 100 MG/DL (ref 30–149)
TSI SER-ACNC: 23.5 MIU/ML (ref 0.36–3.74)
UROBILINOGEN UR STRIP.AUTO-MCNC: 0.2 MG/DL
VLDLC SERPL CALC-MCNC: 14 MG/DL (ref 0–30)
WBC # BLD AUTO: 5.9 X10(3) UL (ref 4–11)

## 2022-09-07 PROCEDURE — 80053 COMPREHEN METABOLIC PANEL: CPT

## 2022-09-07 PROCEDURE — 80061 LIPID PANEL: CPT

## 2022-09-07 PROCEDURE — 85025 COMPLETE CBC W/AUTO DIFF WBC: CPT

## 2022-09-07 PROCEDURE — 81003 URINALYSIS AUTO W/O SCOPE: CPT

## 2022-09-07 PROCEDURE — 84443 ASSAY THYROID STIM HORMONE: CPT

## 2022-09-07 PROCEDURE — 83036 HEMOGLOBIN GLYCOSYLATED A1C: CPT

## 2022-09-07 PROCEDURE — 36415 COLL VENOUS BLD VENIPUNCTURE: CPT

## 2022-09-07 PROCEDURE — 84436 ASSAY OF TOTAL THYROXINE: CPT

## 2022-09-07 NOTE — TELEPHONE ENCOUNTER
[phone] spoke to pt, explained to her that we are unable to back date a referral to 8/5/22. Pt v/u and stated she did not know that and does not know how she will pay this bill. Encouraged pt to call the doctor's office that gave the injection to determine what her options are.

## 2022-09-12 ENCOUNTER — OFFICE VISIT (OUTPATIENT)
Dept: INTERNAL MEDICINE CLINIC | Facility: CLINIC | Age: 82
End: 2022-09-12
Payer: MEDICARE

## 2022-09-12 VITALS
WEIGHT: 128.5 LBS | BODY MASS INDEX: 23.95 KG/M2 | HEART RATE: 51 BPM | DIASTOLIC BLOOD PRESSURE: 78 MMHG | RESPIRATION RATE: 16 BRPM | TEMPERATURE: 98 F | HEIGHT: 61.5 IN | OXYGEN SATURATION: 100 % | SYSTOLIC BLOOD PRESSURE: 130 MMHG

## 2022-09-12 DIAGNOSIS — I10 PRIMARY HYPERTENSION: Chronic | ICD-10-CM

## 2022-09-12 DIAGNOSIS — R73.03 PREDIABETES: Chronic | ICD-10-CM

## 2022-09-12 DIAGNOSIS — E03.8 OTHER SPECIFIED HYPOTHYROIDISM: Chronic | ICD-10-CM

## 2022-09-12 DIAGNOSIS — Z00.00 ROUTINE GENERAL MEDICAL EXAMINATION AT A HEALTH CARE FACILITY: Primary | ICD-10-CM

## 2022-09-12 DIAGNOSIS — E55.9 VITAMIN D DEFICIENCY: Chronic | ICD-10-CM

## 2022-09-12 DIAGNOSIS — E78.5 HYPERLIPIDEMIA, UNSPECIFIED HYPERLIPIDEMIA TYPE: Chronic | ICD-10-CM

## 2022-09-12 PROCEDURE — G0439 PPPS, SUBSEQ VISIT: HCPCS | Performed by: INTERNAL MEDICINE

## 2022-09-12 RX ORDER — LEVOTHYROXINE SODIUM 0.07 MG/1
75 TABLET ORAL AS DIRECTED
Qty: 90 TABLET | Refills: 3 | Status: SHIPPED | OUTPATIENT
Start: 2022-09-12 | End: 2022-12-11

## 2022-09-13 DIAGNOSIS — I10 ESSENTIAL HYPERTENSION: Chronic | ICD-10-CM

## 2022-09-17 RX ORDER — AMLODIPINE BESYLATE 5 MG/1
TABLET ORAL
Qty: 180 TABLET | Refills: 0 | Status: SHIPPED | OUTPATIENT
Start: 2022-09-17

## 2022-10-24 ENCOUNTER — LAB ENCOUNTER (OUTPATIENT)
Dept: LAB | Age: 82
End: 2022-10-24
Attending: INTERNAL MEDICINE
Payer: MEDICARE

## 2022-10-24 DIAGNOSIS — E03.8 OTHER SPECIFIED HYPOTHYROIDISM: Chronic | ICD-10-CM

## 2022-10-24 LAB
T4 FREE SERPL-MCNC: 1.5 NG/DL (ref 0.8–1.7)
TSI SER-ACNC: 1.75 MIU/ML (ref 0.36–3.74)

## 2022-10-24 PROCEDURE — 36415 COLL VENOUS BLD VENIPUNCTURE: CPT

## 2022-10-24 PROCEDURE — 84443 ASSAY THYROID STIM HORMONE: CPT

## 2022-10-24 PROCEDURE — 84439 ASSAY OF FREE THYROXINE: CPT

## 2022-10-31 DIAGNOSIS — E78.5 HYPERLIPIDEMIA, UNSPECIFIED HYPERLIPIDEMIA TYPE: Chronic | ICD-10-CM

## 2022-10-31 RX ORDER — ATORVASTATIN CALCIUM 10 MG/1
10 TABLET, FILM COATED ORAL DAILY
Qty: 90 TABLET | Refills: 0 | Status: SHIPPED | OUTPATIENT
Start: 2022-10-31

## 2022-11-17 DIAGNOSIS — M17.11 PRIMARY OSTEOARTHRITIS OF RIGHT KNEE: ICD-10-CM

## 2022-11-17 RX ORDER — MELOXICAM 7.5 MG/1
TABLET ORAL
Qty: 90 TABLET | Refills: 0 | Status: SHIPPED | OUTPATIENT
Start: 2022-11-17

## 2022-11-17 NOTE — TELEPHONE ENCOUNTER
DOS: none  Last OV: 8/5/2022   Last refill date: 8/5/22    #/refills: 90/0  Upcoming appt: No future appointments.     RX pended for review and approval

## 2022-12-14 DIAGNOSIS — I10 ESSENTIAL HYPERTENSION: Chronic | ICD-10-CM

## 2022-12-14 RX ORDER — AMLODIPINE BESYLATE 5 MG/1
TABLET ORAL
Qty: 180 TABLET | Refills: 0 | Status: SHIPPED | OUTPATIENT
Start: 2022-12-14

## 2023-01-04 DIAGNOSIS — E03.8 OTHER SPECIFIED HYPOTHYROIDISM: Chronic | ICD-10-CM

## 2023-01-04 RX ORDER — LEVOTHYROXINE SODIUM 0.07 MG/1
75 TABLET ORAL AS DIRECTED
Qty: 90 TABLET | Refills: 0 | Status: SHIPPED | OUTPATIENT
Start: 2023-01-04 | End: 2023-04-04

## 2023-01-17 ENCOUNTER — TELEPHONE (OUTPATIENT)
Dept: INTERNAL MEDICINE CLINIC | Facility: CLINIC | Age: 83
End: 2023-01-17

## 2023-01-17 NOTE — TELEPHONE ENCOUNTER
VM- Please advise;  Patient asking if she can get the pneumonia vaccine    Patient had:   -Prevnar 13 (9/22/17)  -Pneumovax 23 (8/1/12)     Please confirm if you would recommend patient get any additional pneumonia shots. TY! [facility] 8 Front-Desk:   -please cancel patients nurse visit for 1/23, has Medicare and needs to get shingrix vaccine at local pharmacy.

## 2023-01-17 NOTE — TELEPHONE ENCOUNTER
Will await Dr. Jose Garvin response first. If pt can get pneumonia shot then ok to keep NV just not administer shingrix.

## 2023-01-17 NOTE — TELEPHONE ENCOUNTER
Pt would like to know if she can get the pneumonia vaccine. Would like to know if she is due. Also scheduled for shingrix. Please place both orders if appropriate.     Please advise     Future Appointments   Date Time Provider Theodora Melara   1/23/2023 11:00 AM [facility] 08 NURSE [facility] 8 [facility] [place]

## 2023-01-21 DIAGNOSIS — I10 ESSENTIAL HYPERTENSION: Chronic | ICD-10-CM

## 2023-01-21 DIAGNOSIS — E78.5 HYPERLIPIDEMIA, UNSPECIFIED HYPERLIPIDEMIA TYPE: Chronic | ICD-10-CM

## 2023-01-23 RX ORDER — METOPROLOL SUCCINATE 100 MG/1
TABLET, EXTENDED RELEASE ORAL
Qty: 90 TABLET | Refills: 0 | Status: SHIPPED | OUTPATIENT
Start: 2023-01-23

## 2023-01-23 RX ORDER — ATORVASTATIN CALCIUM 10 MG/1
TABLET, FILM COATED ORAL
Qty: 90 TABLET | Refills: 0 | Status: SHIPPED | OUTPATIENT
Start: 2023-01-23

## 2023-02-03 ENCOUNTER — OFFICE VISIT (OUTPATIENT)
Dept: ORTHOPEDICS CLINIC | Facility: CLINIC | Age: 83
End: 2023-02-03
Payer: MEDICARE

## 2023-02-03 VITALS — OXYGEN SATURATION: 98 % | WEIGHT: 128.5 LBS | HEART RATE: 52 BPM | HEIGHT: 61.5 IN | BODY MASS INDEX: 23.95 KG/M2

## 2023-02-03 DIAGNOSIS — M17.11 PRIMARY OSTEOARTHRITIS OF RIGHT KNEE: Primary | ICD-10-CM

## 2023-02-03 PROCEDURE — 99213 OFFICE O/P EST LOW 20 MIN: CPT | Performed by: PHYSICIAN ASSISTANT

## 2023-02-03 PROCEDURE — 20610 DRAIN/INJ JOINT/BURSA W/O US: CPT | Performed by: PHYSICIAN ASSISTANT

## 2023-02-03 RX ORDER — TRIAMCINOLONE ACETONIDE 40 MG/ML
40 INJECTION, SUSPENSION INTRA-ARTICULAR; INTRAMUSCULAR ONCE
Status: COMPLETED | OUTPATIENT
Start: 2023-02-03 | End: 2023-02-03

## 2023-02-03 RX ADMIN — TRIAMCINOLONE ACETONIDE 40 MG: 40 INJECTION, SUSPENSION INTRA-ARTICULAR; INTRAMUSCULAR at 08:06:00

## 2023-02-03 NOTE — PROGRESS NOTES
[facility] Progress Note    Subjective: Patient returns to clinic after last being seen on 8/5/22. She has been doing well and reports about 1 month of noticeable improvement after her last cortisone injection. She is only taking meloxicam as needed at this point. She feels that she is functionally doing well and just yesterday ascended 3 flights of stairs. She will be travelling to [facility] for 1.5 months next week and would like a repeat cortisone injection. Objective: Patient seated comfortably in exam chair. Alert and oriented x3. Nonlabored breathing. Grossly neurologically intact. Able to ambulate independently. Assessment/Plan: I administered a cortisone injection into the right knee in clinic today, please see separate procedure note for additional details. I explained to the patient that the cortisone may take 2-3 days to take effect. I explained that the injection could be repeated at least every 3 months as long as it continues to provide relief of symptoms. The patient may contact our office if they are interested in repeating the injection, and we can schedule them for a procedure-only appointment. Continue with Meloxicam as needed. No refill needed today. Larry Dee PA-C  [facility] Orthopedic Surgery    This note was dictated using Dragon software. While it was briefly proofread prior to completion, some grammatical, spelling, and word choice errors due to dictation may still occur.

## 2023-02-03 NOTE — PROCEDURES
After informed consent, the patient's right knee was marked, locally anesthetized with skin refrigerant, prepped with topical antiseptic, and injected with a mixture of 1mL 40mg/mL Kenalog, 2mL 1% lidocaine and 2mL 0.5% marcaine through the inferolateral portal.  A band-aid was applied. The patient tolerated the procedure well.     Charity Castillo PA-C  [address] Orthopedic Surgery

## 2023-03-16 DIAGNOSIS — I10 ESSENTIAL HYPERTENSION: Chronic | ICD-10-CM

## 2023-03-17 RX ORDER — AMLODIPINE BESYLATE 5 MG/1
TABLET ORAL
Qty: 180 TABLET | Refills: 0 | Status: SHIPPED | OUTPATIENT
Start: 2023-03-17

## 2023-04-17 DIAGNOSIS — I10 ESSENTIAL HYPERTENSION: Chronic | ICD-10-CM

## 2023-04-18 RX ORDER — METOPROLOL SUCCINATE 100 MG/1
TABLET, EXTENDED RELEASE ORAL
Qty: 90 TABLET | Refills: 0 | Status: SHIPPED | OUTPATIENT
Start: 2023-04-18

## 2023-04-18 NOTE — TELEPHONE ENCOUNTER
Protocol failed     Requesting: metoprolol succinate ER 100mg     LOV: 9/12/22   RTC: no follow ups on file   Filled:1/23/23 # 90 0 refills   Recent Labs: 9/7/22     Upcoming OV: none scheduled

## 2023-04-21 ENCOUNTER — TELEPHONE (OUTPATIENT)
Dept: INTERNAL MEDICINE CLINIC | Facility: CLINIC | Age: 83
End: 2023-04-21

## 2023-04-21 DIAGNOSIS — Z01.89 ROUTINE LAB DRAW: Primary | ICD-10-CM

## 2023-04-21 DIAGNOSIS — E78.5 HYPERLIPIDEMIA, UNSPECIFIED HYPERLIPIDEMIA TYPE: Chronic | ICD-10-CM

## 2023-04-21 DIAGNOSIS — E03.8 OTHER SPECIFIED HYPOTHYROIDISM: ICD-10-CM

## 2023-04-21 DIAGNOSIS — R73.03 PREDIABETES: Chronic | ICD-10-CM

## 2023-04-21 NOTE — TELEPHONE ENCOUNTER
Patient is requesting lab orders to be placed in to have done before AWV.     Future Appointments   Date Time Provider Theodora Melara   5/1/2023  1:00 PM Margy Griggs MD [facility] [place]

## 2023-04-24 RX ORDER — ATORVASTATIN CALCIUM 10 MG/1
TABLET, FILM COATED ORAL
Qty: 90 TABLET | Refills: 0 | Status: SHIPPED | OUTPATIENT
Start: 2023-04-24

## 2023-04-24 NOTE — TELEPHONE ENCOUNTER
Protocol passed     Requesting: atorvastatin 10mg     LOV:9/12/22   RTC: no follow ups on file   Filled: 1/23/23 # 90 0 refills   Recent Labs: 9/7/22     Upcoming OV: 5/1/23

## 2023-04-26 ENCOUNTER — LAB ENCOUNTER (OUTPATIENT)
Dept: LAB | Age: 83
End: 2023-04-26
Attending: INTERNAL MEDICINE
Payer: MEDICARE

## 2023-04-26 DIAGNOSIS — Z01.89 ROUTINE LAB DRAW: ICD-10-CM

## 2023-04-26 DIAGNOSIS — R73.03 PREDIABETES: ICD-10-CM

## 2023-04-26 DIAGNOSIS — E03.8 OTHER SPECIFIED HYPOTHYROIDISM: ICD-10-CM

## 2023-04-26 LAB
ALBUMIN SERPL-MCNC: 3.6 G/DL (ref 3.4–5)
ALBUMIN/GLOB SERPL: 1 {RATIO} (ref 1–2)
ALP LIVER SERPL-CCNC: 55 U/L
ALT SERPL-CCNC: 30 U/L
ANION GAP SERPL CALC-SCNC: 1 MMOL/L (ref 0–18)
AST SERPL-CCNC: 22 U/L (ref 15–37)
BASOPHILS # BLD AUTO: 0.07 X10(3) UL (ref 0–0.2)
BASOPHILS NFR BLD AUTO: 1.2 %
BILIRUB SERPL-MCNC: 0.7 MG/DL (ref 0.1–2)
BUN BLD-MCNC: 16 MG/DL (ref 7–18)
CALCIUM BLD-MCNC: 9.1 MG/DL (ref 8.5–10.1)
CHLORIDE SERPL-SCNC: 108 MMOL/L (ref 98–112)
CHOLEST SERPL-MCNC: 122 MG/DL (ref ?–200)
CO2 SERPL-SCNC: 29 MMOL/L (ref 21–32)
CREAT BLD-MCNC: 0.8 MG/DL
EOSINOPHIL # BLD AUTO: 0.27 X10(3) UL (ref 0–0.7)
EOSINOPHIL NFR BLD AUTO: 4.5 %
ERYTHROCYTE [DISTWIDTH] IN BLOOD BY AUTOMATED COUNT: 11.9 %
EST. AVERAGE GLUCOSE BLD GHB EST-MCNC: 128 MG/DL (ref 68–126)
FASTING PATIENT LIPID ANSWER: YES
FASTING STATUS PATIENT QL REPORTED: YES
GFR SERPLBLD BASED ON 1.73 SQ M-ARVRAT: 74 ML/MIN/1.73M2 (ref 60–?)
GLOBULIN PLAS-MCNC: 3.6 G/DL (ref 2.8–4.4)
GLUCOSE BLD-MCNC: 119 MG/DL (ref 70–99)
HBA1C MFR BLD: 6.1 % (ref ?–5.7)
HCT VFR BLD AUTO: 40.1 %
HDLC SERPL-MCNC: 61 MG/DL (ref 40–59)
HGB BLD-MCNC: 13.1 G/DL
IMM GRANULOCYTES # BLD AUTO: 0.01 X10(3) UL (ref 0–1)
IMM GRANULOCYTES NFR BLD: 0.2 %
LDLC SERPL CALC-MCNC: 49 MG/DL (ref ?–100)
LYMPHOCYTES # BLD AUTO: 1.79 X10(3) UL (ref 1–4)
LYMPHOCYTES NFR BLD AUTO: 29.8 %
MCH RBC QN AUTO: 32.8 PG (ref 26–34)
MCHC RBC AUTO-ENTMCNC: 32.7 G/DL (ref 31–37)
MCV RBC AUTO: 100.3 FL
MONOCYTES # BLD AUTO: 0.72 X10(3) UL (ref 0.1–1)
MONOCYTES NFR BLD AUTO: 12 %
NEUTROPHILS # BLD AUTO: 3.15 X10 (3) UL (ref 1.5–7.7)
NEUTROPHILS # BLD AUTO: 3.15 X10(3) UL (ref 1.5–7.7)
NEUTROPHILS NFR BLD AUTO: 52.3 %
NONHDLC SERPL-MCNC: 61 MG/DL (ref ?–130)
OSMOLALITY SERPL CALC.SUM OF ELEC: 288 MOSM/KG (ref 275–295)
PLATELET # BLD AUTO: 218 10(3)UL (ref 150–450)
POTASSIUM SERPL-SCNC: 4.5 MMOL/L (ref 3.5–5.1)
PROT SERPL-MCNC: 7.2 G/DL (ref 6.4–8.2)
RBC # BLD AUTO: 4 X10(6)UL
SODIUM SERPL-SCNC: 138 MMOL/L (ref 136–145)
T4 FREE SERPL-MCNC: 1.6 NG/DL (ref 0.8–1.7)
TRIGL SERPL-MCNC: 55 MG/DL (ref 30–149)
TSI SER-ACNC: 0.05 MIU/ML (ref 0.36–3.74)
VIT D+METAB SERPL-MCNC: 47.7 NG/ML (ref 30–100)
VLDLC SERPL CALC-MCNC: 8 MG/DL (ref 0–30)
WBC # BLD AUTO: 6 X10(3) UL (ref 4–11)

## 2023-04-26 PROCEDURE — 83036 HEMOGLOBIN GLYCOSYLATED A1C: CPT

## 2023-04-26 PROCEDURE — 80053 COMPREHEN METABOLIC PANEL: CPT

## 2023-04-26 PROCEDURE — 80061 LIPID PANEL: CPT

## 2023-04-26 PROCEDURE — 84443 ASSAY THYROID STIM HORMONE: CPT

## 2023-04-26 PROCEDURE — 84439 ASSAY OF FREE THYROXINE: CPT

## 2023-04-26 PROCEDURE — 85025 COMPLETE CBC W/AUTO DIFF WBC: CPT

## 2023-04-26 PROCEDURE — 82306 VITAMIN D 25 HYDROXY: CPT

## 2023-04-26 PROCEDURE — 36415 COLL VENOUS BLD VENIPUNCTURE: CPT

## 2023-05-01 ENCOUNTER — OFFICE VISIT (OUTPATIENT)
Dept: INTERNAL MEDICINE CLINIC | Facility: CLINIC | Age: 83
End: 2023-05-01
Payer: MEDICARE

## 2023-05-01 VITALS
RESPIRATION RATE: 15 BRPM | DIASTOLIC BLOOD PRESSURE: 80 MMHG | OXYGEN SATURATION: 99 % | BODY MASS INDEX: 24.29 KG/M2 | HEIGHT: 61.2 IN | WEIGHT: 128.63 LBS | TEMPERATURE: 98 F | SYSTOLIC BLOOD PRESSURE: 140 MMHG | HEART RATE: 61 BPM

## 2023-05-01 DIAGNOSIS — M22.40 CHONDROMALACIA OF PATELLA, UNSPECIFIED LATERALITY: Chronic | ICD-10-CM

## 2023-05-01 DIAGNOSIS — E03.8 OTHER SPECIFIED HYPOTHYROIDISM: Chronic | ICD-10-CM

## 2023-05-01 DIAGNOSIS — R73.03 PREDIABETES: Chronic | ICD-10-CM

## 2023-05-01 DIAGNOSIS — E78.5 HYPERLIPIDEMIA, UNSPECIFIED HYPERLIPIDEMIA TYPE: Chronic | ICD-10-CM

## 2023-05-01 DIAGNOSIS — Z00.00 ENCOUNTER FOR ANNUAL WELLNESS VISIT (AWV) IN MEDICARE PATIENT: Primary | ICD-10-CM

## 2023-05-01 DIAGNOSIS — I10 PRIMARY HYPERTENSION: Chronic | ICD-10-CM

## 2023-05-01 DIAGNOSIS — M17.11 PRIMARY OSTEOARTHRITIS OF RIGHT KNEE: Chronic | ICD-10-CM

## 2023-05-01 PROBLEM — R31.29 MICROSCOPIC HEMATURIA: Chronic | Status: RESOLVED | Noted: 2018-12-27 | Resolved: 2023-05-01

## 2023-05-01 RX ORDER — LEVOTHYROXINE SODIUM 0.05 MG/1
TABLET ORAL AS DIRECTED
Qty: 110 TABLET | Refills: 0 | Status: SHIPPED | OUTPATIENT
Start: 2023-05-01 | End: 2023-05-01

## 2023-05-01 RX ORDER — LEVOTHYROXINE SODIUM 0.05 MG/1
TABLET ORAL AS DIRECTED
Qty: 110 TABLET | Refills: 0 | Status: SHIPPED | OUTPATIENT
Start: 2023-05-01

## 2023-05-01 RX ORDER — LISINOPRIL 10 MG/1
10 TABLET ORAL DAILY
Qty: 90 TABLET | Refills: 0 | Status: SHIPPED | OUTPATIENT
Start: 2023-05-01

## 2023-06-15 ENCOUNTER — LAB ENCOUNTER (OUTPATIENT)
Dept: LAB | Age: 83
End: 2023-06-15
Attending: INTERNAL MEDICINE
Payer: MEDICARE

## 2023-06-15 DIAGNOSIS — E03.8 OTHER SPECIFIED HYPOTHYROIDISM: Chronic | ICD-10-CM

## 2023-06-15 LAB
T4 FREE SERPL-MCNC: 1.1 NG/DL (ref 0.8–1.7)
TSI SER-ACNC: 7.8 MIU/ML (ref 0.36–3.74)

## 2023-06-15 PROCEDURE — 84443 ASSAY THYROID STIM HORMONE: CPT

## 2023-06-15 PROCEDURE — 36415 COLL VENOUS BLD VENIPUNCTURE: CPT

## 2023-06-15 PROCEDURE — 84439 ASSAY OF FREE THYROXINE: CPT

## 2023-06-21 ENCOUNTER — OFFICE VISIT (OUTPATIENT)
Dept: INTERNAL MEDICINE CLINIC | Facility: CLINIC | Age: 83
End: 2023-06-21
Payer: MEDICARE

## 2023-06-21 VITALS
WEIGHT: 129 LBS | SYSTOLIC BLOOD PRESSURE: 128 MMHG | OXYGEN SATURATION: 97 % | TEMPERATURE: 98 F | DIASTOLIC BLOOD PRESSURE: 70 MMHG | RESPIRATION RATE: 14 BRPM | HEART RATE: 58 BPM | BODY MASS INDEX: 24 KG/M2

## 2023-06-21 DIAGNOSIS — R73.03 PREDIABETES: Chronic | ICD-10-CM

## 2023-06-21 DIAGNOSIS — I10 ESSENTIAL HYPERTENSION: Chronic | ICD-10-CM

## 2023-06-21 DIAGNOSIS — E03.8 OTHER SPECIFIED HYPOTHYROIDISM: Primary | Chronic | ICD-10-CM

## 2023-06-21 PROCEDURE — 99214 OFFICE O/P EST MOD 30 MIN: CPT | Performed by: INTERNAL MEDICINE

## 2023-06-21 RX ORDER — METOPROLOL SUCCINATE 100 MG/1
100 TABLET, EXTENDED RELEASE ORAL DAILY
Qty: 90 TABLET | Refills: 3 | Status: SHIPPED | OUTPATIENT
Start: 2023-06-21

## 2023-06-21 RX ORDER — LEVOTHYROXINE SODIUM 0.07 MG/1
75 TABLET ORAL
Qty: 90 TABLET | Refills: 1 | Status: SHIPPED | OUTPATIENT
Start: 2023-06-21

## 2023-07-20 DIAGNOSIS — E78.5 HYPERLIPIDEMIA, UNSPECIFIED HYPERLIPIDEMIA TYPE: Chronic | ICD-10-CM

## 2023-07-21 RX ORDER — ATORVASTATIN CALCIUM 10 MG/1
TABLET, FILM COATED ORAL
Qty: 90 TABLET | Refills: 0 | Status: SHIPPED | OUTPATIENT
Start: 2023-07-21

## 2023-07-21 NOTE — TELEPHONE ENCOUNTER
LOV: 5/1/23 with DV   RTC: 6 weeks   Filled: 4/24/23 # 90 0 refills   Labs: 4/26/23   No future appointments.

## 2023-07-28 ENCOUNTER — TELEPHONE (OUTPATIENT)
Dept: INTERNAL MEDICINE CLINIC | Facility: CLINIC | Age: 83
End: 2023-07-28

## 2023-07-28 DIAGNOSIS — I10 PRIMARY HYPERTENSION: Chronic | ICD-10-CM

## 2023-07-28 RX ORDER — LISINOPRIL 10 MG/1
10 TABLET ORAL DAILY
Qty: 90 TABLET | Refills: 0 | Status: SHIPPED | OUTPATIENT
Start: 2023-07-28

## 2023-07-28 NOTE — TELEPHONE ENCOUNTER
Pt called requesting a refill for her lisinopril 10 MG Oral Tab    Pt stated she is all out of this medication     [address], [address] AT [facility], [phone], [phone]

## 2023-09-20 ENCOUNTER — LAB ENCOUNTER (OUTPATIENT)
Dept: LAB | Age: 83
End: 2023-09-20
Attending: INTERNAL MEDICINE
Payer: MEDICARE

## 2023-09-20 DIAGNOSIS — E03.8 OTHER SPECIFIED HYPOTHYROIDISM: Chronic | ICD-10-CM

## 2023-09-20 DIAGNOSIS — E03.8 OTHER SPECIFIED HYPOTHYROIDISM: Primary | Chronic | ICD-10-CM

## 2023-09-20 LAB
T4 FREE SERPL-MCNC: 1.4 NG/DL (ref 0.8–1.7)
TSI SER-ACNC: 0.69 MIU/ML (ref 0.36–3.74)

## 2023-09-20 PROCEDURE — 84443 ASSAY THYROID STIM HORMONE: CPT

## 2023-09-20 PROCEDURE — 84439 ASSAY OF FREE THYROXINE: CPT

## 2023-09-20 PROCEDURE — 36415 COLL VENOUS BLD VENIPUNCTURE: CPT

## 2023-09-26 ENCOUNTER — OFFICE VISIT (OUTPATIENT)
Dept: INTERNAL MEDICINE CLINIC | Facility: CLINIC | Age: 83
End: 2023-09-26
Payer: MEDICARE

## 2023-09-26 ENCOUNTER — LAB ENCOUNTER (OUTPATIENT)
Dept: LAB | Age: 83
End: 2023-09-26
Attending: INTERNAL MEDICINE
Payer: MEDICARE

## 2023-09-26 VITALS
WEIGHT: 128.81 LBS | BODY MASS INDEX: 24 KG/M2 | RESPIRATION RATE: 15 BRPM | DIASTOLIC BLOOD PRESSURE: 62 MMHG | TEMPERATURE: 98 F | SYSTOLIC BLOOD PRESSURE: 108 MMHG | OXYGEN SATURATION: 98 % | HEART RATE: 75 BPM

## 2023-09-26 DIAGNOSIS — I10 PRIMARY HYPERTENSION: Primary | Chronic | ICD-10-CM

## 2023-09-26 DIAGNOSIS — B35.1 ONYCHOMYCOSIS: ICD-10-CM

## 2023-09-26 DIAGNOSIS — Z23 IMMUNIZATION DUE: ICD-10-CM

## 2023-09-26 DIAGNOSIS — E03.8 OTHER SPECIFIED HYPOTHYROIDISM: Chronic | ICD-10-CM

## 2023-09-26 DIAGNOSIS — R73.03 PREDIABETES: Chronic | ICD-10-CM

## 2023-09-26 LAB
EST. AVERAGE GLUCOSE BLD GHB EST-MCNC: 131 MG/DL (ref 68–126)
HBA1C MFR BLD: 6.2 % (ref ?–5.7)

## 2023-09-26 PROCEDURE — G0008 ADMIN INFLUENZA VIRUS VAC: HCPCS | Performed by: INTERNAL MEDICINE

## 2023-09-26 PROCEDURE — 90662 IIV NO PRSV INCREASED AG IM: CPT | Performed by: INTERNAL MEDICINE

## 2023-09-26 PROCEDURE — 83036 HEMOGLOBIN GLYCOSYLATED A1C: CPT

## 2023-09-26 PROCEDURE — 36415 COLL VENOUS BLD VENIPUNCTURE: CPT

## 2023-09-26 PROCEDURE — 99214 OFFICE O/P EST MOD 30 MIN: CPT | Performed by: INTERNAL MEDICINE

## 2023-10-18 DIAGNOSIS — I10 PRIMARY HYPERTENSION: Chronic | ICD-10-CM

## 2023-10-18 DIAGNOSIS — I10 ESSENTIAL HYPERTENSION: Chronic | ICD-10-CM

## 2023-10-18 DIAGNOSIS — E78.5 HYPERLIPIDEMIA, UNSPECIFIED HYPERLIPIDEMIA TYPE: Chronic | ICD-10-CM

## 2023-10-18 RX ORDER — AMLODIPINE BESYLATE 5 MG/1
TABLET ORAL
Qty: 180 TABLET | Refills: 0 | OUTPATIENT
Start: 2023-10-18

## 2023-10-18 NOTE — TELEPHONE ENCOUNTER
Name from pharmacy: AMLODIPINE BESYLATE 5MG TABLETS         Will file in chart as: AMLODIPINE 5 MG Oral Tab    The original prescription was discontinued on 5/1/2023 by Zachery Mendiola MD for the following reason: Side effects. Renewing this prescription may not be appropriate.     Sig: TAKE 1 TABLET(5 MG) BY MOUTH TWICE DAILY    Disp: 180 tablet    Refills: 0 (Pharmacy requested: Not specified)    Start: 10/18/2023    Class: Normal    Non-formulary For: Essential hypertension    Last ordered: 7 months ago (3/17/2023) by José Luis Hines MD    Last refill: 3/17/2023    Rx #: [id number]    Hypertension Medications Protocol Mgujoc64/18/2023 05:13 AM   Protocol Details CMP or BMP in past 12 months    Last serum creatinine< 2.0    Appointment in past 6 or next 3 months      To be filled at: [address], [address] [facility]  [address] [facility] AT [facility], [phone], [phone]

## 2023-10-19 ENCOUNTER — TELEPHONE (OUTPATIENT)
Dept: INTERNAL MEDICINE CLINIC | Facility: CLINIC | Age: 83
End: 2023-10-19

## 2023-10-19 ENCOUNTER — OFFICE VISIT (OUTPATIENT)
Dept: PODIATRY CLINIC | Facility: CLINIC | Age: 83
End: 2023-10-19

## 2023-10-19 DIAGNOSIS — Z09 FOLLOW-UP EXAM: ICD-10-CM

## 2023-10-19 DIAGNOSIS — B35.1 DERMATOPHYTOSIS OF NAIL: Primary | ICD-10-CM

## 2023-10-19 DIAGNOSIS — M17.11 PRIMARY OSTEOARTHRITIS OF RIGHT KNEE: Primary | ICD-10-CM

## 2023-10-19 DIAGNOSIS — L60.2 OG (ONYCHOGRYPHOSIS): ICD-10-CM

## 2023-10-19 DIAGNOSIS — I10 PRIMARY HYPERTENSION: Chronic | ICD-10-CM

## 2023-10-19 DIAGNOSIS — M79.675 PAIN DUE TO ONYCHOMYCOSIS OF TOENAIL OF LEFT FOOT: ICD-10-CM

## 2023-10-19 DIAGNOSIS — B35.1 PAIN DUE TO ONYCHOMYCOSIS OF TOENAIL OF LEFT FOOT: ICD-10-CM

## 2023-10-19 PROCEDURE — 99203 OFFICE O/P NEW LOW 30 MIN: CPT | Performed by: PODIATRIST

## 2023-10-19 RX ORDER — ATORVASTATIN CALCIUM 10 MG/1
TABLET, FILM COATED ORAL
Qty: 90 TABLET | Refills: 0 | Status: SHIPPED | OUTPATIENT
Start: 2023-10-19

## 2023-10-19 RX ORDER — LISINOPRIL 10 MG/1
10 TABLET ORAL DAILY
Qty: 90 TABLET | Refills: 0 | Status: SHIPPED | OUTPATIENT
Start: 2023-10-19

## 2023-10-19 RX ORDER — LISINOPRIL 10 MG/1
10 TABLET ORAL DAILY
Qty: 90 TABLET | Refills: 0 | OUTPATIENT
Start: 2023-10-19

## 2023-10-19 NOTE — TELEPHONE ENCOUNTER
Pt wants referral entered for Orthopedic. Pt requesting call once referral is in the system so she can make an appointment.      Enzo Song PA-C

## 2023-10-19 NOTE — TELEPHONE ENCOUNTER
PROTOCOL PASSED   Name from pharmacy: ATORVASTATIN 10MG TABLETS         Will file in chart as: ATORVASTATIN 10 MG Oral Tab    Sig: TAKE 1 TABLET(10 MG) BY MOUTH IN THE MORNING    Disp: 90 tablet    Refills: 0 (Pharmacy requested: Not specified)    Start: 10/18/2023    Class: Normal    Non-formulary For: Hyperlipidemia, unspecified hyperlipidemia type    Last ordered: 3 months ago (7/21/2023) by Ines Herrera MD    Last refill: 7/21/2023    Rx #: [id number]    Cholesterol Medication Protocol Stgldt34/18/2023 05:13 AM   Protocol Details ALT < 80    ALT resulted within past year    Lipid panel within past 12 months    Appointment within past 12 or next 3 months      To be filled at: [address], [address] AT [facility], [phone], [phone]     LOV: 09/26/23 W/ DV  RTC: NO F/U ON FILE   RECENT LABS: 04/2023   FOV: NO FOV

## 2023-10-19 NOTE — TELEPHONE ENCOUNTER
I'm unable to add Laverne Raza -PA as provider. Added Willma Chamber and patient should be able to see PA correct? Also the wrong refill was requested. Patient does not take amlodipine, she needs refill for Lisinopril.

## 2023-10-28 NOTE — PROGRESS NOTES
Rosanna Rivas is a 82year old female. Patient presents with:  Toenail Fungus: Consult - L great toe - Pt states her nail is thick and has discoloration for a long time. Denies any pain. HPI:   This pleasant 28-year-old female patient presents to the clinic with a chief complaint of thickened discolored toenails that are sometimes uncomfortable in shoe gear she has had it for a long time. She only gets discomfort when she wears a shoe that rubs. At today's visit reviewed nurse's history as taken above, allergies medications and medical history as documented below. All changes duly noted  Allergies: Patient has no known allergies. Current Outpatient Medications   Medication Sig Dispense Refill    lisinopril 10 MG Oral Tab Take 1 tablet (10 mg total) by mouth daily. 90 tablet 0    levothyroxine 75 MCG Oral Tab Take 1 tablet (75 mcg total) by mouth before breakfast. 90 tablet 1    metoprolol succinate  MG Oral Tablet 24 Hr Take 1 tablet (100 mg total) by mouth daily. 90 tablet 3    Turmeric (QC TUMERIC COMPLEX OR) Take by mouth. MELOXICAM 7.5 MG Oral Tab TAKE 1 TABLET(7.5 MG) BY MOUTH DAILY 90 tablet 0    Vitamin D3, Cholecalciferol, 25 MCG (1000 UT) Oral Tab Take 1 tablet (1,000 Units total) by mouth daily. Ascorbic Acid (VITAMIN C) 100 MG Oral Tab Take 1 tablet (100 mg total) by mouth daily. Multiple Vitamin (MULTIVITAMIN ADULT) Oral Tab Take by mouth daily. Glucosamine-Chondroit-Vit C-Mn (GLUCOSAMINE-CHONDROITIN) Oral Tab Take 1 tablet by mouth 2 (two) times a day. Omega-3 Fatty Acids (FISH OIL OR) Take by mouth daily. aspirin 81 MG Oral Tab Take 1 tablet (81 mg total) by mouth once daily.  90 tablet 3    ATORVASTATIN 10 MG Oral Tab TAKE 1 TABLET(10 MG) BY MOUTH IN THE MORNING 90 tablet 0      Past Medical History:   Diagnosis Date    Atypical chest pain     (-) cardiolite 9/09    Elevated fasting glucose     (-) gtt 8/12    High cholesterol     Hypertension Osteopenia     Other and unspecified hyperlipidemia     Unspecified essential hypertension       Past Surgical History:   Procedure Laterality Date    APPENDECTOMY      OTHER SURGICAL HISTORY      goiter removed 1983      Family History   Problem Relation Age of Onset    Hypertension Father     Hypertension Mother     Diabetes Sister       Social History    Socioeconomic History      Marital status:     Tobacco Use      Smoking status: Never      Smokeless tobacco: Never    Vaping Use      Vaping Use: Never used    Substance and Sexual Activity      Alcohol use: No        Alcohol/week: 0.0 standard drinks of alcohol      Drug use: No    Other Topics      Concerns:        Caffeine Concern: Yes          2x/week coffee        Exercise: Yes          stays active at home        Seat Belt: Yes          REVIEW OF SYSTEMS:   Today reviewed systens as documented below  GENERAL HEALTH: feels well otherwise  SKIN: Refer to exam below  RESPIRATORY: denies shortness of breath with exertion  CARDIOVASCULAR: denies chest pain on exertion  GI: denies abdominal pain and denies heartburn  NEURO: denies headaches    EXAM:   There were no vitals taken for this visit. GENERAL: well developed, well nourished, in no apparent distress  EXTREMITIES:   1. Integument: The skin on her feet is warm and dry. She has slight desquamation on both feet her hallux nails and several lesser toenails are thickened yellowed and dystrophic a representative specimen was taken of the most affected toenail for fungal culture. 2. Vascular: Patient has palpable pulses that are symmetrical and equivocal bilateral   3. Neurologic: Patient has intact sensorium there are no deficits in the   4. Musculoskeletal: Patient has good muscle strength. ASSESSMENT AND PLAN:   Diagnoses and all orders for this visit:    Dermatophytosis of nail  -     Dermatophyte Only, Culture [E];  Future    Pain due to onychomycosis of toenail of left foot    OG (onychogryphosis)        Plan: Today I told her we will take about 3 to 4 weeks to get the result we will call her with that I did discuss Lamisil tablet therapy with her as long as she has no history of liver disease. Follow-up again after the results are obtained. Also discussed topical is doing nothing at all and routine trimming. The patient indicates understanding of these issues and agrees to the plan.     Nathalia Reid DPM

## 2023-11-02 ENCOUNTER — TELEPHONE (OUTPATIENT)
Dept: ORTHOPEDICS CLINIC | Facility: CLINIC | Age: 83
End: 2023-11-02

## 2023-11-02 DIAGNOSIS — B35.1 ONYCHOMYCOSIS: Primary | ICD-10-CM

## 2023-11-02 NOTE — TELEPHONE ENCOUNTER
S/w patient and informed her of positive fungal culture results. She would like to proceed with Lamisil therapy and will get hepatic lab drawn tomorrow. Patient did inform me that she is leaving on trip to [facility] from 11/28/23 to 1/31/24. Please advise if we could Rx 90 days or if we should try another medication.    Please advise

## 2023-11-02 NOTE — TELEPHONE ENCOUNTER
----- Message from Kd Burroughs DPM sent at 10/23/2023  5:12 PM CDT -----  Results reviewed.  Please inform patient that fungal culture results are positive and we should proceed with Lamisil tablet therapy.  If the patient agrees we have to do a hepatic function panel, please place that order for me with a diagnosis of onychomycosis.

## 2023-11-02 NOTE — TELEPHONE ENCOUNTER
She will proceed on getting labs drawn. We will follow up with her once results are in.    EDGARD

## 2023-11-02 NOTE — TELEPHONE ENCOUNTER
We can also wait until she gets back to started.  Order for liver function looks good and she has no history of liver disease give her 90-day supply.

## 2023-11-03 ENCOUNTER — LAB ENCOUNTER (OUTPATIENT)
Dept: LAB | Age: 83
End: 2023-11-03
Attending: PODIATRIST
Payer: MEDICARE

## 2023-11-03 ENCOUNTER — OFFICE VISIT (OUTPATIENT)
Dept: ORTHOPEDICS CLINIC | Facility: CLINIC | Age: 83
End: 2023-11-03
Payer: MEDICARE

## 2023-11-03 VITALS — WEIGHT: 120 LBS | HEIGHT: 61.5 IN | BODY MASS INDEX: 22.37 KG/M2

## 2023-11-03 DIAGNOSIS — B35.1 ONYCHOMYCOSIS: ICD-10-CM

## 2023-11-03 DIAGNOSIS — M17.11 PRIMARY OSTEOARTHRITIS OF RIGHT KNEE: Primary | ICD-10-CM

## 2023-11-03 LAB
ALBUMIN SERPL-MCNC: 4 G/DL (ref 3.4–5)
ALP LIVER SERPL-CCNC: 67 U/L
ALT SERPL-CCNC: 30 U/L
AST SERPL-CCNC: 26 U/L (ref 15–37)
BILIRUB DIRECT SERPL-MCNC: 0.2 MG/DL (ref 0–0.2)
BILIRUB SERPL-MCNC: 0.8 MG/DL (ref 0.1–2)
PROT SERPL-MCNC: 7.9 G/DL (ref 6.4–8.2)

## 2023-11-03 PROCEDURE — 80076 HEPATIC FUNCTION PANEL: CPT

## 2023-11-03 PROCEDURE — 36415 COLL VENOUS BLD VENIPUNCTURE: CPT

## 2023-11-03 PROCEDURE — 99213 OFFICE O/P EST LOW 20 MIN: CPT | Performed by: PHYSICIAN ASSISTANT

## 2023-11-03 NOTE — PROGRESS NOTES
[facility] Progress Note    Subjective: Patient returns to clinic for evaluation of both knees. Patient previously underwent right knee injection on 2/3/2023 and reports that this only helped for 1 month. She continues to take meloxicam as needed which has been helpful. She is leaving on a trip to the [facility] on 11/28/2023 and is hoping for something that can offer a longer amount of relief than the previous steroid injections. She also reports that with regards to the left knee, she was ascending 3 flights of stairs at [employer] in early October and had dealt with anterior and medial knee pain for 2 weeks afterwards. With diligent use of meloxicam, the pain subsided. She now experiences no significant pain in the left knee. Objective: Patient is seated comfortably in the exam chair. Alert and oriented x3. Nonlabored breathing progress neurologically intact. Left knee extension is full to 0 degrees and flexion is to 130 degrees. There is no significant tenderness along the medial or lateral joint lines. Assessment/Plan: With regards to the left knee, we do not have any x-ray imaging in clinic today but I would suspect some degree of arthritis. Since the pain is currently well controlled we will hold off from any further intervention of the left knee. Regards to the right knee, I discussed alternative nonsurgical measures including Zilretta versus viscosupplementation injection. Ultimately, I recommended trying a Zilretta injection first and prior authorization was sent for this medication. We will attempt to provide the patient with the injection prior to her trip to the [facility] at the end of November. Patient and her son are in agreement with this plan. Their questions were sought and answered satisfactorily. They are happy with the plan will follow as advised.       Zhen Collins PA-C  [facility] Orthopedic Surgery    This note was dictated using Dragon software. While it was briefly proofread prior to completion, some grammatical, spelling, and word choice errors due to dictation may still occur.

## 2023-11-06 ENCOUNTER — TELEPHONE (OUTPATIENT)
Dept: ORTHOPEDICS CLINIC | Facility: CLINIC | Age: 83
End: 2023-11-06

## 2023-11-09 ENCOUNTER — TELEPHONE (OUTPATIENT)
Dept: PODIATRY CLINIC | Facility: CLINIC | Age: 83
End: 2023-11-09

## 2023-11-09 NOTE — TELEPHONE ENCOUNTER
S/w patient- patient had completed labs for Lamisil. Hepatic function was normal from 11/3/23. Per 11/2/23 TE, patient shared that she is leaving for the [facility] at the end of the month and returns [facility]. We had discussed sending the 90 days of medication to patient and you had stated that we could do 90 day supply because she has no history of liver disease. I will pend order for lamsil 250mg #90, 0 refills.     Please sign if you approve    I booked patient for follow up visit on 2/14/23 when she returns from being out of the country

## 2023-11-09 NOTE — TELEPHONE ENCOUNTER
Patient states that she had her lab tests done and wants to know if Dr Jigna Barnard will be prescribing any medication?

## 2023-11-10 RX ORDER — TERBINAFINE HYDROCHLORIDE 250 MG/1
250 TABLET ORAL DAILY
Qty: 90 TABLET | Refills: 0 | Status: SHIPPED | OUTPATIENT
Start: 2023-11-10

## 2023-11-10 NOTE — TELEPHONE ENCOUNTER
Called pt and informed her per Dr Benny Walker orders. She states she will be back in the country in February and already scheduled a f/u on 2/14.  She had no further q's

## 2023-11-12 DIAGNOSIS — I10 PRIMARY HYPERTENSION: Chronic | ICD-10-CM

## 2023-11-13 RX ORDER — LISINOPRIL 10 MG/1
10 TABLET ORAL DAILY
Qty: 90 TABLET | Refills: 0 | Status: SHIPPED | OUTPATIENT
Start: 2023-11-13

## 2023-11-13 NOTE — TELEPHONE ENCOUNTER
PROTOCOL PASSED   Name from pharmacy: LISINOPRIL 10MG TABLETS         Will file in chart as: LISINOPRIL 10 MG Oral Tab    Sig: TAKE 1 TABLET(10 MG) BY MOUTH DAILY    Disp: 90 tablet    Refills: 0 (Pharmacy requested: Not specified)    Start: 11/12/2023    Class: Normal    Non-formulary For: Primary hypertension    Last ordered: 3 weeks ago (10/19/2023) by Pastor Giang MD    Last refill: 10/19/2023    Rx #: [id number]    Hypertension Medications Protocol [id number]/12/2023 03:12 PM   Protocol Details CMP or BMP in past 12 months    Last serum creatinine< 2.0    Appointment in past 6 or next 3 months      To be filled at: [address], [address] [facility] [place], [phone], [phone]     LOV: 09/26/23 w/ DV   RTC: No Follow Up on File   FOV: NO FOV

## 2023-11-14 ENCOUNTER — OFFICE VISIT (OUTPATIENT)
Dept: PODIATRY CLINIC | Facility: CLINIC | Age: 83
End: 2023-11-14

## 2023-11-14 DIAGNOSIS — B35.1 PAIN DUE TO ONYCHOMYCOSIS OF TOENAIL OF LEFT FOOT: ICD-10-CM

## 2023-11-14 DIAGNOSIS — L60.2 OG (ONYCHOGRYPHOSIS): ICD-10-CM

## 2023-11-14 DIAGNOSIS — M79.675 PAIN DUE TO ONYCHOMYCOSIS OF TOENAIL OF LEFT FOOT: ICD-10-CM

## 2023-11-14 DIAGNOSIS — R73.03 PREDIABETES: Chronic | ICD-10-CM

## 2023-11-14 DIAGNOSIS — B35.1 DERMATOPHYTOSIS OF NAIL: Primary | ICD-10-CM

## 2023-11-14 PROCEDURE — 99213 OFFICE O/P EST LOW 20 MIN: CPT | Performed by: PODIATRIST

## 2023-11-19 NOTE — PROGRESS NOTES
Arianne Baptiste is a 82year old female. Chief Complaint   Patient presents with    Toe Pain     Object fell on left hallux 2 weeks ago, nail discolored - pain rated 8/10 at first, but subsided, can move toe         HPI:   This pleasant 33-year-old female patient presents to the clinic with a chief complaint of thickened discolored toenails that are sometimes uncomfortable in shoe gear she has had it for a long time. She only gets discomfort when she wears a shoe that rubs. At today's visit reviewed nurse's history as taken above, allergies medications and medical history as documented below. All changes duly noted  Allergies: Patient has no known allergies. Current Outpatient Medications   Medication Sig Dispense Refill    lisinopril 10 MG Oral Tab Take 1 tablet (10 mg total) by mouth daily. 90 tablet 0    ATORVASTATIN 10 MG Oral Tab TAKE 1 TABLET(10 MG) BY MOUTH IN THE MORNING 90 tablet 0    levothyroxine 75 MCG Oral Tab Take 1 tablet (75 mcg total) by mouth before breakfast. 90 tablet 1    metoprolol succinate  MG Oral Tablet 24 Hr Take 1 tablet (100 mg total) by mouth daily. 90 tablet 3    Turmeric (QC TUMERIC COMPLEX OR) Take by mouth. Vitamin D3, Cholecalciferol, 25 MCG (1000 UT) Oral Tab Take 1 tablet (1,000 Units total) by mouth daily. Ascorbic Acid (VITAMIN C) 100 MG Oral Tab Take 1 tablet (100 mg total) by mouth daily. Multiple Vitamin (MULTIVITAMIN ADULT) Oral Tab Take by mouth daily. Glucosamine-Chondroit-Vit C-Mn (GLUCOSAMINE-CHONDROITIN) Oral Tab Take 1 tablet by mouth 2 (two) times a day. Omega-3 Fatty Acids (FISH OIL OR) Take by mouth daily. aspirin 81 MG Oral Tab Take 1 tablet (81 mg total) by mouth once daily. 90 tablet 3    terbinafine 250 MG Oral Tab Take 1 tablet (250 mg total) by mouth daily.  (Patient not taking: Reported on 11/14/2023) 90 tablet 0    MELOXICAM 7.5 MG Oral Tab TAKE 1 TABLET(7.5 MG) BY MOUTH DAILY (Patient not taking: Reported on 11/14/2023) 90 tablet 0      Past Medical History:   Diagnosis Date    Atypical chest pain     (-) cardiolite 9/09    Elevated fasting glucose     (-) gtt 8/12    High cholesterol     Hypertension     Osteopenia     Other and unspecified hyperlipidemia     Unspecified essential hypertension       Past Surgical History:   Procedure Laterality Date    APPENDECTOMY      OTHER SURGICAL HISTORY      goiter removed 1983      Family History   Problem Relation Age of Onset    Hypertension Father     Hypertension Mother     Diabetes Sister       Social History     Socioeconomic History    Marital status:    Tobacco Use    Smoking status: Never    Smokeless tobacco: Never   Vaping Use    Vaping Use: Never used   Substance and Sexual Activity    Alcohol use: No     Alcohol/week: 0.0 standard drinks of alcohol    Drug use: No   Other Topics Concern    Caffeine Concern Yes     Comment: 2x/week coffee    Exercise Yes     Comment: stays active at home    Seat Belt Yes           REVIEW OF SYSTEMS:   Today reviewed systens as documented below  GENERAL HEALTH: feels well otherwise  SKIN: Refer to exam below  RESPIRATORY: denies shortness of breath with exertion  CARDIOVASCULAR: denies chest pain on exertion  GI: denies abdominal pain and denies heartburn  NEURO: denies headaches    EXAM:   There were no vitals taken for this visit. GENERAL: well developed, well nourished, in no apparent distress  EXTREMITIES:   1. Integument: The skin on her feet is warm and dry. She has slight desquamation on both feet her hallux nails and several lesser toenails are thickened yellowed and dystrophic a representative specimen was taken of the most affected toenail for fungal culture. 2. Vascular: Patient has palpable pulses that are symmetrical and equivocal bilateral   3. Neurologic: Patient has intact sensorium there are no deficits in the   4. Musculoskeletal: Patient has good muscle strength.     ASSESSMENT AND PLAN:   Diagnoses and all orders for this visit:    Dermatophytosis of nail    Pain due to onychomycosis of toenail of left foot    OG (onychogryphosis)    Prediabetes        Plan: Today using a nail nippers were trimmed and debrided toenails 1-5 manually and mechanically in girth and width as far down to healthy tissue as possible on both feet. This was done uneventfully there was no hemorrhage. There is mild incurvation of the medial and lateral nail borders of the hallux which using a slant back technique were removed and they would not get ingrown. Some changes noticed in the base of the nails. We will repeat hepatic function panel get her on a second round of Lamisil tablet therapy. At today's visit I reminded the patient about daily foot checks  Reminded patient again of proper control of his diabetes to help prevent any severe complications in his feet  Proper foot hygiene moisturizing except between the toes was reviewed  Advised follow-up again every 2 to 3 months for routine care but emphasized to him the importance of coming in sooner if he notices any problems. The patient indicates understanding of these issues and agrees to the plan.     Denis Osorio DPM

## 2023-11-21 ENCOUNTER — OFFICE VISIT (OUTPATIENT)
Facility: CLINIC | Age: 83
End: 2023-11-21
Payer: MEDICARE

## 2023-11-21 DIAGNOSIS — M17.11 PRIMARY OSTEOARTHRITIS OF RIGHT KNEE: Primary | ICD-10-CM

## 2023-11-21 PROCEDURE — 20610 DRAIN/INJ JOINT/BURSA W/O US: CPT | Performed by: PHYSICIAN ASSISTANT

## 2023-11-21 NOTE — PROCEDURES
After informed consent, the patient's right knee was marked, locally anesthetized with skin refrigerant, prepped with topical antiseptic, and injected with 5mL of 32mg/5mL Zilretta through the inferolateral portal.  A band-aid was applied. The patient tolerated the procedure well.     Gilson Frye PA-C  [facility] Orthopedic Surgery

## 2023-12-14 NOTE — TELEPHONE ENCOUNTER
Message sent to referral department for update.
Pt at [address] [place] [address] Rd with  and is requesting referral for bedside commode. Referral entered and awaiting approval. Once approved, referral will need to be faxed to vendor.
Referral authorized and faxed to [facility]. Pt notified.
with patient

## 2023-12-26 DIAGNOSIS — E03.8 OTHER SPECIFIED HYPOTHYROIDISM: Chronic | ICD-10-CM

## 2023-12-27 RX ORDER — LEVOTHYROXINE SODIUM 0.07 MG/1
75 TABLET ORAL
Qty: 90 TABLET | Refills: 1 | Status: SHIPPED | OUTPATIENT
Start: 2023-12-27

## 2023-12-27 NOTE — TELEPHONE ENCOUNTER
PROTOCOL PASSED   Name from pharmacy: LEVOTHYROXINE 0.075MG (75MCG) TABS         Will file in chart as: LEVOTHYROXINE 75 MCG Oral Tab    Sig: TAKE 1 TABLET(75 MCG) BY MOUTH BEFORE BREAKFAST    Disp: 90 tablet    Refills: 1 (Pharmacy requested: Not specified)    Start: 12/26/2023    Class: Normal    Non-formulary For: Other specified hypothyroidism    Last ordered: 6 months ago (6/21/2023) by Shelby Richter MD    Last refill: 9/19/2023    Rx #: [id number]    Thyroid Supplements Protocol [id number]/26/2023 04:38 PM   Protocol Details TSH test in past 12 months    TSH value between 0.350 and 5.500 IU/ml    Appointment in past 12 or next 3 months      To be filled at: [id number] - [place], [place] - [address] AT [facility] [address], [phone], [phone]        LOV:  RTC:  RECENT LABS:  FOV:

## 2024-02-05 ENCOUNTER — TELEPHONE (OUTPATIENT)
Dept: INTERNAL MEDICINE CLINIC | Facility: CLINIC | Age: 84
End: 2024-02-05

## 2024-02-05 DIAGNOSIS — R73.03 PREDIABETES: Primary | ICD-10-CM

## 2024-02-05 DIAGNOSIS — E78.5 HYPERLIPIDEMIA, UNSPECIFIED HYPERLIPIDEMIA TYPE: Chronic | ICD-10-CM

## 2024-02-05 NOTE — TELEPHONE ENCOUNTER
Patient called to request referral for OPHTHALMOLOGY - Abhijit Valdez - prediabetes yearly eye exam. Current referral on file is . Please fax once authorized.    Fax: [phone]  Phone: [phone]

## 2024-02-06 RX ORDER — ATORVASTATIN CALCIUM 10 MG/1
10 TABLET, FILM COATED ORAL EVERY MORNING
Qty: 90 TABLET | Refills: 3 | Status: SHIPPED | OUTPATIENT
Start: 2024-02-06

## 2024-02-06 NOTE — TELEPHONE ENCOUNTER
PROTOCOL PASSED   Name from pharmacy: ATORVASTATIN 10MG TABLETS         Will file in chart as: ATORVASTATIN 10 MG Oral Tab    Sig: TAKE 1 TABLET(10 MG) BY MOUTH IN THE MORNING    Disp: 90 tablet    Refills: 0 (Pharmacy requested: Not specified)    Start: 2/5/2024    Class: Normal    Non-formulary For: Hyperlipidemia, unspecified hyperlipidemia type    Last ordered: 3 months ago (10/19/2023) by Shelby Garduno MD    Last refill: 10/19/2023    Rx #: [id number]    Cholesterol Medication Protocol Piwsen4002/05/2024 05:14 AM   Protocol Details ALT < 80    ALT resulted within past year    Lipid panel within past 12 months    Appointment within past 12 or next 3 months      To be filled at: [facility] #[id number] [place] [address] AT [facility] [address], [phone], [phone]     LOV: 09/26/23 w/ DV   RTC: No Follow Up on File   RECENT LABS: 09/26/23   FOV: NO FOV

## 2024-02-14 ENCOUNTER — OFFICE VISIT (OUTPATIENT)
Dept: PODIATRY CLINIC | Facility: CLINIC | Age: 84
End: 2024-02-14

## 2024-02-14 DIAGNOSIS — B35.1 PAIN DUE TO ONYCHOMYCOSIS OF TOENAIL OF LEFT FOOT: Primary | ICD-10-CM

## 2024-02-14 DIAGNOSIS — M79.675 PAIN DUE TO ONYCHOMYCOSIS OF TOENAIL OF LEFT FOOT: Primary | ICD-10-CM

## 2024-02-14 PROCEDURE — 99213 OFFICE O/P EST LOW 20 MIN: CPT | Performed by: PODIATRIST

## 2024-02-14 NOTE — PROGRESS NOTES
Gerda Crowder is a 83 year old female.   Chief Complaint   Patient presents with    Follow - Up     Left great toe - still black- patient denies pain          HPI:   Patient returns to the clinic at this time she is completed couple rounds of Lamisil she did go to the [facility] with the last bottle.  She still complains of discoloration of her left great toenail.  At today's visit reviewed nurse's history as taken above, allergies medications and medical history as documented below.  All changes duly noted  Allergies: Patient has no known allergies.   Current Outpatient Medications   Medication Sig Dispense Refill    atorvastatin 10 MG Oral Tab Take 1 tablet (10 mg total) by mouth every morning. 90 tablet 3    levothyroxine 75 MCG Oral Tab Take 1 tablet (75 mcg total) by mouth before breakfast. 90 tablet 1    lisinopril 10 MG Oral Tab Take 1 tablet (10 mg total) by mouth daily. 90 tablet 0    terbinafine 250 MG Oral Tab Take 1 tablet (250 mg total) by mouth daily. 90 tablet 0    metoprolol succinate  MG Oral Tablet 24 Hr Take 1 tablet (100 mg total) by mouth daily. 90 tablet 3    Turmeric (QC TUMERIC COMPLEX OR) Take by mouth.      MELOXICAM 7.5 MG Oral Tab TAKE 1 TABLET(7.5 MG) BY MOUTH DAILY 90 tablet 0    Vitamin D3, Cholecalciferol, 25 MCG (1000 UT) Oral Tab Take 1 tablet (1,000 Units total) by mouth daily.      Ascorbic Acid (VITAMIN C) 100 MG Oral Tab Take 1 tablet (100 mg total) by mouth daily.      Multiple Vitamin (MULTIVITAMIN ADULT) Oral Tab Take by mouth daily.      Glucosamine-Chondroit-Vit C-Mn (GLUCOSAMINE-CHONDROITIN) Oral Tab Take 1 tablet by mouth 2 (two) times a day.      Omega-3 Fatty Acids (FISH OIL OR) Take by mouth daily.      aspirin 81 MG Oral Tab Take 1 tablet (81 mg total) by mouth once daily. 90 tablet 3      Past Medical History:   Diagnosis Date    Atypical chest pain     (-) cardiolite 9/09    Elevated fasting glucose     (-) gtt 8/12    High cholesterol     Hypertension      Osteopenia     Other and unspecified hyperlipidemia     Unspecified essential hypertension       Past Surgical History:   Procedure Laterality Date    APPENDECTOMY      OTHER SURGICAL HISTORY      goiter removed 1983      Family History   Problem Relation Age of Onset    Hypertension Father     Hypertension Mother     Diabetes Sister       Social History     Socioeconomic History    Marital status:    Tobacco Use    Smoking status: Never    Smokeless tobacco: Never   Vaping Use    Vaping Use: Never used   Substance and Sexual Activity    Alcohol use: No     Alcohol/week: 0.0 standard drinks of alcohol    Drug use: No   Other Topics Concern    Caffeine Concern Yes     Comment: 2x/week coffee    Exercise Yes     Comment: stays active at home    Seat Belt Yes           REVIEW OF SYSTEMS:   Today reviewed systens as documented below  GENERAL HEALTH: feels well otherwise  SKIN: Refer to exam below  RESPIRATORY: denies shortness of breath with exertion  CARDIOVASCULAR: denies chest pain on exertion  GI: denies abdominal pain and denies heartburn  NEURO: denies headaches    EXAM:   There were no vitals taken for this visit.  GENERAL: well developed, well nourished, in no apparent distress  EXTREMITIES:   1. Integument: Left great toenail is thickened its dystrophic it is discolored.  Looks like it may have bled a little bit underneath.   2. Vascular: Patient has palpable pulses on the   3. Neurologic: Patient has intact sense   4. Musculoskeletal: Patient has good muscle strength.    ASSESSMENT AND PLAN:   Diagnoses and all orders for this visit:    Pain due to onychomycosis of toenail of left foot        Plan: I explained to her and her son that this takes a while the medication gets incorporated into the nail takes time to grow out up to a year I will see her back in a couple of months.  Today using a nail nippers and a alcon trimmed down and debrided the left hallux nail.    The patient indicates understanding of  these issues and agrees to the plan.    Kd Burroughs DPM

## 2024-02-29 RX ORDER — TERBINAFINE HYDROCHLORIDE 250 MG/1
250 TABLET ORAL DAILY
Qty: 90 TABLET | Refills: 0 | OUTPATIENT
Start: 2024-02-29

## 2024-04-10 DIAGNOSIS — M17.11 PRIMARY OSTEOARTHRITIS OF RIGHT KNEE: ICD-10-CM

## 2024-04-10 RX ORDER — MELOXICAM 7.5 MG/1
7.5 TABLET ORAL DAILY
Qty: 90 TABLET | Refills: 0 | Status: SHIPPED | OUTPATIENT
Start: 2024-04-10

## 2024-04-10 NOTE — TELEPHONE ENCOUNTER
Patient is requesting a refill on meloxicam from [facility]. Patients LOV was 11.03.2023. No upcoming appointments on file.    Please advise if appropriate and the pharmacy on file is accurate.      [id number] - [place], [place] - [address] AT [facility] [address], [phone], [phone]  [address]  [facility] [address]  Phone: [phone] Fax: [phone]

## 2024-04-10 NOTE — TELEPHONE ENCOUNTER
Does she need to schedule a follow-up appointment first?    Meloxicam    Last OV: 11/3/23 Primary osteoarthritis to right knee  Last refill date: 11/17/23 #/refills: 90/0  Upcoming appt: No future appointments.    Component      Latest Ref Rng 4/26/2023   Glucose      70 - 99 mg/dL 119 (H)    Sodium      136 - 145 mmol/L 138    Potassium      3.5 - 5.1 mmol/L 4.5    Chloride      98 - 112 mmol/L 108    Carbon Dioxide, Total      21.0 - 32.0 mmol/L 29.0    ANION GAP      0 - 18 mmol/L 1    BUN      7 - 18 mg/dL 16    CREATININE      0.55 - 1.02 mg/dL 0.80    CALCIUM      8.5 - 10.1 mg/dL 9.1    CALCULATED OSMOLALITY      275 - 295 mOsm/kg 288    EGFR      >=60 mL/min/1.73m2 74    AST (SGOT)      15 - 37 U/L 22    ALT (SGPT)      13 - 56 U/L 30    ALKALINE PHOSPHATASE      55 - 142 U/L 55    Total Bilirubin      0.1 - 2.0 mg/dL 0.7    PROTEIN, TOTAL      6.4 - 8.2 g/dL 7.2    Albumin      3.4 - 5.0 g/dL 3.6    Globulin      2.8 - 4.4 g/dL 3.6    A/G Ratio      1.0 - 2.0  1.0    Patient Fasting for CMP? Yes       Legend:  (H) High

## 2024-04-15 ENCOUNTER — TELEPHONE (OUTPATIENT)
Dept: INTERNAL MEDICINE CLINIC | Facility: CLINIC | Age: 84
End: 2024-04-15

## 2024-04-15 DIAGNOSIS — E78.5 HYPERLIPIDEMIA, UNSPECIFIED HYPERLIPIDEMIA TYPE: Chronic | ICD-10-CM

## 2024-04-15 DIAGNOSIS — E03.8 OTHER SPECIFIED HYPOTHYROIDISM: Primary | Chronic | ICD-10-CM

## 2024-04-15 DIAGNOSIS — Z01.89 ROUTINE LAB DRAW: ICD-10-CM

## 2024-04-15 DIAGNOSIS — I10 PRIMARY HYPERTENSION: Chronic | ICD-10-CM

## 2024-04-15 NOTE — TELEPHONE ENCOUNTER
Pt has upcoming AWV, requesting lab orders.     Future Appointments   Date Time Provider Department Center   6/12/2024 11:00 AM Shelby Yarbrough MD [facility] [place]

## 2024-05-29 DIAGNOSIS — I10 PRIMARY HYPERTENSION: Chronic | ICD-10-CM

## 2024-05-30 RX ORDER — LISINOPRIL 10 MG/1
10 TABLET ORAL DAILY
Qty: 90 TABLET | Refills: 0 | Status: SHIPPED | OUTPATIENT
Start: 2024-05-30

## 2024-05-30 NOTE — TELEPHONE ENCOUNTER
Requesting    Name from pharmacy: LISINOPRIL 10MG TABLETS         Will file in chart as: LISINOPRIL 10 MG Oral Tab    Sig: TAKE 1 TABLET(10 MG) BY MOUTH DAILY    Disp: 90 tablet    Refills: 0 (Pharmacy requested: Not specified)    Start: 5/29/2024    Class: Normal    Non-formulary For: Primary hypertension    Last ordered: 6 months ago (11/13/2023) by Shelby Garduno MD    Last refill: 11/25/2023    Rx #: [id number]    Hypertension Medications Protocol [id number]/29/2024 09:57 AM   Protocol Details CMP or BMP in past 12 months    EGFRCR or GFRNAA > 50    Last BP reading less than 140/90    In person appointment or virtual visit in the past 12 mos or appointment in next 3 mos      To be filled at: [facility] #[id number] - [place], [place] - [address] [facility] [address], [phone], [phone]     LOV: 9/29/23 w/ DV  RTC: No Follow Up on File  Last Relevant Labs:04/26/23  Filled: 11/13/23 #90 with 0 refills    Future Appointments   Date Time Provider Department Center   6/12/2024 11:00 AM Shelby Yarbrough MD [facility] [place]

## 2024-06-04 ENCOUNTER — LAB ENCOUNTER (OUTPATIENT)
Dept: LAB | Age: 84
End: 2024-06-04
Attending: INTERNAL MEDICINE
Payer: MEDICARE

## 2024-06-04 DIAGNOSIS — E03.8 OTHER SPECIFIED HYPOTHYROIDISM: ICD-10-CM

## 2024-06-04 DIAGNOSIS — E78.5 HYPERLIPIDEMIA, UNSPECIFIED HYPERLIPIDEMIA TYPE: ICD-10-CM

## 2024-06-04 DIAGNOSIS — I10 PRIMARY HYPERTENSION: ICD-10-CM

## 2024-06-04 DIAGNOSIS — Z01.89 ROUTINE LAB DRAW: ICD-10-CM

## 2024-06-04 LAB
ALBUMIN SERPL-MCNC: 4.5 G/DL (ref 3.2–4.8)
ALBUMIN/GLOB SERPL: 1.8 {RATIO} (ref 1–2)
ALP LIVER SERPL-CCNC: 48 U/L
ALT SERPL-CCNC: 18 U/L
ANION GAP SERPL CALC-SCNC: 6 MMOL/L (ref 0–18)
AST SERPL-CCNC: 29 U/L (ref ?–34)
BASOPHILS # BLD AUTO: 0.07 X10(3) UL (ref 0–0.2)
BASOPHILS NFR BLD AUTO: 1.3 %
BILIRUB SERPL-MCNC: 0.7 MG/DL (ref 0.2–1.1)
BUN BLD-MCNC: 11 MG/DL (ref 9–23)
BUN/CREAT SERPL: 13.8 (ref 10–20)
CALCIUM BLD-MCNC: 9.3 MG/DL (ref 8.7–10.4)
CHLORIDE SERPL-SCNC: 108 MMOL/L (ref 98–112)
CHOLEST SERPL-MCNC: 138 MG/DL (ref ?–200)
CO2 SERPL-SCNC: 28 MMOL/L (ref 21–32)
CREAT BLD-MCNC: 0.8 MG/DL
DEPRECATED RDW RBC AUTO: 45.7 FL (ref 35.1–46.3)
EGFRCR SERPLBLD CKD-EPI 2021: 74 ML/MIN/1.73M2 (ref 60–?)
EOSINOPHIL # BLD AUTO: 0.36 X10(3) UL (ref 0–0.7)
EOSINOPHIL NFR BLD AUTO: 6.6 %
ERYTHROCYTE [DISTWIDTH] IN BLOOD BY AUTOMATED COUNT: 12.1 % (ref 11–15)
EST. AVERAGE GLUCOSE BLD GHB EST-MCNC: 123 MG/DL (ref 68–126)
FASTING PATIENT LIPID ANSWER: YES
FASTING STATUS PATIENT QL REPORTED: YES
GLOBULIN PLAS-MCNC: 2.5 G/DL (ref 2–3.5)
GLUCOSE BLD-MCNC: 97 MG/DL (ref 70–99)
HBA1C MFR BLD: 5.9 % (ref ?–5.7)
HCT VFR BLD AUTO: 41.1 %
HDLC SERPL-MCNC: 60 MG/DL (ref 40–59)
HGB BLD-MCNC: 13.5 G/DL
IMM GRANULOCYTES # BLD AUTO: 0.01 X10(3) UL (ref 0–1)
IMM GRANULOCYTES NFR BLD: 0.2 %
LDLC SERPL CALC-MCNC: 64 MG/DL (ref ?–100)
LYMPHOCYTES # BLD AUTO: 1.76 X10(3) UL (ref 1–4)
LYMPHOCYTES NFR BLD AUTO: 32.2 %
MCH RBC QN AUTO: 33.7 PG (ref 26–34)
MCHC RBC AUTO-ENTMCNC: 32.8 G/DL (ref 31–37)
MCV RBC AUTO: 102.5 FL
MONOCYTES # BLD AUTO: 0.54 X10(3) UL (ref 0.1–1)
MONOCYTES NFR BLD AUTO: 9.9 %
NEUTROPHILS # BLD AUTO: 2.73 X10 (3) UL (ref 1.5–7.7)
NEUTROPHILS # BLD AUTO: 2.73 X10(3) UL (ref 1.5–7.7)
NEUTROPHILS NFR BLD AUTO: 49.8 %
NONHDLC SERPL-MCNC: 78 MG/DL (ref ?–130)
OSMOLALITY SERPL CALC.SUM OF ELEC: 293 MOSM/KG (ref 275–295)
PLATELET # BLD AUTO: 212 10(3)UL (ref 150–450)
POTASSIUM SERPL-SCNC: 4.6 MMOL/L (ref 3.5–5.1)
PROT SERPL-MCNC: 7 G/DL (ref 5.7–8.2)
RBC # BLD AUTO: 4.01 X10(6)UL
SODIUM SERPL-SCNC: 142 MMOL/L (ref 136–145)
T4 FREE SERPL-MCNC: 0.8 NG/DL (ref 0.8–1.7)
TRIGL SERPL-MCNC: 70 MG/DL (ref 30–149)
TSI SER-ACNC: 58.32 MIU/ML (ref 0.55–4.78)
VIT D+METAB SERPL-MCNC: 48 NG/ML (ref 30–100)
VLDLC SERPL CALC-MCNC: 10 MG/DL (ref 0–30)
WBC # BLD AUTO: 5.5 X10(3) UL (ref 4–11)

## 2024-06-04 PROCEDURE — 82306 VITAMIN D 25 HYDROXY: CPT

## 2024-06-04 PROCEDURE — 80053 COMPREHEN METABOLIC PANEL: CPT

## 2024-06-04 PROCEDURE — 84443 ASSAY THYROID STIM HORMONE: CPT

## 2024-06-04 PROCEDURE — 36415 COLL VENOUS BLD VENIPUNCTURE: CPT

## 2024-06-04 PROCEDURE — 84439 ASSAY OF FREE THYROXINE: CPT

## 2024-06-04 PROCEDURE — 83036 HEMOGLOBIN GLYCOSYLATED A1C: CPT

## 2024-06-04 PROCEDURE — 80061 LIPID PANEL: CPT

## 2024-06-04 PROCEDURE — 85025 COMPLETE CBC W/AUTO DIFF WBC: CPT

## 2024-06-12 ENCOUNTER — OFFICE VISIT (OUTPATIENT)
Dept: INTERNAL MEDICINE CLINIC | Facility: CLINIC | Age: 84
End: 2024-06-12
Payer: MEDICARE

## 2024-06-12 VITALS
OXYGEN SATURATION: 95 % | RESPIRATION RATE: 15 BRPM | TEMPERATURE: 98 F | HEIGHT: 61.5 IN | HEART RATE: 50 BPM | DIASTOLIC BLOOD PRESSURE: 76 MMHG | BODY MASS INDEX: 24.79 KG/M2 | WEIGHT: 133 LBS | SYSTOLIC BLOOD PRESSURE: 138 MMHG

## 2024-06-12 DIAGNOSIS — M22.40 CHONDROMALACIA OF PATELLA, UNSPECIFIED LATERALITY: Chronic | ICD-10-CM

## 2024-06-12 DIAGNOSIS — E78.5 HYPERLIPIDEMIA, UNSPECIFIED HYPERLIPIDEMIA TYPE: Chronic | ICD-10-CM

## 2024-06-12 DIAGNOSIS — J06.9 UPPER RESPIRATORY TRACT INFECTION, UNSPECIFIED TYPE: ICD-10-CM

## 2024-06-12 DIAGNOSIS — Z00.00 ENCOUNTER FOR ANNUAL WELLNESS VISIT (AWV) IN MEDICARE PATIENT: Primary | ICD-10-CM

## 2024-06-12 DIAGNOSIS — R73.03 PREDIABETES: Chronic | ICD-10-CM

## 2024-06-12 DIAGNOSIS — I10 PRIMARY HYPERTENSION: Chronic | ICD-10-CM

## 2024-06-12 DIAGNOSIS — M17.11 PRIMARY OSTEOARTHRITIS OF RIGHT KNEE: Chronic | ICD-10-CM

## 2024-06-12 DIAGNOSIS — E03.8 OTHER SPECIFIED HYPOTHYROIDISM: Chronic | ICD-10-CM

## 2024-06-12 DIAGNOSIS — M85.80 OSTEOPENIA, UNSPECIFIED LOCATION: ICD-10-CM

## 2024-06-12 PROCEDURE — 99214 OFFICE O/P EST MOD 30 MIN: CPT | Performed by: INTERNAL MEDICINE

## 2024-06-12 PROCEDURE — 96160 PT-FOCUSED HLTH RISK ASSMT: CPT | Performed by: INTERNAL MEDICINE

## 2024-06-12 RX ORDER — ASPIRIN 81 MG/1
81 TABLET ORAL DAILY
Qty: 90 TABLET | Refills: 3 | Status: SHIPPED | OUTPATIENT
Start: 2024-06-12

## 2024-06-12 NOTE — PROGRESS NOTES
Subjective:   Gerda Crowder is a 83 year old female who presents for a MA (Medicare Advantage) Supervisit (Once per calendar year) and the following:     URI- reports that symptoms started on Sunday/Monday. Sore throat.   Denies f/c/sob/cp/cough  Pt reports it is improving with gargling. No longer with sore throat.     HTN-near goal.   Denies cp/sob/angina/palpations.   preDM-improved from prior. Diet   Hypothyroidism-pt self discontinued levothyroxine. TSH not at goal. Has resumed it. Will need repeat labs in a few weeks.      HLD- on statin. Controlled   OA knee/chondromalacia of patella-supportive care. Pt requesting placard because starts to feel pain with long standing and walking.    Osteopenia- due for DEXA. Previously on alendronate    Denies family hx of breast or colon cancer.  Denies breast or nipple changes.     Mammogram-pt no longer does   Colon cancer screening-declines     Reports recent eye exam; gets every year.     Remainder of ROS negative   History/Other:   Fall Risk Assessment:   She has been screened for Falls and is High Risk. Fall Prevention information provided to patient in After Visit Summary.    Do you feel unsteady when standing or walking?: No  Do you worry about falling?: Yes  Have you fallen in the past year?: No     Cognitive Assessment:   She had a completely normal cognitive assessment - see flowsheet entries     Functional Ability/Status:   Gerda Crowder has some abnormal functions as listed below:  She has Driving difficulties based on screening of functional status. She has difficulties Affording Meds based on screening of functional status. She has Walking problems based on screening of functional status.       Depression Screening (PHQ-2/PHQ-9): PHQ-2 SCORE: 0  , done 6/12/2024            Advanced Directives:   She does NOT have a Living Will. [Do you have a living will?: No]  She does NOT have a Power of  for Health Care. [Do you have a healthcare power of  ?: No]  Not discussed      Patient Active Problem List   Diagnosis    Chondromalacia of patella    Hypertension    Hyperlipidemia    Prediabetes    Other specified hypothyroidism    Primary osteoarthritis of right knee     Allergies:  She has No Known Allergies.    Current Medications:  Outpatient Medications Marked as Taking for the 6/12/24 encounter (Office Visit) with Shelby Yarbrough MD   Medication Sig    levothyroxine 75 MCG Oral Tab Take 1 tablet (75 mcg total) by mouth before breakfast.    LISINOPRIL 10 MG Oral Tab TAKE 1 TABLET(10 MG) BY MOUTH DAILY    Meloxicam 7.5 MG Oral Tab Take 1 tablet (7.5 mg total) by mouth daily.    atorvastatin 10 MG Oral Tab Take 1 tablet (10 mg total) by mouth every morning.    terbinafine 250 MG Oral Tab Take 1 tablet (250 mg total) by mouth daily.    metoprolol succinate  MG Oral Tablet 24 Hr Take 1 tablet (100 mg total) by mouth daily.    Turmeric (QC TUMERIC COMPLEX OR) Take by mouth.    Vitamin D3, Cholecalciferol, 25 MCG (1000 UT) Oral Tab Take 1 tablet (1,000 Units total) by mouth daily.    Ascorbic Acid (VITAMIN C) 100 MG Oral Tab Take 1 tablet (100 mg total) by mouth daily.    Multiple Vitamin (MULTIVITAMIN ADULT) Oral Tab Take by mouth daily.    Glucosamine-Chondroit-Vit C-Mn (GLUCOSAMINE-CHONDROITIN) Oral Tab Take 1 tablet by mouth 2 (two) times a day.    Omega-3 Fatty Acids (FISH OIL OR) Take by mouth daily.    aspirin 81 MG Oral Tab Take 1 tablet (81 mg total) by mouth once daily.       Medical History:  She  has a past medical history of Atypical chest pain, Elevated fasting glucose, High cholesterol, Hypertension, Osteopenia, Other and unspecified hyperlipidemia, and Unspecified essential hypertension.  Surgical History:  She  has a past surgical history that includes appendectomy and other surgical history.   Family History:  Her family history includes Diabetes in her sister; Hypertension in her father and mother.  Social History:  She   reports that she has never smoked. She has never used smokeless tobacco. She reports that she does not drink alcohol and does not use drugs.    Tobacco:  She has never smoked tobacco.    CAGE Alcohol Screen:   CAGE screening score of 0 on 6/12/2024, showing low risk of alcohol abuse.      Patient Care Team:  Shelby Yarbrough MD as PCP - General (Internal Medicine)  Abhijit Valdez (OPHTHALMOLOGY)  Roxy Rico DPM (PODIATRIST)    Review of Systems  As in HPI     Objective:   Physical Exam  PHYSICAL EXAM:   General: no acute distress   Eyes: pupils equal and reactive; EOMI; sclera normal; conjunctiva normal   ENT:without erythema or exudate  Neck: trachea midline; no adenopathy;   Hematologic/lymphatic:no cervical lymphadenopathy; no supraclavicular adenopathy   Respiratory: no increased work of breathing; good air exchange; CTAB; no crackles or wheezing   Cardiovascular: RRR; S1, S2; no murmurs; no carotid bruits; no edema   Gastrointestinal: normal bowel sounds; soft; non-distended; non-tender  Neurological: awake, alert, oriented x3; CNII-XII grossly intact;   MSK: full ROM; strength 5/5  Behavioral/Psych: euthymic; appropriate affect   Breasts: symmetrical; no masses or nipple discharge or rashes/lesions noted           /76 (BP Location: Right arm, Patient Position: Sitting, Cuff Size: adult)   Pulse 50   Temp 98.3 °F (36.8 °C) (Temporal)   Resp 15   Ht 5' 1.5\" (1.562 m)   Wt 133 lb (60.3 kg)   SpO2 95%   BMI 24.72 kg/m²  Estimated body mass index is 24.72 kg/m² as calculated from the following:    Height as of this encounter: 5' 1.5\" (1.562 m).    Weight as of this encounter: 133 lb (60.3 kg).    Medicare Hearing Assessment:   Hearing Screening    Time taken: 6/12/2024 10:52 AM  Entry User: Rosa Quiroz MA  Screening Method: Finger Rub  Finger Rub Result: Pass         Visual Acuity:   Right Eye Visual Acuity: Uncorrected Right Eye Chart Acuity: 20/40   Left Eye Visual Acuity:  Uncorrected Left Eye Chart Acuity: 20/70   Both Eyes Visual Acuity: Uncorrected Both Eyes Chart Acuity: 20/40   Able To Tolerate Visual Acuity: Yes        Assessment & Plan:   Gerda Crowder is a 83 year old female who presents for a Medicare Assessment.     Diagnoses and all orders for this visit:    Encounter for annual wellness visit (AWV) in Medicare patient  -as above  Primary hypertension  -continue lisinopril, metoprolol. Check BP at home and follow-up    Hyperlipidemia, unspecified hyperlipidemia type  -continue atorvastatin     Prediabetes  -diet/exercise     Other specified hypothyroidism  -as above. Discussed the importance of taking medication daily.   -levothyroxine. Recheck labs in a few weeks.     Primary osteoarthritis of right knee  Chondromalacia of patella, unspecified laterality  -supportive care  -placard    Osteopenia, unspecified location  -     XR DEXA BONE DENSITOMETRY (CPT=77080); Future    Upper respiratory tract infection, unspecified type  -resolving. No acute symptoms.   Supportive care        The patient indicates understanding of these issues and agrees to the plan.        No follow-ups on file.     Shelby Garduno MD, 6/12/2024     Supplementary Documentation:   General Health:  In the past six months, have you lost more than 10 pounds without trying?: 2 - No  Has your appetite been poor?: No  Type of Diet: Balanced  How does the patient maintain a good energy level?: Daily Walks;Stretching;Other  How would you describe your daily physical activity?: Moderate  How would you describe your current health state?: Good  How do you maintain positive mental well-being?: Social Interaction;Puzzles;Games;Visiting Friends;Visiting Family  On a scale of 0 to 10, with 0 being no pain and 10 being severe pain, what is your pain level?: 9 - (Severe)  In the past six months, have you experienced urine leakage?: 0-No  At any time do you feel concerned for the safety/well-being of yourself  and/or your children, in your home or elsewhere?: No  Have you had any immunizations at another office such as Influenza, Hepatitis B, Tetanus, or Pneumococcal?: Yes       Gerda Crowder's SCREENING SCHEDULE   Tests on this list are recommended by your physician but may not be covered, or covered at this frequency, by your insurer.   Please check with your insurance carrier before scheduling to verify coverage.   PREVENTATIVE SERVICES FREQUENCY &  COVERAGE DETAILS LAST COMPLETION DATE   Diabetes Screening    Fasting Blood Sugar /  Glucose    One screening every 12 months if never tested or if previously tested but not diagnosed with pre-diabetes   One screening every 6 months if diagnosed with pre-diabetes Lab Results   Component Value Date    GLUCOSE 109 (H) 04/10/2013    GLU 97 06/04/2024        Cardiovascular Disease Screening    Lipid Panel  Cholesterol  Lipoprotein (HDL)  Triglycerides Covered every 5 years for all Medicare beneficiaries without apparent signs or symptoms of cardiovascular disease Lab Results   Component Value Date    CHOLEST 138 06/04/2024    HDL 60 (H) 06/04/2024    LDL 64 06/04/2024    TRIG 70 06/04/2024         Electrocardiogram (EKG)   Covered if needed at Welcome to Medicare, and non-screening if indicated for medical reasons 02/28/2022      Ultrasound Screening for Abdominal Aortic Aneurysm (AAA) Covered once in a lifetime for one of the following risk factors    Men who are 65-75 years old and have ever smoked    Anyone with a family history -     Colorectal Cancer Screening  Covered for ages 50-85; only need ONE of the following:    Colonoscopy   Covered every 10 years    Covered every 2 years if patient is at high risk or previous colonoscopy was abnormal -    No recommendations at this time    Flexible Sigmoidoscopy   Covered every 4 years -    Fecal Occult Blood Test Covered annually -   Bone Density Screening    Bone density screening    Covered every 2 years after age 65 if  diagnosed with risk of osteoporosis or estrogen deficiency.    Covered yearly for long-term glucocorticoid medication use (Steroids) Last Dexa Scan:    XR DEXA BONE DENSITOMETRY (CPT=77080) 09/25/2020      No recommendations at this time   Pap and Pelvic    Pap   Covered every 2 years for women at normal risk; Annually if at high risk -  No recommendations at this time    Chlamydia Annually if high risk -  No recommendations at this time   Screening Mammogram    Mammogram     Recommend annually for all female patients aged 40 and older    One baseline mammogram covered for patients aged 35-39 -    No recommendations at this time    Immunizations    Influenza Covered once per flu season  Please get every year 09/26/2023  No recommendations at this time    Pneumococcal Each vaccine ([id number] & [id number]) covered once after 65 Prevnar 13: 09/22/2017    Nhkfpjjdo03: 08/01/2012     No recommendations at this time    Hepatitis B One screening covered for patients with certain risk factors   -  No recommendations at this time    Tetanus Toxoid Not covered by Medicare Part B unless medically necessary (cut with metal); may be covered with your pharmacy prescription benefits 08/01/2012    Tetanus, Diptheria and Pertusis TD and TDaP Not covered by Medicare Part B -  No recommendations at this time    Zoster Not covered by Medicare Part B; may be covered with your pharmacy  prescription benefits -  No recommendations at this time     Annual Monitoring of Persistent Medications (ACE/ARB, digoxin diuretics, anticonvulsants)    Potassium Annually Lab Results   Component Value Date    K 4.6 06/04/2024         Creatinine   Annually Lab Results   Component Value Date    CREATSERUM 0.80 06/04/2024         BUN Annually Lab Results   Component Value Date    BUN 11 06/04/2024       Drug Serum Conc Annually No results found for: \"DIGOXIN\", \"DIG\", \"VALP\"

## 2024-06-20 ENCOUNTER — HOSPITAL ENCOUNTER (OUTPATIENT)
Dept: BONE DENSITY | Age: 84
Discharge: HOME | End: 2024-06-20
Attending: INTERNAL MEDICINE
Payer: MEDICARE

## 2024-06-20 DIAGNOSIS — M85.80 OSTEOPENIA, UNSPECIFIED LOCATION: ICD-10-CM

## 2024-06-20 PROCEDURE — 77080 DXA BONE DENSITY AXIAL: CPT | Performed by: INTERNAL MEDICINE

## 2024-06-26 DIAGNOSIS — I10 ESSENTIAL HYPERTENSION: Chronic | ICD-10-CM

## 2024-06-26 RX ORDER — METOPROLOL SUCCINATE 100 MG/1
100 TABLET, EXTENDED RELEASE ORAL DAILY
Qty: 90 TABLET | Refills: 3 | Status: SHIPPED | OUTPATIENT
Start: 2024-06-26

## 2024-06-26 NOTE — TELEPHONE ENCOUNTER
Requesting    Name from pharmacy: METOPROLOL ER SUCCINATE 100MG TABS         Will file in chart as: METOPROLOL SUCCINATE  MG Oral Tablet 24 Hr    Sig: TAKE 1 TABLET(100 MG) BY MOUTH DAILY    Disp: 90 tablet    Refills: 3 (Pharmacy requested: Not specified)    Start: 6/26/2024    Class: Normal    Non-formulary For: Essential hypertension    Last ordered: 1 year ago (6/21/2023) by Shelby Garduno MD    Last refill: 2/5/2024    Rx #: [id number]    Hypertension Medications Protocol [id number]/26/2024 12:21 PM   Protocol Details CMP or BMP in past 12 months    Last BP reading less than 140/90    In person appointment or virtual visit in the past 12 mos or appointment in next 3 mos    EGFRCR or GFRNAA > 50      To be filled at: [facility] #[id number] [place], [place] - [address] [facility] [address], [phone], [phone]        LOV: 09/26/23 w/ DV  RTC: No Follow Up on File   Last Relevant Labs: 06/04/24  Filled: 06/21/23 #90 with 3 refills    Future Appointments   Date Time Provider Department Center   7/8/2024  9:20 AM Shelby Yarbrough MD [facility] [place]

## 2024-07-06 ENCOUNTER — LAB ENCOUNTER (OUTPATIENT)
Dept: LAB | Age: 84
End: 2024-07-06
Attending: INTERNAL MEDICINE
Payer: MEDICARE

## 2024-07-06 DIAGNOSIS — D75.89 INCREASED MCV: ICD-10-CM

## 2024-07-06 DIAGNOSIS — E03.8 OTHER SPECIFIED HYPOTHYROIDISM: Chronic | ICD-10-CM

## 2024-07-06 LAB
FOLATE SERPL-MCNC: 62 NG/ML (ref 8.7–?)
IRON SATN MFR SERPL: 27 %
IRON SERPL-MCNC: 91 UG/DL
T4 FREE SERPL-MCNC: 1.5 NG/DL (ref 0.8–1.7)
TIBC SERPL-MCNC: 343 UG/DL (ref 250–425)
TRANSFERRIN SERPL-MCNC: 230 MG/DL (ref 250–380)
TSI SER-ACNC: 8.79 MIU/ML (ref 0.55–4.78)
VIT B12 SERPL-MCNC: 973 PG/ML (ref 211–911)

## 2024-07-06 PROCEDURE — 84466 ASSAY OF TRANSFERRIN: CPT

## 2024-07-06 PROCEDURE — 84443 ASSAY THYROID STIM HORMONE: CPT

## 2024-07-06 PROCEDURE — 82607 VITAMIN B-12: CPT

## 2024-07-06 PROCEDURE — 84439 ASSAY OF FREE THYROXINE: CPT

## 2024-07-06 PROCEDURE — 83540 ASSAY OF IRON: CPT

## 2024-07-06 PROCEDURE — 82746 ASSAY OF FOLIC ACID SERUM: CPT

## 2024-07-06 PROCEDURE — 36415 COLL VENOUS BLD VENIPUNCTURE: CPT

## 2024-07-08 ENCOUNTER — OFFICE VISIT (OUTPATIENT)
Dept: INTERNAL MEDICINE CLINIC | Facility: CLINIC | Age: 84
End: 2024-07-08
Payer: MEDICARE

## 2024-07-08 VITALS
RESPIRATION RATE: 15 BRPM | SYSTOLIC BLOOD PRESSURE: 130 MMHG | HEART RATE: 50 BPM | DIASTOLIC BLOOD PRESSURE: 76 MMHG | TEMPERATURE: 98 F | BODY MASS INDEX: 25 KG/M2 | OXYGEN SATURATION: 98 % | WEIGHT: 134.38 LBS

## 2024-07-08 DIAGNOSIS — E03.8 OTHER SPECIFIED HYPOTHYROIDISM: Chronic | ICD-10-CM

## 2024-07-08 DIAGNOSIS — D75.89 INCREASED MCV: ICD-10-CM

## 2024-07-08 DIAGNOSIS — M25.561 RIGHT KNEE PAIN, UNSPECIFIED CHRONICITY: ICD-10-CM

## 2024-07-08 DIAGNOSIS — M85.80 OSTEOPENIA, UNSPECIFIED LOCATION: Primary | ICD-10-CM

## 2024-07-08 DIAGNOSIS — M17.11 OSTEOARTHRITIS OF RIGHT KNEE, UNSPECIFIED OSTEOARTHRITIS TYPE: ICD-10-CM

## 2024-07-08 DIAGNOSIS — E03.8 OTHER SPECIFIED HYPOTHYROIDISM: Primary | Chronic | ICD-10-CM

## 2024-07-08 DIAGNOSIS — I10 PRIMARY HYPERTENSION: Chronic | ICD-10-CM

## 2024-07-08 RX ORDER — ALENDRONATE SODIUM 35 MG/1
35 TABLET ORAL
Qty: 12 TABLET | Refills: 3 | Status: SHIPPED | OUTPATIENT
Start: 2024-07-08

## 2024-07-08 NOTE — PATIENT INSTRUCTIONS
-recheck thyroid labs in 2 months     -if the blood pressure is persistently above 140s/80s then increase lisinopril to 20mg daily and call/follow-up.

## 2024-07-08 NOTE — PROGRESS NOTES
Subjective:   Gerda Crowder is a 83 year old female  who presents for Follow - Up     Hypothyroidism- labs improved with consistent levothyroxine     Osteopenia-to continue supplements. To add alendronate.   Recheck DEXA in 1 year.     HTN- borderline. At home SBP has been above 130s recently.   Denies cp/sob.  Reports taking medication.    Knee discomfort/OA- last xray 8/2022. Pt complaining of discomfort intermittently. Used to get injections.     History/Other:    Chief Complaint Reviewed and Verified  No Further Nursing Notes to   Review  Tobacco Reviewed  Allergies Reviewed  Medications Reviewed  OB   Status Reviewed         Current Outpatient Medications   Medication Sig Dispense Refill    METOPROLOL SUCCINATE  MG Oral Tablet 24 Hr TAKE 1 TABLET(100 MG) BY MOUTH DAILY 90 tablet 3    aspirin (ASPIRIN 81) 81 MG Oral Tab EC Take 1 tablet (81 mg total) by mouth daily. 90 tablet 3    levothyroxine 75 MCG Oral Tab Take 1 tablet (75 mcg total) by mouth before breakfast. 90 tablet 1    LISINOPRIL 10 MG Oral Tab TAKE 1 TABLET(10 MG) BY MOUTH DAILY 90 tablet 0    Meloxicam 7.5 MG Oral Tab Take 1 tablet (7.5 mg total) by mouth daily. 90 tablet 0    atorvastatin 10 MG Oral Tab Take 1 tablet (10 mg total) by mouth every morning. 90 tablet 3    Turmeric (QC TUMERIC COMPLEX OR) Take by mouth.      Vitamin D3, Cholecalciferol, 25 MCG (1000 UT) Oral Tab Take 1 tablet (1,000 Units total) by mouth daily.      Ascorbic Acid (VITAMIN C) 100 MG Oral Tab Take 1 tablet (100 mg total) by mouth daily.      Multiple Vitamin (MULTIVITAMIN ADULT) Oral Tab Take by mouth daily.      Glucosamine-Chondroit-Vit C-Mn (GLUCOSAMINE-CHONDROITIN) Oral Tab Take 1 tablet by mouth 2 (two) times a day.      Omega-3 Fatty Acids (FISH OIL OR) Take by mouth daily.         Review of Systems:  Pertinent items are noted in HPI.  A comprehensive 10 point review of systems was completed.  Pertinent positives and negatives noted in the the  HPI.        Objective:   /76 (BP Location: Left arm, Patient Position: Sitting, Cuff Size: adult)   Pulse 50   Temp 97.9 °F (36.6 °C) (Temporal)   Resp 15   Wt 134 lb 6.4 oz (61 kg)   SpO2 98%   BMI 24.98 kg/m²  Estimated body mass index is 24.98 kg/m² as calculated from the following:    Height as of 6/12/24: 5' 1.5\" (1.562 m).    Weight as of this encounter: 134 lb 6.4 oz (61 kg).  PHYSICAL EXAM:   General: no acute distress   Respiratory: no increased work of breathing; good air exchange; CTAB; no crackles or wheezing   Cardiovascular: RRR; S1, S2; no murmurs;  Neurological: awake, alert, oriented x3; CNII-XII grossly intact;   Behavioral/Psych: euthymic; appropriate affect       Assessment & Plan:   Gerda was seen today for follow - up.    Diagnoses and all orders for this visit:    Osteopenia, unspecified location  -     alendronate 35 MG Oral Tab; Take 1 tablet (35 mg total) by mouth every 7 days.  -supplements  -light exercise as tolerated  -repeat DEXA 7/2025    Right knee pain, unspecified chronicity   Osteoarthritis of right knee, unspecified osteoarthritis type  -     XR Knee (non-replaced) right complete (4 or more views) - [facility] Consult Only; Future  -     Ortho Referral - In Network    Primary hypertension  -as above  -metoprolol  -lisinopril- increase to 20mg daily if SBP persistently above 140s/80s. Precautions discussed  -close monitoring     Other specified hypothyroidism-improved   -continue levothyroxine  -repeat thyroid labs in 8 weeks             Shelby Garduno MD

## 2024-07-09 ENCOUNTER — TELEPHONE (OUTPATIENT)
Dept: INTERNAL MEDICINE CLINIC | Facility: CLINIC | Age: 84
End: 2024-07-09

## 2024-07-09 NOTE — TELEPHONE ENCOUNTER
Inocente/patient son   [phone]    Reporting patient bp 187/82 at time of call  Took 10mg Lisinopril at approximately 8am  BP around 9a.m. was 171/76, patient took another 10mg Lisinopril as instructed at OV 7/8/24  Endorses Headache, slight dizziness, no vision disturbances/double vision

## 2024-07-09 NOTE — TELEPHONE ENCOUNTER
Spoke with patient and son to discuss recommendations per Dr. Richter to go to the ER as she is experiencing high blood pressure even with the 20mg of lisinopril and a headache. Patient and son verbalized understanding and will go to ER to be evaluated due to high blood pressure and headache, as that is high risk for a stroke.

## 2024-07-09 NOTE — TELEPHONE ENCOUNTER
Spoke with patient and patient's son (not on verbal release, asked patient for verbal OK to speak with him as well). At 8AM patient took one 10mg lisinopril and metoprolol. BP at 0953 it was 171/76. Second dose of lisinopril 10mg taken at 10AM, rechecked /78. BP at 1120 was 200/91. Patient has a headache, a little bit of dizziness, no lightheadedness, no LOC, no chest pain, no shortness of breath, no double vision, blurriness. This RN verbalized that patient should be taken to ED if her systolic BP is over 180's and diastolic BP is over 100's persistently, having chest pain, shortness of breath, blurry vision. Patient and son verbalized understanding.      DV: Pt has taken 20mg of lisinopril today for BP above 140. This RN discussed that if patient has any chest pain, shortness of breath, blurry vision, to go to ER. Any further recs? Please advise. TY!

## 2024-07-11 ENCOUNTER — TELEPHONE (OUTPATIENT)
Dept: INTERNAL MEDICINE CLINIC | Facility: CLINIC | Age: 84
End: 2024-07-11

## 2024-07-11 DIAGNOSIS — I1A.0 RESISTANT HYPERTENSION: Primary | ICD-10-CM

## 2024-07-11 NOTE — TELEPHONE ENCOUNTER
Pt was at [facility] on 07/09 to 07/10 due to high BP.     BP: 169/85     Soonest availability with provider is 07/30/24.     Please advised if pt can be seen sooner, pt requesting primary provider only.     # [phone]

## 2024-07-11 NOTE — TELEPHONE ENCOUNTER
Agree with ER recs for increasing lisinopril to 20mg BID if BP persistently above 150s/90s.  Should keep log of BP and send update on Monday or sooner prn.   Please schedule for next available resp slot- there's one for next Thursday 7/18

## 2024-07-11 NOTE — TELEPHONE ENCOUNTER
Lmtcb #1 to triage further.    DV: unable to triage, should pt be seen sooner with different provider? ER notes in care everywhere.

## 2024-07-11 NOTE — TELEPHONE ENCOUNTER
Spoke with pt. Informed of recs below. Set up f/u OV.  Pt v/u    Future Appointments   Date Time Provider Department Center   7/18/2024 10:00 AM Shelby Yarbrough MD [facility] [place]   9/10/2024 11:00 AM Shelby Yarbrough MD [facility] [place]

## 2024-07-11 NOTE — TELEPHONE ENCOUNTER
S/w pt..   She stated she's feeling much better today. No HA's. Overall feels well.  BP reading this mornin/72  Pt took 20 mg Lisinopril this morning with 100 mg metoprolol (as directed by ER).     Pt took BP again otp, 157/74, HR 61.  Pt is wondering if she should take another 20 mg lisinopril this afternoon?   -also when should pt come in for HFU appt? Alternate provider?     ER notes:   Starting tomorrow morning increase your lisinopril dose to 20 mg in the morning and continue taking the same dose of metoprolol. If after a few hours your blood pressure is still above 150/100 you may take a 2nd dose of lisinopril 20 mg. If after 2 hours your blood pressure is still elevated please call your primary care doctor for further directions

## 2024-07-12 RX ORDER — HYDRALAZINE HYDROCHLORIDE 25 MG/1
TABLET, FILM COATED ORAL
Qty: 270 TABLET | Refills: 0 | OUTPATIENT
Start: 2024-07-12

## 2024-07-12 RX ORDER — LISINOPRIL 40 MG/1
40 TABLET ORAL DAILY
Qty: 90 TABLET | Refills: 0 | Status: SHIPPED | OUTPATIENT
Start: 2024-07-12

## 2024-07-12 RX ORDER — HYDRALAZINE HYDROCHLORIDE 25 MG/1
25 TABLET, FILM COATED ORAL 2 TIMES DAILY
Qty: 180 TABLET | Refills: 0 | Status: SHIPPED | OUTPATIENT
Start: 2024-07-12

## 2024-07-12 NOTE — TELEPHONE ENCOUNTER
I will send in a script for lisinopril 40mg tablet to replace the 10mg tab that she was using to take 40mg daily.  Should take lisinopril 40mg daily in the morning with metoprolol.   I will add hydralazine; can start with 25mg BID and increase to TID if BP is still above 150s/80s. Close monitoring of BP and HR.   Given new difficulty with controlling BP, I have placed orders for metanephrine testing and renal artery testing (US).   I will also repeat thyroid labs.  *I added TTE as well  Close follow-up. Please ask her to bring in all her medications to upcoming appt.

## 2024-07-12 NOTE — TELEPHONE ENCOUNTER
S/w daughter and pt. Relayed recs below to them. They v/u, already scheduled imaging and ECHO. Will get labs done next week. And v/u w new meds.

## 2024-07-12 NOTE — TELEPHONE ENCOUNTER
S/w Antonietta, daughter. Daughter stated she is a nurse practitioner  She stated her mom is still having elevated BP readings even after following protocol of 2nd dose of lisinopril. She is wondering if something like hydralazine could be added PRN?   Daughter stated last night at 9 pm pt had a BP of 185/71. Pt was asymptomatic at the time.   This morning pt took her BP at 9am 180/79.   Daughter doesn't want to keep taking pt to ER. Daughter is concerned that it's the weekend and her mom keeps having elevated BP readings even with 2nd dose of lisinopril.      DVF- Please advise if anything can be added for pt until 7/18 appt? Or further recs?

## 2024-07-12 NOTE — TELEPHONE ENCOUNTER
Pt's daughter Amirah is calling requesting new medication for mother.     Amirah is requesting sooner HFU apt.   Future Appointments   Date Time Provider Department Center   7/18/2024 10:00 AM Shelby Yarbrough MD [facility] [place]   9/10/2024 11:00 AM Shelby Yarbrough MD [facility] [place]     BP:   180/70 mins ago after taking meds after waking up/eating a little bit.       Amirah is requesting call back: [phone]

## 2024-07-15 ENCOUNTER — TELEPHONE (OUTPATIENT)
Dept: ORTHOPEDICS CLINIC | Facility: CLINIC | Age: 84
End: 2024-07-15

## 2024-07-15 NOTE — TELEPHONE ENCOUNTER
Patient is scheduled for right knee pain. Please advise if imaging is needed.  Future Appointments   Date Time Provider Department Center   7/18/2024 10:00 AM Shelby Yarbrough MD [facility] [place]   7/31/2024  8:45 AM PF US RM2 PF US [place]   7/31/2024 10:45 AM PF CARD STRESS ECHO RM 1 PF CARD [place]   9/10/2024 11:00 AM Shelby Yarbrough MD [facility] [place]   9/11/2024 11:20 AM Erick Kaur MD [facility]

## 2024-07-15 NOTE — TELEPHONE ENCOUNTER
An xray has already been ordered by PCP- please call patient to schedule~ thanks! (It is 's protocol ordered)

## 2024-07-18 ENCOUNTER — TELEPHONE (OUTPATIENT)
Dept: INTERNAL MEDICINE CLINIC | Facility: CLINIC | Age: 84
End: 2024-07-18

## 2024-07-18 ENCOUNTER — OFFICE VISIT (OUTPATIENT)
Dept: INTERNAL MEDICINE CLINIC | Facility: CLINIC | Age: 84
End: 2024-07-18
Payer: MEDICARE

## 2024-07-18 VITALS
OXYGEN SATURATION: 98 % | DIASTOLIC BLOOD PRESSURE: 72 MMHG | SYSTOLIC BLOOD PRESSURE: 130 MMHG | HEART RATE: 71 BPM | RESPIRATION RATE: 15 BRPM | WEIGHT: 132.63 LBS | TEMPERATURE: 98 F | BODY MASS INDEX: 25 KG/M2

## 2024-07-18 DIAGNOSIS — I10 HYPERTENSION, UNSPECIFIED TYPE: Primary | ICD-10-CM

## 2024-07-18 DIAGNOSIS — E03.8 OTHER SPECIFIED HYPOTHYROIDISM: Chronic | ICD-10-CM

## 2024-07-18 RX ORDER — HYDRALAZINE HYDROCHLORIDE 25 MG/1
25 TABLET, FILM COATED ORAL 2 TIMES DAILY PRN
COMMUNITY
Start: 2024-07-18

## 2024-07-18 NOTE — PROGRESS NOTES
Subjective:   Gerda Crowder is a 83 year old female  who presents for Blood Pressure (Follow Up )     HTN- has been uncontrolled recently. Unclear as to why.   Now on lisinopril 40mg, metoprolol 100mg daily  And hydralzine prn. It is now much better controlled.   For further evaluation US kidney dopplers were ordered and TTE was ordered with repeat labs.   Denies cp/sob/palpitations/swelling    Hypothyroidism- improved on labs from 7/6/24. On levothyroxine.     History/Other:    Chief Complaint Reviewed and Verified  Nursing Notes Reviewed and   Verified  Tobacco Reviewed  Allergies Reviewed  Medications Reviewed    OB Status Reviewed         Current Outpatient Medications   Medication Sig Dispense Refill    hydrALAZINE 25 MG Oral Tab Take 1 tablet (25 mg total) by mouth 2 (two) times daily as needed. If the blood pressure is still above 150s/80s after taking the medication, then increase hydralazine to 25mg three times a day (every 8 hours) and call the office.      lisinopril 40 MG Oral Tab Take 1 tablet (40 mg total) by mouth daily. 90 tablet 0    alendronate 35 MG Oral Tab Take 1 tablet (35 mg total) by mouth every 7 days. 12 tablet 3    METOPROLOL SUCCINATE  MG Oral Tablet 24 Hr TAKE 1 TABLET(100 MG) BY MOUTH DAILY 90 tablet 3    aspirin (ASPIRIN 81) 81 MG Oral Tab EC Take 1 tablet (81 mg total) by mouth daily. 90 tablet 3    levothyroxine 75 MCG Oral Tab Take 1 tablet (75 mcg total) by mouth before breakfast. 90 tablet 1    Meloxicam 7.5 MG Oral Tab Take 1 tablet (7.5 mg total) by mouth daily. 90 tablet 0    atorvastatin 10 MG Oral Tab Take 1 tablet (10 mg total) by mouth every morning. 90 tablet 3    Turmeric (QC TUMERIC COMPLEX OR) Take by mouth.      Vitamin D3, Cholecalciferol, 25 MCG (1000 UT) Oral Tab Take 1 tablet (1,000 Units total) by mouth daily.      Ascorbic Acid (VITAMIN C) 100 MG Oral Tab Take 1 tablet (100 mg total) by mouth daily.      Multiple Vitamin (MULTIVITAMIN ADULT) Oral  Tab Take by mouth daily.      Glucosamine-Chondroit-Vit C-Mn (GLUCOSAMINE-CHONDROITIN) Oral Tab Take 1 tablet by mouth 2 (two) times a day.      Omega-3 Fatty Acids (FISH OIL OR) Take by mouth daily.         Review of Systems:  Pertinent items are noted in HPI.  A comprehensive 10 point review of systems was completed.  Pertinent positives and negatives noted in the the HPI.        Objective:   /72   Pulse 71   Temp 98 °F (36.7 °C) (Temporal)   Resp 15   Wt 132 lb 9.6 oz (60.1 kg)   SpO2 98%   BMI 24.65 kg/m²  Estimated body mass index is 24.65 kg/m² as calculated from the following:    Height as of 6/12/24: 5' 1.5\" (1.562 m).    Weight as of this encounter: 132 lb 9.6 oz (60.1 kg).  PHYSICAL EXAM:   General: no acute distress   Respiratory: no increased work of breathing; good air exchange; CTAB; no crackles or wheezing   Cardiovascular: RRR; S1, S2; no murmurs;no edema   Neurological: awake, alert, oriented x3; CNII-XII grossly intact;  Behavioral/Psych: euthymic; appropriate affect       Assessment & Plan:   Gerda was seen today for blood pressure.    Diagnoses and all orders for this visit:    Hypertension, unspecified type  -continue lisinopril and metoprolol scheduled  -hydralazine BID prn  -close monitoring  -check for secondary causes-imaging and repeat labs   -close follow-up    Other specified hypothyroidism  -improved from prior  -levothyroxine  -repeat labs.               Shelby Garduno MD

## 2024-07-18 NOTE — TELEPHONE ENCOUNTER
Patient called in with third party [employer].     Both parties requesting for office to fax patients last apt visit with any additional comments/plan of action provider has shared.     Patient states insurance needs it for their records.     FAX# [phone]     Please advise.     # [phone]

## 2024-07-19 NOTE — TELEPHONE ENCOUNTER
Should be fine to send   [FreeTextEntry1] : Medical record entries made by the scribe today, were at my direction and personally dictated to them by me, Dr. Erika Espinoza on 05/15/2023.  I have reviewed the chart and agree that the record accurately reflects my personal performance of the history, physical exam, assessment and plan.\par \par Phill CABRERA acting as scribe for Dr. Erika Espinoza MD on 05/15/2023 at 11:57 AM.\par \par \par \par \par \par

## 2024-07-25 ENCOUNTER — TELEPHONE (OUTPATIENT)
Dept: INTERNAL MEDICINE CLINIC | Facility: CLINIC | Age: 84
End: 2024-07-25

## 2024-07-25 NOTE — TELEPHONE ENCOUNTER
7:33 134/74 Hydralazine was taken here.     11:25 153/72- on repeat, patient did not take hydralazine because it had not been 8 hours.    Advised patient to recheck b/p now and then take hydralazine if b/p still above 150/80. Patient v/u.     Please clarify should patient only take hydralazine if 150/80? Patient under impression DV advised to take if above 135. Which is why she took a dose this morning.

## 2024-07-25 NOTE — TELEPHONE ENCOUNTER
The SBP parameters  of 150 were if she had already had been taking hydralazine BID and we were going to add a scheduled 3rd dose.     She can take hydralazine if BP greater than/equal to 135. If she is needing hydralazine daily then should just take it scheduled daily. If BP is still not controlled (SBP persistently above 135) with once daily hydralazine 25mg (along with her other medications) then add second dose of hydralazine 25mg

## 2024-07-25 NOTE — TELEPHONE ENCOUNTER
Pt would like a call back has some questions on medication below as hypertension is 153/76 and feels light headed.

## 2024-07-31 ENCOUNTER — HOSPITAL ENCOUNTER (OUTPATIENT)
Dept: ULTRASOUND IMAGING | Age: 84
Discharge: HOME | End: 2024-07-31
Attending: INTERNAL MEDICINE
Payer: MEDICARE

## 2024-07-31 ENCOUNTER — HOSPITAL ENCOUNTER (OUTPATIENT)
Dept: CV DIAGNOSTICS | Age: 84
Discharge: HOME | End: 2024-07-31
Attending: INTERNAL MEDICINE
Payer: MEDICARE

## 2024-07-31 DIAGNOSIS — I1A.0 RESISTANT HYPERTENSION: ICD-10-CM

## 2024-07-31 PROCEDURE — 93306 TTE W/DOPPLER COMPLETE: CPT | Performed by: INTERNAL MEDICINE

## 2024-07-31 PROCEDURE — 93975 VASCULAR STUDY: CPT | Performed by: INTERNAL MEDICINE

## 2024-07-31 PROCEDURE — 76775 US EXAM ABDO BACK WALL LIM: CPT | Performed by: INTERNAL MEDICINE

## 2024-08-03 DIAGNOSIS — I10 PRIMARY HYPERTENSION: Chronic | ICD-10-CM

## 2024-08-05 RX ORDER — LISINOPRIL 10 MG/1
10 TABLET ORAL DAILY
Qty: 90 TABLET | Refills: 0 | OUTPATIENT
Start: 2024-08-05

## 2024-08-05 NOTE — TELEPHONE ENCOUNTER
Requesting    Name from pharmacy: LISINOPRIL 10MG TABLETS         Will file in chart as: LISINOPRIL 10 MG Oral Tab    The original prescription was discontinued on 7/12/2024 by Shebly Yarbrough MD for the following reason: Dose adjustment. Renewing this prescription may not be appropriate.    Sig: TAKE 1 TABLET(10 MG) BY MOUTH DAILY    Disp: 90 tablet    Refills: 0 (Pharmacy requested: Not specified)    Start: 8/3/2024    Class: Normal    Non-formulary For: Primary hypertension    Last ordered: 2 months ago (5/30/2024) by Shelby Garduno MD    Last refill: 5/30/2024    Rx #: [id number]    Hypertension Medications Protocol [id number]/03/2024 05:13 AM   Protocol Details CMP or BMP in past 12 months    Last BP reading less than 140/90    In person appointment or virtual visit in the past 12 mos or appointment in next 3 mos    EGFRCR or GFRNAA > 50      To be filled at: [email or web address] #[id number] - [facility] [address] AT [facility] [address], [phone], [phone]        LOV: 07/18/24 w/ DVF  RTC: No Follow Up on File   Last Relevant Labs: 07/06/24  Filled: 07/12/24 #90 with 0 refills    Future Appointments   Date Time Provider Department Center   9/10/2024 11:00 AM Shelby Yarbrough MD [facility] [place]   9/11/2024 11:20 AM Erick Kaur MD [facility]

## 2024-08-20 ENCOUNTER — TELEMEDICINE (OUTPATIENT)
Dept: INTERNAL MEDICINE CLINIC | Facility: CLINIC | Age: 84
End: 2024-08-20
Payer: MEDICARE

## 2024-08-20 DIAGNOSIS — I10 HYPERTENSION, UNSPECIFIED TYPE: Primary | ICD-10-CM

## 2024-08-20 PROCEDURE — 99214 OFFICE O/P EST MOD 30 MIN: CPT | Performed by: INTERNAL MEDICINE

## 2024-08-20 RX ORDER — LISINOPRIL 40 MG/1
40 TABLET ORAL NIGHTLY
COMMUNITY
Start: 2024-08-20

## 2024-08-20 NOTE — PROGRESS NOTES
Subjective:   Gerda Crowder is a 84 year old female who presents for the following:     HTN-has been on lisinopril, metoprolol consistently and has been needing hydralazine often. To start taking hydralazine scheduled since without it her SBP usually goes above 130s.    History/Other:    No Further Nursing Notes to Review         Current Outpatient Medications   Medication Sig Dispense Refill    lisinopril 40 MG Oral Tab Take 1 tablet (40 mg total) by mouth at bedtime.      hydrALAZINE 25 MG Oral Tab Take 1 tablet (25 mg total) by mouth As Directed. Take 1 tablet with breakfast and 1 tablet with dinner. If the blood pressure is still above 140/80 after taking all medications, then ok to take 1 extra dose of hydralazine      alendronate 35 MG Oral Tab Take 1 tablet (35 mg total) by mouth every 7 days. 12 tablet 3    METOPROLOL SUCCINATE  MG Oral Tablet 24 Hr TAKE 1 TABLET(100 MG) BY MOUTH DAILY 90 tablet 3    aspirin (ASPIRIN 81) 81 MG Oral Tab EC Take 1 tablet (81 mg total) by mouth daily. 90 tablet 3    levothyroxine 75 MCG Oral Tab Take 1 tablet (75 mcg total) by mouth before breakfast. 90 tablet 1    Meloxicam 7.5 MG Oral Tab Take 1 tablet (7.5 mg total) by mouth daily. 90 tablet 0    atorvastatin 10 MG Oral Tab Take 1 tablet (10 mg total) by mouth every morning. 90 tablet 3    Turmeric (QC TUMERIC COMPLEX OR) Take by mouth.      Vitamin D3, Cholecalciferol, 25 MCG (1000 UT) Oral Tab Take 1 tablet (1,000 Units total) by mouth daily.      Ascorbic Acid (VITAMIN C) 100 MG Oral Tab Take 1 tablet (100 mg total) by mouth daily.      Multiple Vitamin (MULTIVITAMIN ADULT) Oral Tab Take by mouth daily.      Glucosamine-Chondroit-Vit C-Mn (GLUCOSAMINE-CHONDROITIN) Oral Tab Take 1 tablet by mouth 2 (two) times a day.      Omega-3 Fatty Acids (FISH OIL OR) Take by mouth daily.         Review of Systems:  Pertinent items are noted in HPI.  A comprehensive 10 point review of systems was completed.  Pertinent  positives and negatives noted in the the HPI.        Objective:   There were no vitals taken for this visit. Estimated body mass index is 24.65 kg/m² as calculated from the following:    Height as of 6/12/24: 5' 1.5\" (1.562 m).    Weight as of 7/18/24: 132 lb 9.6 oz (60.1 kg).  PHYSICAL EXAM:   General:  no acute distress   Respiratory: no increased work of breathing; r  Neurological: awake, alert, oriented x3; CNII-XII grossly intact;   Behavioral/Psych: euthymic; appropriate affect     Assessment & Plan:     Diagnoses and all orders for this visit:    Hypertension, unspecified type  -take lisinopril 40mg nightly  -metoprolol in the morning   -hydralazine 25mg scheduled BID (ok to take extra dose if BP consistently above 140s/80s despite above)   -close monitoring and follow-up -to send BP readings this week or call sooner prn        This visit is conducted using Telemedicine with live, interactive video and audio.    Patient has been referred to the [facility] website at [email or web address] to review the yearly Consent to Treat document.    Patient understands and accepts financial responsibility for any deductible, co-insurance and/or co-pays associated with this service.    Shelby Garduno MD

## 2024-08-26 ENCOUNTER — TELEPHONE (OUTPATIENT)
Dept: INTERNAL MEDICINE CLINIC | Facility: CLINIC | Age: 84
End: 2024-08-26

## 2024-08-26 NOTE — TELEPHONE ENCOUNTER
Yes, ok to take hydralazine in the middle of the day if BP above 140s/80s.  Continue to closely monitor BP.  (Hold morning medication if SBP less than 100)

## 2024-08-26 NOTE — TELEPHONE ENCOUNTER
Spoke with patient to discuss questions about BP. Pt states that she has a headache when her BP is low and when it is high. Denies any dizziness, lightheadedness, loss of consciousness. Last night, pt chewed a piece of garlic to help bring her BP down and that it made her feel better. Pt confirmed that she is taking medication as directed, but pt questioned if it'd be okay to use her extra dose of hydralazine in the middle of the day if her BP is above 140 or to only save it for after dinner dose.     DVF: Pt sent in BP log after med change. Pt states she is having headaches with high and low blood pressures. Denies any dizziness, lightheadedness, loss of consciousness. This RN recommended pt to continue taking blood pressure medication as directed, to be seen in ER if pt starts to experience headaches with high blood pressures. Pt v/u, but would like to avoid that. Pt questioned if it'd be OK to use extra dose of hydralazine in middle of the day if her BP is above 140 or to only save it for after dinner dose. Please advise, TY!     BP LOG BELOW:    8/21: 115/68 Pulse: 60, 129/61 Pulse:59, 141/77 Pulse: 62, 144/61 Pulse: 59.      8/22: 7:58am: 121/62 Pulse: 55, 9:01am 134/72 Pulse:58, 12:17pm 130/68 Pulse: 64, 1:24pm 101/60 Pulse: 59.     8/23: 7:11am 141/73 Pulse: 60, 11:56am 136/61 Pulse: 62, 6:02pm 161/68 Pulse: 59, 8:58pm 171/75 Pulse: 67.     8/24: 8:03am 91/56 Pulse: 61. 8:07am 96/57 Pulse: 58, 8:39am 100/63 Pulse: 60, 9:23am 115/61 Pulse: 60, 10:12am 130/66 Pulse: 60, 1:03pm 162/61 Pulse: 59, 1:20pm 125/56 Pulse: 59, 3:20pm 129/66 Pulse: 63.     8/25: 7AM 83/50 Pulse: 58, 7:58am (metoprolol) 101/53 Pulse: 56, 9:40am 93/58 pulse: 58, 10:22am 101/55 Pulse: 56, 1:48pm 124/55 Pulse 59, 5:34pm 157/74 Pulse: 59. (Pt took hydralazine and lisinopril at 5:34pm) 157/74 Pulse: 59. 9:20pm 161/76 Pulse: 62. (Pt took another hydralazine because BP high and fell asleep.         8/26: 7:57am 135/69 Pulse: 56, 9:58am 120/61.

## 2024-08-26 NOTE — TELEPHONE ENCOUNTER
Pt calling to provide BP readings since medication change. Pt is worried because in the morning BP is low and evening is high. Unsure if it's because of food she's eating? Last night she ate cereal and an apple. Pt denies being stressed. Pt says she had headaches whenever her BP is high. She had a piece of garlic to try and help with BP.    Pt requesting a call from triage to discuss BP concerns.     8/21: 115/68 Pulse: 60, 129/61 Pulse:59, 141/77 Pulse: 62, 144/61 Pulse: 59.     8/22: 7:58am: 121/62 Pulse: 55, 9:01am 134/72 Pulse:58, 12:17pm 130/68 Pulse: 64, 1:24pm 101/60 Pulse: 59.    8/23: 7:11am 141/73 Pulse: 60, 11:56am 136/61 Pulse: 62, 6:02pm 161/68 Pulse: 59, 8:58pm 171/75 Pulse: 67.    8/24: 8:03am 91/56 Pulse: 61. 8:07am 96/57 Pulse: 58, 8:39am 100/63 Pulse: 60, 9:23am 115/61 Pulse: 60, 10:12am 130/66 Pulse: 60, 1:03pm 162/61 Pulse: 59, 1:20pm 125/56 Pulse: 59, 3:20pm 129/66 Pulse: 63.    8/25: 7AM 83/50 Pulse: 58, 7:58am (metoprolol) 101/53 Pulse: 56, 9:40am 93/58 pulse: 58, 10:22am 101/55 Pulse: 56, 1:48pm 124/55 Pulse 59, 5:34pm 157/74 Pulse: 59. (Pt took hydralazine and lisinopril at 5:34pm) 157/74 Pulse: 59. 9:20pm 161/76 Pulse: 62. (Pt took another hydralazine because BP high and fell asleep.       8/26: 7:57am 135/69 Pulse: 56, 9:58am 120/61.       Future Appointments   Date Time Provider Department Center   9/10/2024 11:00 AM Shelby Yarbrough MD [facility] 8 [facility]   9/11/2024 11:20 AM Erick Kaur MD [facility]

## 2024-09-10 ENCOUNTER — LAB ENCOUNTER (OUTPATIENT)
Dept: LAB | Age: 84
End: 2024-09-10
Attending: INTERNAL MEDICINE
Payer: MEDICARE

## 2024-09-10 ENCOUNTER — OFFICE VISIT (OUTPATIENT)
Dept: INTERNAL MEDICINE CLINIC | Facility: CLINIC | Age: 84
End: 2024-09-10
Payer: MEDICARE

## 2024-09-10 VITALS
HEART RATE: 57 BPM | RESPIRATION RATE: 15 BRPM | TEMPERATURE: 98 F | BODY MASS INDEX: 25 KG/M2 | SYSTOLIC BLOOD PRESSURE: 122 MMHG | WEIGHT: 133.81 LBS | OXYGEN SATURATION: 96 % | DIASTOLIC BLOOD PRESSURE: 64 MMHG

## 2024-09-10 DIAGNOSIS — I1A.0 RESISTANT HYPERTENSION: ICD-10-CM

## 2024-09-10 DIAGNOSIS — R79.89 ABNORMAL TSH: ICD-10-CM

## 2024-09-10 DIAGNOSIS — I10 HYPERTENSION, UNSPECIFIED TYPE: Primary | ICD-10-CM

## 2024-09-10 DIAGNOSIS — E03.8 OTHER SPECIFIED HYPOTHYROIDISM: Chronic | ICD-10-CM

## 2024-09-10 DIAGNOSIS — D75.89 INCREASED MCV: ICD-10-CM

## 2024-09-10 DIAGNOSIS — I51.89 GRADE II DIASTOLIC DYSFUNCTION: ICD-10-CM

## 2024-09-10 LAB
BASOPHILS # BLD AUTO: 0.04 X10(3) UL (ref 0–0.2)
BASOPHILS NFR BLD AUTO: 0.8 %
EOSINOPHIL # BLD AUTO: 0.09 X10(3) UL (ref 0–0.7)
EOSINOPHIL NFR BLD AUTO: 1.8 %
ERYTHROCYTE [DISTWIDTH] IN BLOOD BY AUTOMATED COUNT: 12.6 %
HCT VFR BLD AUTO: 37 %
HGB BLD-MCNC: 12.3 G/DL
IMM GRANULOCYTES # BLD AUTO: 0.01 X10(3) UL (ref 0–1)
IMM GRANULOCYTES NFR BLD: 0.2 %
LYMPHOCYTES # BLD AUTO: 1.42 X10(3) UL (ref 1–4)
LYMPHOCYTES NFR BLD AUTO: 28.5 %
MCH RBC QN AUTO: 34.1 PG (ref 26–34)
MCHC RBC AUTO-ENTMCNC: 33.2 G/DL (ref 31–37)
MCV RBC AUTO: 102.5 FL
MONOCYTES # BLD AUTO: 0.7 X10(3) UL (ref 0.1–1)
MONOCYTES NFR BLD AUTO: 14.1 %
NEUTROPHILS # BLD AUTO: 2.72 X10 (3) UL (ref 1.5–7.7)
NEUTROPHILS # BLD AUTO: 2.72 X10(3) UL (ref 1.5–7.7)
NEUTROPHILS NFR BLD AUTO: 54.6 %
PLATELET # BLD AUTO: 195 10(3)UL (ref 150–450)
RBC # BLD AUTO: 3.61 X10(6)UL
T4 FREE SERPL-MCNC: 1.5 NG/DL (ref 0.8–1.7)
TSI SER-ACNC: 2.7 MIU/ML (ref 0.55–4.78)
WBC # BLD AUTO: 5 X10(3) UL (ref 4–11)

## 2024-09-10 PROCEDURE — 85025 COMPLETE CBC W/AUTO DIFF WBC: CPT

## 2024-09-10 PROCEDURE — 99214 OFFICE O/P EST MOD 30 MIN: CPT | Performed by: INTERNAL MEDICINE

## 2024-09-10 PROCEDURE — 83835 ASSAY OF METANEPHRINES: CPT

## 2024-09-10 PROCEDURE — G2211 COMPLEX E/M VISIT ADD ON: HCPCS | Performed by: INTERNAL MEDICINE

## 2024-09-10 PROCEDURE — 36415 COLL VENOUS BLD VENIPUNCTURE: CPT

## 2024-09-10 PROCEDURE — 84439 ASSAY OF FREE THYROXINE: CPT

## 2024-09-10 PROCEDURE — 84443 ASSAY THYROID STIM HORMONE: CPT

## 2024-09-10 RX ORDER — LISINOPRIL 40 MG/1
40 TABLET ORAL
Qty: 90 TABLET | Refills: 3 | Status: SHIPPED | OUTPATIENT
Start: 2024-09-10

## 2024-09-10 RX ORDER — HYDRALAZINE HYDROCHLORIDE 25 MG/1
25 TABLET, FILM COATED ORAL AS DIRECTED
Qty: 180 TABLET | Refills: 1 | Status: SHIPPED | OUTPATIENT
Start: 2024-09-10

## 2024-09-10 NOTE — PROGRESS NOTES
Subjective:   Gerda Crowder is a 84 year old female  who presents for Follow - Up     HTN-controlled now. Much improved.   Denies cp/angina/sob.  Has seen cardiology in the past for abnormal stress test.   Hypothyroidism- on levothyroxine. Due to check labs. - labs in process.     History/Other:    Chief Complaint Reviewed and Verified  No Further Nursing Notes to   Review  Tobacco Reviewed  Allergies Reviewed  Medications Reviewed  OB   Status Reviewed         Current Outpatient Medications   Medication Sig Dispense Refill    lisinopril 40 MG Oral Tab Take 1 tablet (40 mg total) by mouth daily with dinner. 90 tablet 3    hydrALAZINE 25 MG Oral Tab Take 1 tablet (25 mg total) by mouth As Directed. Take 1 tablet with breakfast and 1 tablet with lunch 180 tablet 1    alendronate 35 MG Oral Tab Take 1 tablet (35 mg total) by mouth every 7 days. 12 tablet 3    METOPROLOL SUCCINATE  MG Oral Tablet 24 Hr TAKE 1 TABLET(100 MG) BY MOUTH DAILY 90 tablet 3    aspirin (ASPIRIN 81) 81 MG Oral Tab EC Take 1 tablet (81 mg total) by mouth daily. 90 tablet 3    levothyroxine 75 MCG Oral Tab Take 1 tablet (75 mcg total) by mouth before breakfast. 90 tablet 1    Meloxicam 7.5 MG Oral Tab Take 1 tablet (7.5 mg total) by mouth daily. 90 tablet 0    atorvastatin 10 MG Oral Tab Take 1 tablet (10 mg total) by mouth every morning. 90 tablet 3    Turmeric (QC TUMERIC COMPLEX OR) Take by mouth.      Vitamin D3, Cholecalciferol, 25 MCG (1000 UT) Oral Tab Take 1 tablet (1,000 Units total) by mouth daily.      Ascorbic Acid (VITAMIN C) 100 MG Oral Tab Take 1 tablet (100 mg total) by mouth daily.      Multiple Vitamin (MULTIVITAMIN ADULT) Oral Tab Take by mouth daily.      Glucosamine-Chondroit-Vit C-Mn (GLUCOSAMINE-CHONDROITIN) Oral Tab Take 1 tablet by mouth 2 (two) times a day.      Omega-3 Fatty Acids (FISH OIL OR) Take by mouth daily.         Review of Systems:  Pertinent items are noted in HPI.  A comprehensive 10 point  review of systems was completed.  Pertinent positives and negatives noted in the the HPI.        Objective:   /64 (BP Location: Left arm, Patient Position: Sitting, Cuff Size: adult)   Pulse 57   Temp 98.2 °F (36.8 °C) (Temporal)   Resp 15   Wt 133 lb 12.8 oz (60.7 kg)   SpO2 96%   BMI 24.87 kg/m²  Estimated body mass index is 24.87 kg/m² as calculated from the following:    Height as of 6/12/24: 5' 1.5\" (1.562 m).    Weight as of this encounter: 133 lb 12.8 oz (60.7 kg).  PHYSICAL EXAM:   General: no acute distress   Respiratory: no increased work of breathing; good air exchange; CTAB; no crackles or wheezing   Cardiovascular: RRR; S1, S2; no murmurs;no edema   Neurological: awake, alert, oriented x3; CNII-XII grossly intact;   MSK: full ROM; strength 5/5  Behavioral/Psych: euthymic; appropriate affect     Assessment & Plan:   Gerda was seen today for follow - up.    Diagnoses and all orders for this visit:    Hypertension  -     lisinopril 40 MG Oral Tab; Take 1 tablet (40 mg total) by mouth daily with dinner.  -     [facility] CARDIOLOGY EXTERNAL  -     hydrALAZINE 25 MG Oral Tab; Take 1 tablet (25 mg total) by mouth As Directed. Take 1 tablet with breakfast and 1 tablet with lunch  -close monitoring     Grade II diastolic dysfunction  -given above and hx of fluctuating BP and prior hx to follow-up with cardiology again.   -     [facility] CARDIOLOGY EXTERNAL    Other specified hypothyroidism  Abnormal TSH  -discussed how to take levothyroxine  -labs pending                   Shelby Garduno MD

## 2024-09-11 ENCOUNTER — OFFICE VISIT (OUTPATIENT)
Dept: ORTHOPEDICS CLINIC | Facility: CLINIC | Age: 84
End: 2024-09-11
Payer: MEDICARE

## 2024-09-11 ENCOUNTER — HOSPITAL ENCOUNTER (OUTPATIENT)
Dept: GENERAL RADIOLOGY | Age: 84
Discharge: HOME | End: 2024-09-11
Attending: ORTHOPAEDIC SURGERY
Payer: MEDICARE

## 2024-09-11 DIAGNOSIS — M25.561 RIGHT KNEE PAIN, UNSPECIFIED CHRONICITY: Primary | ICD-10-CM

## 2024-09-11 DIAGNOSIS — M25.561 RIGHT KNEE PAIN, UNSPECIFIED CHRONICITY: ICD-10-CM

## 2024-09-11 DIAGNOSIS — I10 HYPERTENSION, UNSPECIFIED TYPE: ICD-10-CM

## 2024-09-11 PROCEDURE — 99213 OFFICE O/P EST LOW 20 MIN: CPT | Performed by: ORTHOPAEDIC SURGERY

## 2024-09-11 PROCEDURE — 73564 X-RAY EXAM KNEE 4 OR MORE: CPT | Performed by: ORTHOPAEDIC SURGERY

## 2024-09-11 NOTE — PROGRESS NOTES
[facility] Progress Note    Subjective: Patient returns to clinic after last being seen by me a few years ago, but being seen by Abhi 10 months ago for Zilretta injection.  She is present with son today.  She states that she does not have much pain in the knee, but was instructed to follow-up for evaluation of possible advancement of her arthritis.  She states that she underwent a Zilretta injection prior to a trip to the [facility] and feels this did provide great relief of her symptoms for at least a few months.  She currently takes meloxicam as needed, but does not feel she needs it very much.  She states pain is not a limiting factor for her.    Objective: Patient appears comfortable, very pleasant, present with son.  She demonstrates near full extension of the knee.  There is mild effusion to the knee.      Imaging: Updated x-rays of the right knee from today personally viewed, independently interpreted and radiology report read.  Demonstrates radiographically severe osteoarthritis most pronounced in the medial compartment.  There is tricompartmental joint space narrowing and subchondral sclerosis, large osteophytes most pronounced in the medial compartment.  Compared to x-rays from August 5, 2022 there is slight progression of medial joint space loss and osteophytes formation.      Assessment/Plan: 84-year-old female with mildly symptomatic radiographically moderate to severe osteoarthritis of the right knee.  Revisited the discussion of treatment options for her right knee arthritis.  As she is not having much in the way of symptoms, not reporting pain, advised that we can continue monitoring, injections as needed, anti-inflammatories.  They did ask about some episodes of buckling and giving out, and I did discuss possible brace use or physical therapy.  They would like to proceed with PT and an order has been placed.  If they would like to proceed with injection, in particular Zilretta, advised to  contact the office in order to initiate authorization process.    Erick Kaur MD, FAAOS  [facility]  Phone [phone]  Fax [phone]

## 2024-09-12 RX ORDER — HYDRALAZINE HYDROCHLORIDE 25 MG/1
TABLET, FILM COATED ORAL
Qty: 180 TABLET | Refills: 1 | OUTPATIENT
Start: 2024-09-12

## 2024-09-15 LAB
METANEPHRINE: <25 PG/ML
NORMETANEPHRINE: 44.8 PG/ML

## 2024-09-19 DIAGNOSIS — M17.11 PRIMARY OSTEOARTHRITIS OF RIGHT KNEE: ICD-10-CM

## 2024-09-19 RX ORDER — MELOXICAM 7.5 MG/1
7.5 TABLET ORAL DAILY
Qty: 90 TABLET | Refills: 0 | Status: SHIPPED | OUTPATIENT
Start: 2024-09-19

## 2024-09-19 NOTE — TELEPHONE ENCOUNTER
Meloxicam 7.5 mg  DOS: N/A  Last OV: 09/11/24  Last refill date: 04/10/24     #/refills: 90/0  Upcoming appt: No future appointments.    07/10/24  BUN  7.0 - 25.0 mg/dL 15.4   Creatinine  0.60 - 1.20 mg/dL 0.88     eGFR  mL/min/ 66.1

## 2024-10-09 ENCOUNTER — OFFICE VISIT (OUTPATIENT)
Dept: INTERNAL MEDICINE CLINIC | Facility: CLINIC | Age: 84
End: 2024-10-09
Payer: MEDICARE

## 2024-10-09 VITALS
SYSTOLIC BLOOD PRESSURE: 138 MMHG | TEMPERATURE: 98 F | RESPIRATION RATE: 15 BRPM | DIASTOLIC BLOOD PRESSURE: 78 MMHG | WEIGHT: 132.81 LBS | OXYGEN SATURATION: 99 % | BODY MASS INDEX: 25 KG/M2 | HEART RATE: 57 BPM

## 2024-10-09 DIAGNOSIS — E03.8 OTHER SPECIFIED HYPOTHYROIDISM: Chronic | ICD-10-CM

## 2024-10-09 DIAGNOSIS — I51.89 GRADE II DIASTOLIC DYSFUNCTION: ICD-10-CM

## 2024-10-09 DIAGNOSIS — I34.0 MITRAL VALVE INSUFFICIENCY, UNSPECIFIED ETIOLOGY: ICD-10-CM

## 2024-10-09 DIAGNOSIS — E87.5 HYPERKALEMIA: ICD-10-CM

## 2024-10-09 DIAGNOSIS — M17.9 OSTEOARTHRITIS OF KNEE, UNSPECIFIED LATERALITY, UNSPECIFIED OSTEOARTHRITIS TYPE: ICD-10-CM

## 2024-10-09 DIAGNOSIS — I10 HYPERTENSION, UNSPECIFIED TYPE: Primary | Chronic | ICD-10-CM

## 2024-10-09 PROCEDURE — G2211 COMPLEX E/M VISIT ADD ON: HCPCS | Performed by: INTERNAL MEDICINE

## 2024-10-09 PROCEDURE — 99215 OFFICE O/P EST HI 40 MIN: CPT | Performed by: INTERNAL MEDICINE

## 2024-10-09 RX ORDER — HYDROCHLOROTHIAZIDE 12.5 MG/1
12.5 CAPSULE ORAL DAILY
Qty: 90 CAPSULE | Refills: 0 | Status: SHIPPED | OUTPATIENT
Start: 2024-10-09

## 2024-10-09 NOTE — PROGRESS NOTES
Subjective:   Gerda Crowder is a 84 year old female  who presents for ER F/U     Went to ER for HTN  HTN-today BP better. Per her BP report - BP at goal or near goal. Discussed some slight medication adjustments.   Of note, high sodium could be culprit/contributing factor.   No acute symptoms.   Does have appt with cardiology next week.     Thyroid labs wnl on levothyroxine 9/10/24  History/Other:    Chief Complaint Reviewed and Verified  No Further Nursing Notes to   Review  Allergies Reviewed  Medications Reviewed         Current Outpatient Medications   Medication Sig Dispense Refill    hydroCHLOROthiazide 12.5 MG Oral Cap Take 1 capsule (12.5 mg total) by mouth daily. 90 capsule 0    MELOXICAM 7.5 MG Oral Tab TAKE 1 TABLET(7.5 MG) BY MOUTH DAILY (Patient taking differently: Take 1 tablet (7.5 mg total) by mouth as needed.) 90 tablet 0    lisinopril 40 MG Oral Tab Take 1 tablet (40 mg total) by mouth daily with dinner. 90 tablet 3    hydrALAZINE 25 MG Oral Tab Take 1 tablet (25 mg total) by mouth As Directed. Take 1 tablet with breakfast and 1 tablet with lunch 180 tablet 1    alendronate 35 MG Oral Tab Take 1 tablet (35 mg total) by mouth every 7 days. 12 tablet 3    METOPROLOL SUCCINATE  MG Oral Tablet 24 Hr TAKE 1 TABLET(100 MG) BY MOUTH DAILY 90 tablet 3    aspirin (ASPIRIN 81) 81 MG Oral Tab EC Take 1 tablet (81 mg total) by mouth daily. 90 tablet 3    levothyroxine 75 MCG Oral Tab Take 1 tablet (75 mcg total) by mouth before breakfast. 90 tablet 1    atorvastatin 10 MG Oral Tab Take 1 tablet (10 mg total) by mouth every morning. 90 tablet 3    Turmeric (QC TUMERIC COMPLEX OR) Take by mouth.      Vitamin D3, Cholecalciferol, 25 MCG (1000 UT) Oral Tab Take 1 tablet (1,000 Units total) by mouth daily.      Ascorbic Acid (VITAMIN C) 100 MG Oral Tab Take 1 tablet (100 mg total) by mouth daily.      Glucosamine-Chondroit-Vit C-Mn (GLUCOSAMINE-CHONDROITIN) Oral Tab Take 1 tablet by mouth 2 (two) times  a day.      Omega-3 Fatty Acids (FISH OIL OR) Take by mouth daily.      Multiple Vitamin (MULTIVITAMIN ADULT) Oral Tab Take by mouth daily. (Patient not taking: Reported on 10/9/2024)         Review of Systems:  Pertinent items are noted in HPI.  A comprehensive 10 point review of systems was completed.  Pertinent positives and negatives noted in the the HPI.        Objective:   /78 (BP Location: Right arm, Patient Position: Sitting, Cuff Size: adult)   Pulse 57   Temp 98 °F (36.7 °C) (Temporal)   Resp 15   Wt 132 lb 12.8 oz (60.2 kg)   SpO2 99%   BMI 24.69 kg/m²  Estimated body mass index is 24.69 kg/m² as calculated from the following:    Height as of 6/12/24: 5' 1.5\" (1.562 m).    Weight as of this encounter: 132 lb 12.8 oz (60.2 kg).  PHYSICAL EXAM:   General: no acute distress   Respiratory: no increased work of breathing; good air exchange; CTAB; no crackles or wheezing   Cardiovascular: RRR; S1, S2; systolic murmur; no edema   Neurological: awake, alert, oriented x3; CNII-XII grossly intact;  Behavioral/Psych: euthymic; appropriate affect       Assessment & Plan:   Gerda was seen today for er f/u.    Diagnoses and all orders for this visit:    Hypertension, unspecified type  Grade II diastolic dysfunction  Mitral valve insufficiency, unspecified etiology  -swelling with amlodipine   -add     hydroCHLOROthiazide 12.5 MG Oral Cap; Take 1 capsule (12.5 mg total) by mouth daily.  -continue lisinopril, metoprolol and hydralazine  -close monitoring/follow-up  -has appt with cardiology next week for further work-up     Osteoarthritis of knee, unspecified laterality, unspecified osteoarthritis type  -     Physical Therapy Referral - [facility]    Hypothyroidism- controlled on levothyroxine     HyperK on OSH- likely related to hemolysis base on report. Repeat bmp            Shelby Garduno MD

## 2024-10-11 DIAGNOSIS — I10 HYPERTENSION, UNSPECIFIED TYPE: ICD-10-CM

## 2024-10-14 RX ORDER — LISINOPRIL 40 MG/1
40 TABLET ORAL DAILY
Qty: 90 TABLET | Refills: 3 | OUTPATIENT
Start: 2024-10-14

## 2024-10-14 RX ORDER — LISINOPRIL 40 MG/1
40 TABLET ORAL DAILY
Qty: 90 TABLET | Refills: 3 | Status: SHIPPED | OUTPATIENT
Start: 2024-10-14

## 2024-10-14 NOTE — TELEPHONE ENCOUNTER
PASS  -continue lisinopril, metoprolol and hydralazine     Future Appointments   Date Time Provider Department Center   12/10/2024 11:00 AM Shelby Yarbrough MD [facility] [place]

## 2024-10-22 ENCOUNTER — MED REC SCAN ONLY (OUTPATIENT)
Dept: ORTHOPEDICS CLINIC | Facility: CLINIC | Age: 84
End: 2024-10-22

## 2024-11-15 ENCOUNTER — TELEPHONE (OUTPATIENT)
Dept: PHYSICAL THERAPY | Facility: HOSPITAL | Age: 84
End: 2024-11-15

## 2024-11-18 ENCOUNTER — OFFICE VISIT (OUTPATIENT)
Dept: PHYSICAL THERAPY | Age: 84
End: 2024-11-18
Attending: INTERNAL MEDICINE
Payer: MEDICARE

## 2024-11-18 DIAGNOSIS — M17.9 OSTEOARTHRITIS OF KNEE, UNSPECIFIED LATERALITY, UNSPECIFIED OSTEOARTHRITIS TYPE: Primary | ICD-10-CM

## 2024-11-18 PROCEDURE — 97110 THERAPEUTIC EXERCISES: CPT

## 2024-11-18 PROCEDURE — 97161 PT EVAL LOW COMPLEX 20 MIN: CPT

## 2024-11-18 PROCEDURE — 97530 THERAPEUTIC ACTIVITIES: CPT

## 2024-11-18 NOTE — PROGRESS NOTES
LOWER EXTREMITY EVALUATION:     Diagnosis:   Osteoarthritis of knee, unspecified laterality, unspecified osteoarthritis type (M17.9) R      Referring Provider: Shelby Garduno  Date of Evaluation:    11/18/2024    Precautions:  Hypothyroid, HTN. Recent hospitalization for BP dysregulation.  Next MD visit:   none scheduled  Date of Surgery: n/a     PATIENT SUMMARY   Gerda Crowder is a 84 year old female who presents to therapy today with complaints of R Knee pain. Pain started approx 2020. Pain LOC infer patella. Prolonged walking and stairs brings on pain, no pain at rest. Pain described as aching. Pt reports hist of steroid injects in knee, finding little to no improvement.   Pt describes pain level current 2/10, at best 0/10, at worst 2-3/10.   Current functional limitations include Stairs, walking 0.5 miles and standing greater than 30 minutes.     Gerda describes prior level of function little to restriction.   Past medical history was reviewed with Gerda. Significant findings include no prev hist of knee injuries, but pt was recently hospitalized for blood pressure irregularities. BP should have been taken, but this information was not disclosed until the pt was walking out of the clinic, BP will be monitored closely during therapy.       ASSESSMENT  Gerda presents to physical therapy evaluation with primary c/o R knee pain. The results of the objective tests and measures show Sub optimal standing posture, severe tension in popliteal fossa, limited ROM and accessory motion, reduced ROM and strength, dysfunctional: gait, stairs and balance.  Functional deficits include but are not limited to Stairs, walking 0.5 miles and standing greater than 30 minutes. Signs and symptoms are consistent with diagnosis of Chronic R knee pain. Pt and PT discussed evaluation findings, pathology, POC and HEP.  Pt voiced understanding and performs HEP correctly without reported pain. Skilled Physical Therapy is  medically necessary to address the above impairments and reach functional goals.     OBJECTIVE:   Observation:   -Standing: mod knee flex R  Palpation: No TTP, but sever tension in popliteal area.   Sensation: Light touch in tact    AROM: (* denotes performed with pain)  Hip Knee   All WNL  Flexion: R 120*; L 140  Extension: R 0; L 0      *, and audible crepitus near TKE  Accessory motion:   Patellar: all mild-mod restrictions    Flexibility:  Hamstrings: R +; L -   Quads: R +; L -      Strength/MMT: (* denotes performed with pain)  Hip Knee   Flexion: R 4-/5; L 4/5  Abduction: R 3+/5; L 4-/5  ER: R 5/5; L 5/5  IR: R 5/5; L 5/5 Flexion: R 5/5; L 5/5  Extension: R 5/5; L 5/5        Stairs:   -ascending: WNL  -descending: one step/time, rot R, decreased ecc strength.     Gait: pt ambulates on level ground with R decreased TKE, step length, heel strike and push off; R antalgic pattern    Balance: SLS: R 5 sec, L 3 sec    Today’s Treatment and Response:   Pt education was provided on exam findings, treatment diagnosis, treatment plan, expectations, and prognosis. Pt was also provided recommendations for activity modifications, possible soreness after evaluation, postural corrections, and importance of remaining active.   -Pt educated and demonstrated proper knowledge of safety precautions during EX(BP sxs, safe exercise set up)   Patient was instructed in and issued a HEP for:   Access Code: [email or web address]  URL: [email or web address]  Date: 11/18/2024  Prepared by: Vijay Garibay    Exercises  - Standing Tandem Balance with Counter Support  - 7 x weekly - 4 sets - 30 hold  - Sit to Stand with Arms Crossed  - 7 x weekly - 30 reps    Charges: PT Eval Low Complexity      Total Timed Treatment: Eval-20, TA-16, TE-9  min     Total Treatment Time: 45 min     Based on clinical rationale and outcome measures, this evaluation involved Low Complexity decision making due to 3+ personal factors/comorbidities, 3 body  structures involved/activity limitations, and unstable symptoms including changing pain levels.  PLAN OF CARE:    Goals: (to be met in 12 visits)  Pt will improve knee extension ROM to 0 deg w/out pain or crepitus to stand for 15 mins w/out pain.  Pt will improve knee flex to 140 to negotiate stairs w/out pain.  Pt will improve accessory motion to perform all ADLs w/out pain.  Pt will improve flexibility to WNL to walk 0.5 miles w/out pain.  Pt will improve SLS balance to 10 sec to perform all ADLs w/out pain.       Frequency / Duration: Patient will be seen for a total of 12 visits over a 90 day period. Treatment will include: Gait training, Manual Therapy, Neuromuscular Re-education, Therapeutic Activities, and Therapeutic Exercise    Education or treatment limitation: Communication, pt brought frined to help w/ communication because english is not her first language.   Rehab Potential:good    LEFS Score  LEFS Score: (Proxy-Rptd) 58.75 % (11/18/2024  9:51 AM)      Patient/Family/Caregiver was advised of these findings, precautions, and treatment options and has agreed to actively participate in planning and for this course of care.    Thank you for your referral. Please co-sign or sign and return this letter via fax as soon as possible to [phone]. If you have any questions, please contact me at Dept: [phone]    Sincerely,  Electronically signed by therapist: Clarence Garibay  Physician's certification required: Yes  I certify the need for these services furnished under this plan of treatment and while under my care.    X___________________________________________________ Date____________________    Certification From: 11/18/2024  To:2/16/2025

## 2024-11-21 ENCOUNTER — OFFICE VISIT (OUTPATIENT)
Dept: PHYSICAL THERAPY | Age: 84
End: 2024-11-21
Attending: INTERNAL MEDICINE
Payer: MEDICARE

## 2024-11-21 PROCEDURE — 97110 THERAPEUTIC EXERCISES: CPT

## 2024-11-21 PROCEDURE — 97140 MANUAL THERAPY 1/> REGIONS: CPT

## 2024-11-21 NOTE — PROGRESS NOTES
Diagnosis:   Osteoarthritis of knee, unspecified laterality, unspecified osteoarthritis type (M17.9) R       Referring Provider: Shelby Garduno  Date of Evaluation:    11/18/24    Precautions:  Hypothyroid, HTN. Recent hospitalization for BP dysregulation.  Next MD visit:   none scheduled  Date of Surgery: n/a   Insurance Primary/Secondary: [employer] [facility] / N/A     # Auth Visits: 12            Subjective: Pt reports mild pain in R knee.     Pain: 2/10      Objective:   SLS: 3 R, 5 L      Assessment: Pt arrived to therapy today w/out her friend to help interpret, communication was challenging but exercises were performed adequately. Pt displayed mod sway during SLS implying low stability, which may be contributing to sxs.       Goals: (to be met in 12 visits)  Pt will improve knee extension ROM to 0 deg w/out pain or crepitus to stand for 15 mins w/out pain.  Pt will improve knee flex to 140 to negotiate stairs w/out pain.  Pt will improve accessory motion to perform all ADLs w/out pain.  Pt will improve flexibility to WNL to walk 0.5 miles w/out pain.  Pt will improve SLS balance to 10 sec to perform all ADLs w/out pain.     Plan: Cont improving stability and TKE.   Date: 11/21/2024  TX#: 2/12 Date:                 TX#: 3/ Date:                 TX#: 4/ Date:                 TX#: 5/ Date:   Tx#: 6/   Nu step L1 -8        STM quads and HS -18  Tib-Fem ant and post glides grade 2 -8       SAQ  Quad sets   TKE  Tandem stance balance x 60 sec ea              HEP:   Access Code: [email or web address]  URL: [email or web address]  Date: 11/18/2024  Prepared by: Vjiay Garibay    Exercises  - Standing Tandem Balance with Counter Support  - 7 x weekly - 4 sets - 30 hold  - Sit to Stand with Arms Crossed  - 7 x weekly - 30 reps    Charges: M-2, TE-1       Total Timed Treatment: M-26, TE-14 min  Total Treatment Time: 40 min

## 2024-11-25 ENCOUNTER — OFFICE VISIT (OUTPATIENT)
Dept: PHYSICAL THERAPY | Age: 84
End: 2024-11-25
Attending: INTERNAL MEDICINE
Payer: MEDICARE

## 2024-11-25 PROCEDURE — 97110 THERAPEUTIC EXERCISES: CPT

## 2024-11-25 PROCEDURE — 97140 MANUAL THERAPY 1/> REGIONS: CPT

## 2024-11-25 NOTE — PROGRESS NOTES
Diagnosis:   Osteoarthritis of knee, unspecified laterality, unspecified osteoarthritis type (M17.9) R       Referring Provider: Shelby Garduno  Date of Evaluation:    11/18/24    Precautions:  Hypothyroid, HTN. Recent hospitalization for BP dysregulation.  Next MD visit:   none scheduled  Date of Surgery: n/a   Insurance Primary/Secondary: [employer] [facility] / N/A     # Auth Visits: 12            Subjective: Pt reports min knee pain.     Pain: 3/10      Objective:   BP: 136/59  Palpation: TTP med knee     Assessment: Pt reported med knee pain brought on by knee flex and Wbing activity, mild improvement following hip abd strengthening. Pt displayed mod med/lat sway during BB taps, implying knee instability.     Goals: (to be met in 12 visits)  Pt will improve knee extension ROM to 0 deg w/out pain or crepitus to stand for 15 mins w/out pain.  Pt will improve knee flex to 140 to negotiate stairs w/out pain.  Pt will improve accessory motion to perform all ADLs w/out pain.  Pt will improve flexibility to WNL to walk 0.5 miles w/out pain.  Pt will improve SLS balance to 10 sec to perform all ADLs w/out pain.     Plan: Cont addressing med knee pain.   Date: 11/21/2024  TX#: 2/12 11/25/24                 TX#: 3/12 Date:                 TX#: 4/ Date:                 TX#: 5/ Date:   Tx#: 6/   Nu step L1 -8  Nu step L4 - 6 min       STM quads and HS -18  Tib-Fem ant and post glides grade 2 -8 PROM: prone knee flex/ext -14   STM quads and HS -11       SAQ  Quad sets   TKE  Tandem stance balance x 60 sec ea Heel elevated squats 10# 2 x 15  Leg press 10# x 20  BB taps A/P and M/L x 90 sec ea             HEP:   Access Code: [email or web address]  URL: [email or web address]  Date: 11/18/2024  Prepared by: Vijay Garibay    Exercises  - Standing Tandem Balance with Counter Support  - 7 x weekly - 4 sets - 30 hold  - Sit to Stand with Arms Crossed  - 7 x weekly - 30 reps    Charges: M-2, TE-1       Total Timed Treatment: M-25,  TE-18 min  Total Treatment Time: 43 min

## 2024-12-02 ENCOUNTER — APPOINTMENT (OUTPATIENT)
Dept: PHYSICAL THERAPY | Age: 84
End: 2024-12-02
Attending: INTERNAL MEDICINE
Payer: MEDICARE

## 2024-12-04 ENCOUNTER — OFFICE VISIT (OUTPATIENT)
Dept: PHYSICAL THERAPY | Age: 84
End: 2024-12-04
Attending: INTERNAL MEDICINE
Payer: MEDICARE

## 2024-12-04 ENCOUNTER — TELEPHONE (OUTPATIENT)
Dept: INTERNAL MEDICINE CLINIC | Facility: CLINIC | Age: 84
End: 2024-12-04

## 2024-12-04 DIAGNOSIS — R89.9 ABNORMAL LABORATORY TEST RESULT: ICD-10-CM

## 2024-12-04 DIAGNOSIS — R73.09 ELEVATED HEMOGLOBIN A1C: ICD-10-CM

## 2024-12-04 DIAGNOSIS — E03.8 OTHER SPECIFIED HYPOTHYROIDISM: Primary | Chronic | ICD-10-CM

## 2024-12-04 PROCEDURE — 97140 MANUAL THERAPY 1/> REGIONS: CPT

## 2024-12-04 PROCEDURE — 97110 THERAPEUTIC EXERCISES: CPT

## 2024-12-04 NOTE — TELEPHONE ENCOUNTER
Please check in with patient regarding BP trend.   She should schedule follow-up with cardiology and me

## 2024-12-04 NOTE — TELEPHONE ENCOUNTER
Spoke with patient as was able to get last week blood pressure readings. Patient is taking medications as prescribed. Patient has a follow up appointment with PCP on 12/10 and cardiologist appointment on 12/11.       11/24- 104/56 p-60 8:40 pm  11/25- 107/56 p- 57 10:30 pm  11//58 p-63  9:55 am  11/27- Patient did not get BP  11//54 p-63 11:20am  11/29-- Patient did not get BP  11/30-- Patient did not get BP  12/1- 118/65 p-61 8:15am  12/2-- Patient did not get BP  12/3- 103/52 p-58 6 pm  12/4-   Before PT- 158/59 12:35 pm      After PT-148/71        DVF: Please see BP log. Patient is requesting blood work prior to follow up appointment. Please advise TY!      Future Appointments   Date Time Provider Department Center   12/10/2024 11:00 AM Shelby Yarbrough MD [facility] [place]   12/11/2024 10:15 AM Clarence Garibay [facility] [place]   12/17/2024 11:45 AM Clarence Garibay [facility] [place]   12/19/2024 10:15 AM Clarence Garibay [facility] [place]   1/2/2025 11:00 AM Clarence Garibay [facility] [place]   1/7/2025 10:15 AM Clarence Garibay [facility] [place]

## 2024-12-04 NOTE — PROGRESS NOTES
Diagnosis:   Osteoarthritis of knee, unspecified laterality, unspecified osteoarthritis type (M17.9) R       Referring Provider: Shelby Garduno  Date of Evaluation:    11/18/24    Precautions:  Hypothyroid, HTN. Recent hospitalization for BP dysregulation.  Next MD visit:   none scheduled  Date of Surgery: n/a   Insurance Primary/Secondary: [employer] [facility] / N/A     # Auth Visits: 12            Subjective: Pt reports knee pain has been intermittent. Pt does not have friend to interpret.   Pt also reports headache.     Pain:  0/10 currently, 3-4/10 when pain is brought on.       Objective:   BP:   -158/59 at rest   -148/71 after Nu step  Palpation: severe tension in popliteal fossa,     Assessment: Pt's most severe restrictions: patellar mobilization, popliteal mm tension and TKE. Pt did not report pain during any manual therapy manipulations. Higher level physical activity was not attempted because of high BP readings.     Goals: (to be met in 12 visits)  Pt will improve knee extension ROM to 0 deg w/out pain or crepitus to stand for 15 mins w/out pain.  Pt will improve knee flex to 140 to negotiate stairs w/out pain.  Pt will improve accessory motion to perform all ADLs w/out pain.  Pt will improve flexibility to WNL to walk 0.5 miles w/out pain.  Pt will improve SLS balance to 10 sec to perform all ADLs w/out pain.     Plan: Monitor BP and attempt light strengthening.   Date: 11/21/2024  TX#: 2/12 11/25/24                 TX#: 3/12 Date: 12/4/24                TX#: 4/12 Date:                 TX#: 5/ Date:   Tx#: 6/   Nu step L1 -8  Nu step L4 - 6 min  Nu step L1 -8 min      STM quads and HS -18  Tib-Fem ant and post glides grade 2 -8 PROM: prone knee flex/ext -14   STM quads and HS -11  PROM: prone knee flex, supine knee ext   STM: HS, quad   Patellar mobilizations 4-way x 75 ea      SAQ  Quad sets   TKE  Tandem stance balance x 60 sec ea Heel elevated squats 10# 2 x 15  Leg press 10# x 20  BB taps A/P and M/L x  90 sec ea Supine HS stretch w/ strap 3 x 60 sec R             HEP:   Access Code: [email or web address]  URL: [email or web address]  Date: 11/18/2024  Prepared by: Vijay Garibay    Exercises  - Standing Tandem Balance with Counter Support  - 7 x weekly - 4 sets - 30 hold  - Sit to Stand with Arms Crossed  - 7 x weekly - 30 reps    Charges: M-3, TE-1       Total Timed Treatment: M-41, TE-13 min  Total Treatment Time: 54 min

## 2024-12-05 NOTE — TELEPHONE ENCOUNTER
Overall BP better controlled than prior except for increase during PT. Will further discuss this at appt

## 2024-12-10 ENCOUNTER — LAB ENCOUNTER (OUTPATIENT)
Dept: LAB | Age: 84
End: 2024-12-10
Attending: INTERNAL MEDICINE
Payer: MEDICARE

## 2024-12-10 ENCOUNTER — OFFICE VISIT (OUTPATIENT)
Dept: INTERNAL MEDICINE CLINIC | Facility: CLINIC | Age: 84
End: 2024-12-10
Payer: MEDICARE

## 2024-12-10 VITALS
DIASTOLIC BLOOD PRESSURE: 60 MMHG | TEMPERATURE: 98 F | SYSTOLIC BLOOD PRESSURE: 120 MMHG | OXYGEN SATURATION: 96 % | WEIGHT: 130.81 LBS | BODY MASS INDEX: 24 KG/M2 | HEART RATE: 57 BPM

## 2024-12-10 DIAGNOSIS — I10 HYPERTENSION, UNSPECIFIED TYPE: Primary | ICD-10-CM

## 2024-12-10 DIAGNOSIS — I51.89 GRADE II DIASTOLIC DYSFUNCTION: ICD-10-CM

## 2024-12-10 DIAGNOSIS — R73.09 ELEVATED HEMOGLOBIN A1C: ICD-10-CM

## 2024-12-10 DIAGNOSIS — E03.8 OTHER SPECIFIED HYPOTHYROIDISM: Chronic | ICD-10-CM

## 2024-12-10 DIAGNOSIS — I34.0 MITRAL VALVE INSUFFICIENCY, UNSPECIFIED ETIOLOGY: ICD-10-CM

## 2024-12-10 DIAGNOSIS — R89.9 ABNORMAL LABORATORY TEST RESULT: ICD-10-CM

## 2024-12-10 DIAGNOSIS — R73.03 PREDIABETES: Chronic | ICD-10-CM

## 2024-12-10 LAB
BASOPHILS # BLD AUTO: 0.05 X10(3) UL (ref 0–0.2)
BASOPHILS NFR BLD AUTO: 1 %
EOSINOPHIL # BLD AUTO: 0.18 X10(3) UL (ref 0–0.7)
EOSINOPHIL NFR BLD AUTO: 3.8 %
ERYTHROCYTE [DISTWIDTH] IN BLOOD BY AUTOMATED COUNT: 12.3 %
EST. AVERAGE GLUCOSE BLD GHB EST-MCNC: 120 MG/DL (ref 68–126)
HBA1C MFR BLD: 5.8 % (ref ?–5.7)
HCT VFR BLD AUTO: 37.4 %
HGB BLD-MCNC: 12.1 G/DL
IMM GRANULOCYTES # BLD AUTO: 0.01 X10(3) UL (ref 0–1)
IMM GRANULOCYTES NFR BLD: 0.2 %
LYMPHOCYTES # BLD AUTO: 1.41 X10(3) UL (ref 1–4)
LYMPHOCYTES NFR BLD AUTO: 29.6 %
MCH RBC QN AUTO: 33.5 PG (ref 26–34)
MCHC RBC AUTO-ENTMCNC: 32.4 G/DL (ref 31–37)
MCV RBC AUTO: 103.6 FL
MONOCYTES # BLD AUTO: 0.58 X10(3) UL (ref 0.1–1)
MONOCYTES NFR BLD AUTO: 12.2 %
NEUTROPHILS # BLD AUTO: 2.54 X10 (3) UL (ref 1.5–7.7)
NEUTROPHILS # BLD AUTO: 2.54 X10(3) UL (ref 1.5–7.7)
NEUTROPHILS NFR BLD AUTO: 53.2 %
PLATELET # BLD AUTO: 220 10(3)UL (ref 150–450)
RBC # BLD AUTO: 3.61 X10(6)UL
T4 FREE SERPL-MCNC: 2.2 NG/DL (ref 0.8–1.7)
TSI SER-ACNC: 0.16 UIU/ML (ref 0.55–4.78)
WBC # BLD AUTO: 4.8 X10(3) UL (ref 4–11)

## 2024-12-10 PROCEDURE — 85025 COMPLETE CBC W/AUTO DIFF WBC: CPT

## 2024-12-10 PROCEDURE — G2211 COMPLEX E/M VISIT ADD ON: HCPCS | Performed by: INTERNAL MEDICINE

## 2024-12-10 PROCEDURE — 84443 ASSAY THYROID STIM HORMONE: CPT

## 2024-12-10 PROCEDURE — 99214 OFFICE O/P EST MOD 30 MIN: CPT | Performed by: INTERNAL MEDICINE

## 2024-12-10 PROCEDURE — 84439 ASSAY OF FREE THYROXINE: CPT

## 2024-12-10 PROCEDURE — 83036 HEMOGLOBIN GLYCOSYLATED A1C: CPT

## 2024-12-10 PROCEDURE — 36415 COLL VENOUS BLD VENIPUNCTURE: CPT

## 2024-12-10 NOTE — PROGRESS NOTES
Subjective:   Gerda Crowder is a 84 year old female  who presents for Follow - Up     HTN/G2DD- BP has greatly improved.   During PT session slightly higher but has improved after.  Also following with cardiology given above.   Today reports feeling much better.   At home has been mostly in 100s-110s/80s-60s  Has appt with cardiology tomorrow. To take BP log with her.     Hypothyroidism- labs pending. On levothyroxine   preDM- diet changes. Lab pending.     History/Other:    Chief Complaint Reviewed and Verified  No Further Nursing Notes to   Review  Allergies Reviewed  Medications Reviewed  OB Status Reviewed         Current Outpatient Medications   Medication Sig Dispense Refill    LISINOPRIL 40 MG Oral Tab TAKE 1 TABLET BY MOUTH EVERY DAY 90 tablet 3    hydroCHLOROthiazide 12.5 MG Oral Cap Take 1 capsule (12.5 mg total) by mouth daily. 90 capsule 0    MELOXICAM 7.5 MG Oral Tab TAKE 1 TABLET(7.5 MG) BY MOUTH DAILY (Patient taking differently: Take 1 tablet (7.5 mg total) by mouth as needed for Pain.) 90 tablet 0    hydrALAZINE 25 MG Oral Tab Take 1 tablet (25 mg total) by mouth As Directed. Take 1 tablet with breakfast and 1 tablet with lunch 180 tablet 1    alendronate 35 MG Oral Tab Take 1 tablet (35 mg total) by mouth every 7 days. 12 tablet 3    METOPROLOL SUCCINATE  MG Oral Tablet 24 Hr TAKE 1 TABLET(100 MG) BY MOUTH DAILY 90 tablet 3    aspirin (ASPIRIN 81) 81 MG Oral Tab EC Take 1 tablet (81 mg total) by mouth daily. 90 tablet 3    levothyroxine 75 MCG Oral Tab Take 1 tablet (75 mcg total) by mouth before breakfast. 90 tablet 1    atorvastatin 10 MG Oral Tab Take 1 tablet (10 mg total) by mouth every morning. 90 tablet 3    Turmeric (QC TUMERIC COMPLEX OR) Take by mouth.      Vitamin D3, Cholecalciferol, 25 MCG (1000 UT) Oral Tab Take 1 tablet (1,000 Units total) by mouth daily.      Ascorbic Acid (VITAMIN C) 100 MG Oral Tab Take 1 tablet (100 mg total) by mouth daily.      Multiple Vitamin  (MULTIVITAMIN ADULT) Oral Tab Take by mouth daily.      Glucosamine-Chondroit-Vit C-Mn (GLUCOSAMINE-CHONDROITIN) Oral Tab Take 1 tablet by mouth 2 (two) times a day.      Omega-3 Fatty Acids (FISH OIL OR) Take by mouth daily.         Review of Systems:  Pertinent items are noted in HPI.  A comprehensive 10 point review of systems was completed.  Pertinent positives and negatives noted in the the HPI.        Objective:   /60 (BP Location: Left arm, Patient Position: Sitting, Cuff Size: adult)   Pulse 57   Temp 97.7 °F (36.5 °C) (Temporal)   Wt 130 lb 12.8 oz (59.3 kg)   SpO2 96%   BMI 24.31 kg/m²  Estimated body mass index is 24.31 kg/m² as calculated from the following:    Height as of 6/12/24: 5' 1.5\" (1.562 m).    Weight as of this encounter: 130 lb 12.8 oz (59.3 kg).  PHYSICAL EXAM:   General: no acute distress   Respiratory: no increased work of breathing; good air exchange; CTAB; no crackles or wheezing   Cardiovascular: RRR; S1, S2; no murmurs; no edema   Neurological: awake, alert, oriented x3; CNII-XII grossly intact;  Behavioral/Psych: euthymic; appropriate affect       Assessment & Plan:   Gerda was seen today for follow - up.    Diagnoses and all orders for this visit:    Hypertension, unspecified type  Grade II diastolic dysfunction  Mitral valve insufficiency, unspecified etiology  -continue lisinopril, metoprolol, hydrochlorothiazide and metoprolol as directed  -monitor BP   -follow-up with cardiology given hx of fluctuation/resistant htn     Prediabetes  -diet  -labs    Other specified hypothyroidism  -levothyroxine  -check labs               Shelby Garduno MD

## 2024-12-11 ENCOUNTER — OFFICE VISIT (OUTPATIENT)
Dept: PHYSICAL THERAPY | Age: 84
End: 2024-12-11
Attending: INTERNAL MEDICINE
Payer: MEDICARE

## 2024-12-11 PROCEDURE — 97110 THERAPEUTIC EXERCISES: CPT

## 2024-12-11 NOTE — PROGRESS NOTES
Diagnosis:   Osteoarthritis of knee, unspecified laterality, unspecified osteoarthritis type (M17.9) R       Referring Provider: Shelby Garduno  Date of Evaluation:    11/18/24    Precautions:  Hypothyroid, HTN. Recent hospitalization for BP dysregulation.  Next MD visit:   none scheduled  Date of Surgery: n/a   Insurance Primary/Secondary: HUMANA [facility] / N/A     # Auth Visits: 12            Subjective: Pt reports pain on inside of knee continues, but better than last visit.     Pain:  2-3/10       Objective:   BP:   -130/60  Stairs: WNL      Assessment: Pt did not report pain during any EX today, and negotiating stairs safely. Pt's knee pain likely due to quad weakness and instability of the entire LE, because strength and balance training appear to be having positive impacts.       Goals: (to be met in 12 visits)  Pt will improve knee extension ROM to 0 deg w/out pain or crepitus to stand for 15 mins w/out pain.  Pt will improve knee flex to 140 to negotiate stairs w/out pain.  Pt will improve accessory motion to perform all ADLs w/out pain.  Pt will improve flexibility to WNL to walk 0.5 miles w/out pain.  Pt will improve SLS balance to 10 sec to perform all ADLs w/out pain.     Plan: Cont improving quad strength and stability.   Date: 11/21/2024  TX#: 2/12 11/25/24                 TX#: 3/12 Date: 12/4/24                TX#: 4/12 Date: 12/11/24                 TX#: 5/12 Date:   Tx#: 6/   Nu step L1 -8  Nu step L4 - 6 min  Nu step L1 -8 min  Nu step L4 - 9     STM quads and HS -18  Tib-Fem ant and post glides grade 2 -8 PROM: prone knee flex/ext -14   STM quads and HS -11  PROM: prone knee flex, supine knee ext   STM: HS, quad   Patellar mobilizations 4-way x 75 ea      SAQ  Quad sets   TKE  Tandem stance balance x 60 sec ea Heel elevated squats 10# 2 x 15  Leg press 10# x 20  BB taps A/P and M/L x 90 sec ea Supine HS stretch w/ strap 3 x 60 sec R  Heel elevated squats 3 x 20  BB taps and holds A/P and M/L x 90  sec ea  Amb around FC x 4 laps  Stair negotiation x 1  Supine HS stretch x strap x 90 sec ea           HEP:   Access Code: [email or web address]  URL: [email or web address]  Date: 11/18/2024  Prepared by: Vijay Garibay    Exercises  - Standing Tandem Balance with Counter Support  - 7 x weekly - 4 sets - 30 hold  - Sit to Stand with Arms Crossed  - 7 x weekly - 30 reps    Charges:  TE-3      Total Timed Treatment:  TE-42 min  Total Treatment Time: 42 min

## 2024-12-17 ENCOUNTER — OFFICE VISIT (OUTPATIENT)
Dept: PHYSICAL THERAPY | Age: 84
End: 2024-12-17
Attending: INTERNAL MEDICINE
Payer: MEDICARE

## 2024-12-17 PROCEDURE — 97110 THERAPEUTIC EXERCISES: CPT

## 2024-12-17 NOTE — PROGRESS NOTES
Diagnosis:   Osteoarthritis of knee, unspecified laterality, unspecified osteoarthritis type (M17.9) R       Referring Provider: Shelby Garduno  Date of Evaluation:    11/18/24    Precautions:  Hypothyroid, HTN. Recent hospitalization for BP dysregulation.  Next MD visit:   none scheduled  Date of Surgery: n/a   Insurance Primary/Secondary: HUMANA [facility] / N/A     # Auth Visits: 12            Subjective: Pt reports knee pain continues to improve.     Pain:  1-2/10       Objective:   Gait: excessive knee flex R, reduced speed.       Assessment: Pt reported mod-severe fatigue during leg EX implying LE weakness and deconditioning which is likely related to her knee sxs. Today's visit had to be shortened because of the pt's  showing up late to pick her up.       Goals: (to be met in 12 visits)  Pt will improve knee extension ROM to 0 deg w/out pain or crepitus to stand for 15 mins w/out pain.  Pt will improve knee flex to 140 to negotiate stairs w/out pain.  Pt will improve accessory motion to perform all ADLs w/out pain.  Pt will improve flexibility to WNL to walk 0.5 miles w/out pain.  Pt will improve SLS balance to 10 sec to perform all ADLs w/out pain.     Plan: Cont strengthening thigh mm.   Date: 11/21/2024  TX#: 2/12 11/25/24                 TX#: 3/12 Date: 12/4/24                TX#: 4/12 Date: 12/11/24                 TX#: 5/12 Date: 12/17/24  Tx#: 6/12   Nu step L1 -8  Nu step L4 - 6 min  Nu step L1 -8 min  Nu step L4 - 9     STM quads and HS -18  Tib-Fem ant and post glides grade 2 -8 PROM: prone knee flex/ext -14   STM quads and HS -11  PROM: prone knee flex, supine knee ext   STM: HS, quad   Patellar mobilizations 4-way x 75 ea      SAQ  Quad sets   TKE  Tandem stance balance x 60 sec ea Heel elevated squats 10# 2 x 15  Leg press 10# x 20  BB taps A/P and M/L x 90 sec ea Supine HS stretch w/ strap 3 x 60 sec R  Heel elevated squats 3 x 20  BB taps and holds A/P and M/L x 90 sec ea  Amb around FC x 4  laps  Stair negotiation x 1  Supine HS stretch x strap x 90 sec ea Leg ext 25# 3 x 15  Leg curl 25#  x 10   Knee flex stretch on step x 90 sec ea  Side lunge stretch x 60 sec ea  SLS balance x 60 sec ea          HEP:   Access Code: [email or web address]  URL: [email or web address]  Date: 11/18/2024  Prepared by: Vijay Garibay    Exercises  - Standing Tandem Balance with Counter Support  - 7 x weekly - 4 sets - 30 hold  - Sit to Stand with Arms Crossed  - 7 x weekly - 30 reps    Charges:  TE-2      Total Timed Treatment:  TE-30 min  Total Treatment Time: 30 min

## 2024-12-18 ENCOUNTER — TELEPHONE (OUTPATIENT)
Dept: INTERNAL MEDICINE CLINIC | Facility: CLINIC | Age: 84
End: 2024-12-18

## 2024-12-18 NOTE — TELEPHONE ENCOUNTER
Patient called the office stating her handicap placard expires this month and needs a new form completed. Patient is requesting to have this form mailed to her. Please advise.

## 2024-12-18 NOTE — TELEPHONE ENCOUNTER
I called patient and confirmed with her that she wanted he placard mailed to the address on  file she confirmed placed envelope in the mail bin

## 2024-12-19 ENCOUNTER — OFFICE VISIT (OUTPATIENT)
Dept: PHYSICAL THERAPY | Age: 84
End: 2024-12-19
Attending: INTERNAL MEDICINE
Payer: MEDICARE

## 2024-12-19 PROCEDURE — 97140 MANUAL THERAPY 1/> REGIONS: CPT

## 2024-12-19 PROCEDURE — 97110 THERAPEUTIC EXERCISES: CPT

## 2024-12-19 NOTE — PROGRESS NOTES
Diagnosis:   Osteoarthritis of knee, unspecified laterality, unspecified osteoarthritis type (M17.9) R       Referring Provider: Shelby Garduno  Date of Evaluation:    11/18/24    Precautions:  Hypothyroid, HTN. Recent hospitalization for BP dysregulation.  Next MD visit:   none scheduled  Date of Surgery: n/a   Insurance Primary/Secondary: [employer]MANA [facility] / N/A     # Auth Visits: 12            Subjective: Pt reports no change from last visit, pain not as severe as expected following increase in activity last visit.     Pain:  1-2/10       Objective:   Patellar mobility: Lat greatest restriction.          Assessment: Pt cont display severe difficulty during leg EX implying quad and HS weakness, likely contributing to sxs because weak mm may be resulting over-straining knee joint.       Goals: (to be met in 12 visits)  Pt will improve knee extension ROM to 0 deg w/out pain or crepitus to stand for 15 mins w/out pain.  Pt will improve knee flex to 140 to negotiate stairs w/out pain.  Pt will improve accessory motion to perform all ADLs w/out pain.  Pt will improve flexibility to WNL to walk 0.5 miles w/out pain.  Pt will improve SLS balance to 10 sec to perform all ADLs w/out pain.     Plan: Assess response to patellar mobility.   Date: 12/11/24                 TX#: 5/12 Date: 12/17/24  Tx#: 6/12 12/19/24 7/12   Nu step L4 - 9   Nu step L5 -6         Heel elevated squats 3 x 20  BB taps and holds A/P and M/L x 90 sec ea  Amb around FC x 4 laps  Stair negotiation x 1  Supine HS stretch x strap x 90 sec ea Leg ext 25# 3 x 15  Leg curl 25#  x 10   Knee flex stretch on step x 90 sec ea  Side lunge stretch x 60 sec ea  SLS balance x 60 sec ea Leg ext 20# 3 x 10  Leg curl 10#  2 x 10   BB A/P and M/L x 90 sec ea       Patellar mobilization 4-way x 30 ea  STM patella and surrounding areas -6    HEP:   Access Code: [email or web address]  URL: [email or web address]  Date: 11/18/2024  Prepared by: Vijay  [place]    Exercises  - Standing Tandem Balance with Counter Support  - 7 x weekly - 4 sets - 30 hold  - Sit to Stand with Arms Crossed  - 7 x weekly - 30 reps    Charges:  M-1, TE-2      Total Timed Treatment:  M-12, TE-31 min  Total Treatment Time: 43 min

## 2024-12-27 ENCOUNTER — TELEPHONE (OUTPATIENT)
Dept: INTERNAL MEDICINE CLINIC | Facility: CLINIC | Age: 84
End: 2024-12-27

## 2024-12-27 DIAGNOSIS — I10 PRIMARY HYPERTENSION: Chronic | ICD-10-CM

## 2024-12-27 DIAGNOSIS — Z01.89 ROUTINE LAB DRAW: ICD-10-CM

## 2024-12-27 DIAGNOSIS — E03.8 OTHER SPECIFIED HYPOTHYROIDISM: Chronic | ICD-10-CM

## 2024-12-27 DIAGNOSIS — E78.5 HYPERLIPIDEMIA, UNSPECIFIED HYPERLIPIDEMIA TYPE: Chronic | ICD-10-CM

## 2024-12-27 NOTE — TELEPHONE ENCOUNTER
Called pt to schedule AWV and she requested her annual labs be put in prior to her appt so she may discuss with DVF.    Future Appointments   Date Time Provider Department Center   1/2/2025 11:00 AM Clarence Garibay [facility] PHYS T [place]   1/7/2025 10:15 AM Clarence Garibay [facility] PHYS T [place]   3/6/2025  9:00 AM Shelby Yarbrough MD [facility] 8 [facility] [place]     Please call pt when labs are in the system.

## 2025-01-02 ENCOUNTER — OFFICE VISIT (OUTPATIENT)
Dept: PHYSICAL THERAPY | Age: 85
End: 2025-01-02
Attending: INTERNAL MEDICINE
Payer: MEDICARE

## 2025-01-02 PROCEDURE — 97530 THERAPEUTIC ACTIVITIES: CPT

## 2025-01-02 PROCEDURE — 97110 THERAPEUTIC EXERCISES: CPT

## 2025-01-02 NOTE — PROGRESS NOTES
Diagnosis:   Osteoarthritis of knee, unspecified laterality, unspecified osteoarthritis type (M17.9) R       Referring Provider: Shelby Garduno  Date of Evaluation:    11/18/24    Precautions:  Hypothyroid, HTN. Recent hospitalization for BP dysregulation.  Next MD visit:   none scheduled  Date of Surgery: n/a   Insurance Primary/Secondary: HUMANA [facility] / N/A     # Auth Visits: 12            Progress Summary  Pt has attended 8 visits in Physical Therapy.     Subjective: Pt reports the knee continues to improve. The only concern is low confidence during balance exercises. Pt would like to attend additional visit to learn exercises to improve balance, and receive a comprehensive home exercise plan to maintain improvements made during physical impairments.     Assessment: Pt's knee appears to have healed to WNL. Only a small balance deficit remains, and the pt will benefit from one last visit to practice balance exercise fro home to decrease chances of a fall. Therefore, skilled physical therapy continues to be medically necessary.     Objective:   Observation:   -Standing: mod knee flex R       New: Min knee flex in standing   Palpation: No TTP, but sever tension in popliteal area.     New: No TTP       AROM: (* denotes performed with pain)  Hip Knee New   All WNL  Flexion: R 120*; L 140  Extension: R 0; L 0    Flex: 135     *, and audible crepitus near TKE  Accessory motion:   Patellar: all mild-mod restrictions       New: min restriction    Flexibility:  Hamstrings: R +; L -                     New: All WNL   Quads: R +; L -      Strength/MMT: (* denotes performed with pain)  Hip Knee New   Flexion: R 4-/5; L 4/5  Abduction: R 3+/5; L 4-/5  ER: R 5/5; L 5/5  IR: R 5/5; L 5/5 Flexion: R 5/5; L 5/5  Extension: R 5/5; L 5/5    Flex: 4/5 B  ABD: 4-/5 R, 4/5 L     Stairs:   -ascending: WNL  -descending: one step/time, rot R, decreased ecc strength.     New: Mild med/lat sway     Gait: pt ambulates on level ground with  R decreased TKE, step length, heel strike and push off; R antalgic pattern  New: WNL   Balance: SLS: R 5 sec, L 3 sec           New: R 7 sec, L 5 sec, w/ mild-mod sway       Hep: Access Code: [email or web address]  URL: [email or web address]  Date: 01/02/2025  Prepared by: Vijay Garibay    Exercises  - Supine Bridge  - 1 x daily - 3 x weekly - 3 sets - 20 reps  - Squat with Counter Support  - 1 x daily - 3 x weekly - 3 sets - 20 reps  - Standing Hip Abduction with Counter Support  - 1 x daily - 3 x weekly - 3 sets - 20 reps  - Standing Tandem Balance with Counter Support  - 1 x daily - 3 x weekly - 3 sets - 30 hold      Charges:  TA, TE-2;  TA-17, TE-29; Total: 46 min     Additional Interventions:  Bridges 2 x 30  Side stepping OTB x 2 laps  Shuttle press 5C 2 x 20  BB tandem holds A/P and M/L x 90 sec       Goals: (to be met in 12 visits)  Pt will improve knee extension ROM to 0 deg w/out pain or crepitus to stand for 15 mins w/out pain. MET  Pt will improve knee flex to 140 to negotiate stairs w/out pain. Improving   Pt will improve accessory motion to perform all ADLs w/out pain. Improved  Pt will improve flexibility to WNL to walk 0.5 miles w/out pain. MET  Pt will improve SLS balance to 10 sec to perform all ADLs w/out pain. Improved    Rehab Potential: good    Plan: Continue skilled Physical Therapy for a total of 1 visits over a 90 day period. Treatment will include: TE, TA, NM and M.        Patient/Family/Caregiver was advised of these findings, precautions, and treatment options and has agreed to actively participate in planning and for this course of care.    Thank you for your referral. If you have any questions, please contact me at Dept: [phone].    Sincerely,  Electronically signed by therapist: Clarence Garibay    Physician's certification required: Yes  Please co-sign or sign and return this letter via fax as soon as possible to [phone].   I certify the need for these services  furnished under this plan of treatment and while under my care.    X___________________________________________________ Date____________________    Certification From: 1/2/2025  To:4/2/2025

## 2025-01-04 DIAGNOSIS — I51.89 GRADE II DIASTOLIC DYSFUNCTION: ICD-10-CM

## 2025-01-04 DIAGNOSIS — I10 HYPERTENSION, UNSPECIFIED TYPE: Chronic | ICD-10-CM

## 2025-01-07 ENCOUNTER — OFFICE VISIT (OUTPATIENT)
Dept: PHYSICAL THERAPY | Age: 85
End: 2025-01-07
Attending: INTERNAL MEDICINE
Payer: MEDICARE

## 2025-01-07 PROCEDURE — 97140 MANUAL THERAPY 1/> REGIONS: CPT

## 2025-01-07 PROCEDURE — 97530 THERAPEUTIC ACTIVITIES: CPT

## 2025-01-07 RX ORDER — HYDROCHLOROTHIAZIDE 12.5 MG/1
12.5 CAPSULE ORAL DAILY
Qty: 90 CAPSULE | Refills: 0 | Status: SHIPPED | OUTPATIENT
Start: 2025-01-07

## 2025-01-07 NOTE — TELEPHONE ENCOUNTER
Protocol passed     LOV: 12/10/24   RTC: 3 months  Filled: 10/9/24 #90   Labs: 11/16/24   Future Appointments   Date Time Provider Department Center   3/6/2025  9:00 AM Shelby Yarbrough MD [facility] [place]

## 2025-01-07 NOTE — PROGRESS NOTES
Diagnosis:   Osteoarthritis of knee, unspecified laterality, unspecified osteoarthritis type (M17.9) R       Referring Provider: Shelby Garduno  Date of Evaluation:    11/18/24    Precautions:  Hypothyroid, HTN. Recent hospitalization for BP dysregulation.  Next MD visit:   none scheduled  Date of Surgery: n/a   Insurance Primary/Secondary: HUMANA [facility] / N/A     # Auth Visits: 12     Discharge Summary  Pt has attended 9 visits in Physical Therapy.     Subjective: Pt reports the knee pain is almost never present, just after long periods of being on her feet or waking up in the morning. Rest and HEP improves sxs. Pt is not restricted from performing any activities.     Assessment: Pt has improved on all physical impairments. Further improvements could be made to ROM and balance but the minor deficits noted are not significant enough to consider safety risks or functional limitations. Pt demonstrated appropriate understanding of HEP and safety recommendations. Therefore, the pt can safely maintain improvements made independently and be discharged from therapy.       Objective:   Observation:   -Standing: mod knee flex R       New: WNL      AROM: (* denotes performed with pain)  Hip Knee New PROM   All WNL  Flexion: R 120*; L 140  Extension: R 0; L 0    Flex: 136   Flex: mild restriction at end-range    *, and audible crepitus near TKE  Accessory motion:   Patellar: all mild-mod restrictions       New: WNL          Strength/MMT: (* denotes performed with pain)  Hip Knee New   Flexion: R 4-/5; L 4/5  Abduction: R 3+/5; L 4-/5  ER: R 5/5; L 5/5  IR: R 5/5; L 5/5 Flexion: R 5/5; L 5/5  Extension: R 5/5; L 5/5    Flex: 4/5 B   ABD: 4-/5 B     Stairs:   -descending: one step/time, rot R, decreased ecc strength.     New: WNL w/ Uni UE support     Balance: SLS: R 5 sec, L 3 sec           New: 15 sec w/ mild sway       LEFS post: 85     Hep: Access Code: [email or web address]  URL: [email or web address]  Date:  01/02/2025  Prepared by: Vijay Garibay    Exercises  - Supine Bridge  - 1 x daily - 3 x weekly - 3 sets - 20 reps  - Squat with Counter Support  - 1 x daily - 3 x weekly - 3 sets - 20 reps  - Standing Hip Abduction with Counter Support  - 1 x daily - 3 x weekly - 3 sets - 20 reps  - Standing Tandem Balance with Counter Support  - 1 x daily - 3 x weekly - 3 sets - 30 hold      Charges: TA-2, M-2; TA-25, M-31 ; Total: 56 min     Additional Interventions:  STM ant and post thigh  PROM: flex/ext in supine and prone   Stair safety: Pt instructed to use hand railing at all times.      Goals: (to be met in 12 visits)  Pt will improve knee extension ROM to 0 deg w/out pain or crepitus to stand for 15 mins w/out pain. MET  Pt will improve knee flex to 140 to negotiate stairs w/out pain. Improving   Pt will improve accessory motion to perform all ADLs w/out pain. MET  Pt will improve flexibility to WNL to walk 0.5 miles w/out pain. MET  Pt will improve SLS balance to 10 sec to perform all ADLs w/out pain. MET        Patient/Family/Caregiver was advised of these findings, precautions, and treatment options and has agreed to actively participate in planning and for this course of care.    Thank you for your referral. If you have any questions, please contact me at Dept: [phone].    Sincerely,  Electronically signed by therapist: Clarence Garibay    Physician's certification required: No  Please co-sign or sign and return this letter via fax as soon as possible to [phone].   I certify the need for these services furnished under this plan of treatment and while under my care.    X___________________________________________________ Date____________________    Certification From: 1/7/2025  To:4/7/2025

## 2025-01-10 ENCOUNTER — LAB ENCOUNTER (OUTPATIENT)
Dept: LAB | Age: 85
End: 2025-01-10
Attending: INTERNAL MEDICINE
Payer: MEDICARE

## 2025-01-10 DIAGNOSIS — I10 PRIMARY HYPERTENSION: ICD-10-CM

## 2025-01-10 DIAGNOSIS — E03.8 OTHER SPECIFIED HYPOTHYROIDISM: ICD-10-CM

## 2025-01-10 DIAGNOSIS — Z01.89 ROUTINE LAB DRAW: ICD-10-CM

## 2025-01-10 DIAGNOSIS — E78.5 HYPERLIPIDEMIA, UNSPECIFIED HYPERLIPIDEMIA TYPE: ICD-10-CM

## 2025-01-10 LAB
ALBUMIN SERPL-MCNC: 4.5 G/DL (ref 3.2–4.8)
ALBUMIN/GLOB SERPL: 1.6 {RATIO} (ref 1–2)
ALP LIVER SERPL-CCNC: 35 U/L
ALT SERPL-CCNC: 19 U/L
ANION GAP SERPL CALC-SCNC: 6 MMOL/L (ref 0–18)
AST SERPL-CCNC: 25 U/L (ref ?–34)
BASOPHILS # BLD AUTO: 0.05 X10(3) UL (ref 0–0.2)
BASOPHILS NFR BLD AUTO: 0.9 %
BILIRUB SERPL-MCNC: 0.6 MG/DL (ref 0.2–1.1)
BUN BLD-MCNC: 20 MG/DL (ref 9–23)
CALCIUM BLD-MCNC: 9.7 MG/DL (ref 8.7–10.4)
CHLORIDE SERPL-SCNC: 104 MMOL/L (ref 98–112)
CHOLEST SERPL-MCNC: 146 MG/DL (ref ?–200)
CO2 SERPL-SCNC: 28 MMOL/L (ref 21–32)
CREAT BLD-MCNC: 1.22 MG/DL
EGFRCR SERPLBLD CKD-EPI 2021: 44 ML/MIN/1.73M2 (ref 60–?)
EOSINOPHIL # BLD AUTO: 0.1 X10(3) UL (ref 0–0.7)
EOSINOPHIL NFR BLD AUTO: 1.7 %
ERYTHROCYTE [DISTWIDTH] IN BLOOD BY AUTOMATED COUNT: 12.4 %
EST. AVERAGE GLUCOSE BLD GHB EST-MCNC: 120 MG/DL (ref 68–126)
FASTING PATIENT LIPID ANSWER: YES
FASTING STATUS PATIENT QL REPORTED: YES
GLOBULIN PLAS-MCNC: 2.8 G/DL (ref 2–3.5)
GLUCOSE BLD-MCNC: 119 MG/DL (ref 70–99)
HBA1C MFR BLD: 5.8 % (ref ?–5.7)
HCT VFR BLD AUTO: 37.8 %
HDLC SERPL-MCNC: 61 MG/DL (ref 40–59)
HGB BLD-MCNC: 12.4 G/DL
IMM GRANULOCYTES # BLD AUTO: 0.01 X10(3) UL (ref 0–1)
IMM GRANULOCYTES NFR BLD: 0.2 %
LDLC SERPL CALC-MCNC: 62 MG/DL (ref ?–100)
LYMPHOCYTES # BLD AUTO: 1.45 X10(3) UL (ref 1–4)
LYMPHOCYTES NFR BLD AUTO: 25.1 %
MCH RBC QN AUTO: 33.3 PG (ref 26–34)
MCHC RBC AUTO-ENTMCNC: 32.8 G/DL (ref 31–37)
MCV RBC AUTO: 101.6 FL
MONOCYTES # BLD AUTO: 0.54 X10(3) UL (ref 0.1–1)
MONOCYTES NFR BLD AUTO: 9.3 %
NEUTROPHILS # BLD AUTO: 3.63 X10 (3) UL (ref 1.5–7.7)
NEUTROPHILS # BLD AUTO: 3.63 X10(3) UL (ref 1.5–7.7)
NEUTROPHILS NFR BLD AUTO: 62.8 %
NONHDLC SERPL-MCNC: 85 MG/DL (ref ?–130)
OSMOLALITY SERPL CALC.SUM OF ELEC: 290 MOSM/KG (ref 275–295)
PLATELET # BLD AUTO: 222 10(3)UL (ref 150–450)
POTASSIUM SERPL-SCNC: 4.4 MMOL/L (ref 3.5–5.1)
PROT SERPL-MCNC: 7.3 G/DL (ref 5.7–8.2)
RBC # BLD AUTO: 3.72 X10(6)UL
SODIUM SERPL-SCNC: 138 MMOL/L (ref 136–145)
T4 FREE SERPL-MCNC: 1.7 NG/DL (ref 0.8–1.7)
TRIGL SERPL-MCNC: 134 MG/DL (ref 30–149)
TSI SER-ACNC: 3.66 UIU/ML (ref 0.55–4.78)
VIT D+METAB SERPL-MCNC: 54.4 NG/ML (ref 30–100)
VLDLC SERPL CALC-MCNC: 20 MG/DL (ref 0–30)
WBC # BLD AUTO: 5.8 X10(3) UL (ref 4–11)

## 2025-01-10 PROCEDURE — 36415 COLL VENOUS BLD VENIPUNCTURE: CPT

## 2025-01-10 PROCEDURE — 83036 HEMOGLOBIN GLYCOSYLATED A1C: CPT

## 2025-01-10 PROCEDURE — 80061 LIPID PANEL: CPT

## 2025-01-10 PROCEDURE — 80053 COMPREHEN METABOLIC PANEL: CPT

## 2025-01-10 PROCEDURE — 84443 ASSAY THYROID STIM HORMONE: CPT

## 2025-01-10 PROCEDURE — 82306 VITAMIN D 25 HYDROXY: CPT

## 2025-01-10 PROCEDURE — 84439 ASSAY OF FREE THYROXINE: CPT

## 2025-01-10 PROCEDURE — 85025 COMPLETE CBC W/AUTO DIFF WBC: CPT

## 2025-01-15 ENCOUNTER — TELEPHONE (OUTPATIENT)
Dept: HEMATOLOGY/ONCOLOGY | Facility: HOSPITAL | Age: 85
End: 2025-01-15

## 2025-01-15 DIAGNOSIS — E03.8 OTHER SPECIFIED HYPOTHYROIDISM: ICD-10-CM

## 2025-01-15 NOTE — TELEPHONE ENCOUNTER
While otp going over lab results; pt stated she needed a refill of 75 mcg levothyroxine. Pended refill.    Take 1 tablet (50 mcg total) by mouth As Directed. Before breakfast take 50mcg Monday through Friday. Take 75mcg on Saturday and Sunday.     Route:   Oral

## 2025-01-15 NOTE — TELEPHONE ENCOUNTER
Requesting    levothyroxine 75 MCG Oral Tab         Sig: Take 1 tablet (75 mcg total) by mouth before breakfast. Only on Saturday and sundays    Disp: Not specified    Refills: 0    Start: 1/15/2025    Class: Normal    Non-formulary    Thyroid Medication Protocol [id number]/15/2025 01:35 PM   Protocol Details TSH in past 12 months    Last TSH value is normal    In person appointment or virtual visit in the past 12 mos or appointment in next 3 mos    Medication is active on med list      To be filled at: [id number] - [place], [place] - [address] AT [facility] [address], [phone], [phone]     LOV: 12/10/24 w/ DVF  RTC: 03/10/25  Last Relevant Labs: 01/10/25    Future Appointments   Date Time Provider Department Center   3/6/2025  9:00 AM Shelby Yarbrough MD [facility] 8 [facility] [place]

## 2025-01-16 RX ORDER — LEVOTHYROXINE SODIUM 75 UG/1
75 TABLET ORAL AS DIRECTED
Qty: 24 TABLET | Refills: 0 | Status: SHIPPED | OUTPATIENT
Start: 2025-01-16

## 2025-01-16 RX ORDER — LEVOTHYROXINE SODIUM 50 UG/1
50 TABLET ORAL AS DIRECTED
Qty: 90 TABLET | Refills: 1 | Status: SHIPPED | OUTPATIENT
Start: 2025-01-16

## 2025-01-16 RX ORDER — LEVOTHYROXINE SODIUM 75 UG/1
75 TABLET ORAL
Qty: 24 TABLET | Refills: 0 | Status: SHIPPED | OUTPATIENT
Start: 2025-01-16 | End: 2025-01-16

## 2025-02-07 ENCOUNTER — OFFICE VISIT (OUTPATIENT)
Age: 85
End: 2025-02-07
Attending: INTERNAL MEDICINE
Payer: MEDICARE

## 2025-02-07 VITALS
RESPIRATION RATE: 18 BRPM | DIASTOLIC BLOOD PRESSURE: 76 MMHG | BODY MASS INDEX: 24.73 KG/M2 | HEIGHT: 61 IN | WEIGHT: 131 LBS | OXYGEN SATURATION: 98 % | HEART RATE: 60 BPM | TEMPERATURE: 98 F | SYSTOLIC BLOOD PRESSURE: 150 MMHG

## 2025-02-07 DIAGNOSIS — D75.89 MACROCYTOSIS WITHOUT ANEMIA: Primary | ICD-10-CM

## 2025-02-07 DIAGNOSIS — Z13.29 SCREENING FOR THYROID DISORDER: ICD-10-CM

## 2025-02-07 DIAGNOSIS — Z86.2 HISTORY OF ANEMIA: ICD-10-CM

## 2025-02-07 DIAGNOSIS — R71.8 RBC ABNORMALITY: ICD-10-CM

## 2025-02-07 LAB
ALBUMIN SERPL-MCNC: 4.6 G/DL (ref 3.2–4.8)
ALBUMIN/GLOB SERPL: 1.5 {RATIO} (ref 1–2)
ALP LIVER SERPL-CCNC: 40 U/L
ALT SERPL-CCNC: 17 U/L
ANION GAP SERPL CALC-SCNC: 7 MMOL/L (ref 0–18)
AST SERPL-CCNC: 22 U/L (ref ?–34)
BASOPHILS # BLD AUTO: 0.07 X10(3) UL (ref 0–0.2)
BASOPHILS NFR BLD AUTO: 1.2 %
BILIRUB SERPL-MCNC: 0.5 MG/DL (ref 0.2–1.1)
BUN BLD-MCNC: 20 MG/DL (ref 9–23)
CALCIUM BLD-MCNC: 9.6 MG/DL (ref 8.7–10.6)
CHLORIDE SERPL-SCNC: 104 MMOL/L (ref 98–112)
CO2 SERPL-SCNC: 28 MMOL/L (ref 21–32)
CREAT BLD-MCNC: 1.2 MG/DL
DEPRECATED HBV CORE AB SER IA-ACNC: 55 NG/ML
EGFRCR SERPLBLD CKD-EPI 2021: 45 ML/MIN/1.73M2 (ref 60–?)
EOSINOPHIL # BLD AUTO: 0.1 X10(3) UL (ref 0–0.7)
EOSINOPHIL NFR BLD AUTO: 1.8 %
ERYTHROCYTE [DISTWIDTH] IN BLOOD BY AUTOMATED COUNT: 12.5 %
FASTING STATUS PATIENT QL REPORTED: NO
FOLATE SERPL-MCNC: >48 NG/ML (ref 5.4–?)
GLOBULIN PLAS-MCNC: 3 G/DL (ref 2–3.5)
GLUCOSE BLD-MCNC: 131 MG/DL (ref 70–99)
HCT VFR BLD AUTO: 37.7 %
HGB BLD-MCNC: 12.8 G/DL
HGB RETIC QN AUTO: 37.6 PG (ref 28.2–36.6)
IMM GRANULOCYTES # BLD AUTO: 0.02 X10(3) UL (ref 0–1)
IMM GRANULOCYTES NFR BLD: 0.4 %
IMM RETICS NFR: 0.13 RATIO (ref 0.1–0.3)
IRON SATN MFR SERPL: 24 %
IRON SERPL-MCNC: 75 UG/DL
LYMPHOCYTES # BLD AUTO: 1.47 X10(3) UL (ref 1–4)
LYMPHOCYTES NFR BLD AUTO: 25.8 %
MCH RBC QN AUTO: 34.3 PG (ref 26–34)
MCHC RBC AUTO-ENTMCNC: 34 G/DL (ref 31–37)
MCV RBC AUTO: 101.1 FL
MONOCYTES # BLD AUTO: 0.61 X10(3) UL (ref 0.1–1)
MONOCYTES NFR BLD AUTO: 10.7 %
NEUTROPHILS # BLD AUTO: 3.43 X10 (3) UL (ref 1.5–7.7)
NEUTROPHILS # BLD AUTO: 3.43 X10(3) UL (ref 1.5–7.7)
NEUTROPHILS NFR BLD AUTO: 60.1 %
OSMOLALITY SERPL CALC.SUM OF ELEC: 292 MOSM/KG (ref 275–295)
PLATELET # BLD AUTO: 209 10(3)UL (ref 150–450)
POTASSIUM SERPL-SCNC: 3.7 MMOL/L (ref 3.5–5.1)
PROT SERPL-MCNC: 7.6 G/DL (ref 5.7–8.2)
RBC # BLD AUTO: 3.73 X10(6)UL
RETICS # AUTO: 71.6 X10(3) UL (ref 22.5–147.5)
RETICS/RBC NFR AUTO: 1.9 %
SODIUM SERPL-SCNC: 139 MMOL/L (ref 136–145)
T4 FREE SERPL-MCNC: 1.7 NG/DL (ref 0.8–1.7)
TOTAL IRON BINDING CAPACITY: 316 UG/DL (ref 250–425)
TRANSFERRIN SERPL-MCNC: 245 MG/DL (ref 250–380)
TSI SER-ACNC: 2.21 UIU/ML (ref 0.55–4.78)
VIT B12 SERPL-MCNC: 897 PG/ML (ref 211–911)
WBC # BLD AUTO: 5.7 X10(3) UL (ref 4–11)

## 2025-02-07 NOTE — CONSULTS
[facility] Hematology & Oncology Initial Consultation Note    Patient Name: Gerda Crowder  Medical Record Number: [id number]    YOB: 1940   Date of Consultation: 2/7/2025     Reason for Consultation/Chief Complaint:  mild macrocytosis, low RBC  Physician requesting consultation: Shelby Yarbrough      History of Present Illness:  Ms. Torres is a very pleasant 84 year old F with PMHx of hypothyroidism on levothyroxine and HTN who presents to hematology/oncology today for evaluation and treatment recommendations of mild macrocytosis and low RBC.     Per bloodwork 1/10/25:  - . 6 . Previously 103.6, and 102.5  - Hb 12.4. Previously 12.1 and 12.3  - RBC 3.72. Previously 3.61    Patient is fatigued and has intermittent lightheadedness but no dizziness. Has intermittent head heaviness but not headaches. She has some mild difficulty with recall. No balance issues. Has never gotten colonoscopy before.     She has not lost any weight in 1 year. Appetite is stable. Denies fevers, chills, nausea, vomiting, LAD.     She lives on her own and is completely independent, ECOG 0-1.     In the last 3 months she has started hydrochlorothiazide.     She does not drink alcohol or smoke cigarettes.     Past Medical History:  Past Medical History:    Atypical chest pain    (-) cardiolite 9/09    Elevated fasting glucose    (-) gtt 8/12    High cholesterol    Hypertension    Osteopenia    Other and unspecified hyperlipidemia    Unspecified essential hypertension       Past Surgical History:  Past Surgical History:   Procedure Laterality Date    Appendectomy      Other surgical history      goiter removed 1983       Current Outpatient Medications:  Medications Ordered Prior to Encounter[1]    Allergies:   Allergies[2]    Family Medical History:  Family History   Problem Relation Age of Onset    Hypertension Father     Hypertension Mother     Diabetes Sister        Social History:  Social History     Social  History Narrative    Not on file     Social History     Socioeconomic History    Marital status:    Tobacco Use    Smoking status: Never    Smokeless tobacco: Never    Tobacco comments:     Updated 9/11/24   Vaping Use    Vaping status: Never Used   Substance and Sexual Activity    Alcohol use: No    Drug use: No   Other Topics Concern    Caffeine Concern Yes    Exercise Yes    Seat Belt Yes       Review of Systems:  A 10-point ROS was done with pertinent positives and negatives per the HPI.     ECOG Performance Status: 0-1    Vital Signs:  Height: 154.9 cm (5' 1\") (02/07 1035)  Weight: 59.4 kg (131 lb) (02/07 1035)  BSA (Calculated - sq m): 1.58 sq meters (02/07 1035)  Pulse: 60 (02/07 1035)  BP: 150/76 (02/07 1035)  Temp: 97.8 °F (36.6 °C) (02/07 1035)  Do Not Use - Resp Rate: --  SpO2: 98 % (02/07 1035)  Wt Readings from Last 6 Encounters:   02/07/25 59.4 kg (131 lb)   12/10/24 59.3 kg (130 lb 12.8 oz)   10/09/24 60.2 kg (132 lb 12.8 oz)   09/10/24 60.7 kg (133 lb 12.8 oz)   07/18/24 60.1 kg (132 lb 9.6 oz)   07/08/24 61 kg (134 lb 6.4 oz)       Physical Examination:  General: Well-nourished and well-appearing. No acute distress.  Eyes: EOMI, bilateral conjunctiva normal. Sclera anicteric.  ENT: Oropharynx is clear. Mucous membranes moist.  Lymph Nodes: No cervical or supraclavicular lymphadenopathy.  Respiratory: Lungs clear to auscultation bilaterally.  Cardiovascular: Regular rate and rhythm, no murmurs. No lower extremity edema.   GI: Soft, non-tender, non-distended with normoactive bowel sounds. No hepatosplenomegaly.  Heme: normal capillary refill. No ecchymosis, petechiae, or purpura.  Neurological: Alert and oriented. No apparent focal sensory or motor deficits  Skin: no rashes or lesions.  Psychiatric: Normal mood and affect.    Data Review  Laboratory Studies:  No results for input(s): \"WBC\", \"HGB\", \"HCT\", \"PLT\", \"MCV\", \"RDW\", \"NEPRELIM\" in the last 168 hours.  No results for input(s): \"NA\", \"K\",  \"CL\", \"CO2\", \"BUN\", \"CREATSERUM\", \"GFRAA\", \"GFRNAA\", \"GLU\", \"CA\", \"PHOS\", \"TP\", \"ALB\", \"ALKPHO\", \"AST\", \"ALT\", \"BILT\" in the last 168 hours.    Invalid input(s): \"MAG\"  No results for input(s): \"PT\", \"INR\", \"PTT\", \"FIB\" in the last 168 hours.    Assessment:  Ms. Torres is a very pleasant 84 year old F with PMHx of hypothyroidism on levothyroxine and HTN who presents to hematology/oncology today for evaluation and treatment recommendations of mild macrocytosis and low RBC.       Recommendations:  Mild macrocytosis, Mildly low RBC    Per bloodwork 1/10/25:  - . 6 . Previously 103.6, and 102.5  - Hb 12.4. Previously 12.1 and 12.3  - RBC 3.72. Previously 3.61    Patient is fatigued and has intermittent lightheadedness but no dizziness. Has intermittent head heaviness but not headaches. She has some mild difficulty with recall. No balance issues. Has never gotten colonoscopy before.     She has not lost any weight in 1 year. Appetite is stable. Denies fevers, chills, nausea, vomiting, LAD.     She lives on her own and is completely independent, ECOG 0-1.     In the last 3 months she has started hydrochlorothiazide.     She does not drink alcohol or smoke cigarettes. No history of blood disorders or cancers.       Plan:  - She might be iron deficient and/or B12 deficient causing mildly low RBC and mildly high MCV, respectively. Iron studies today: CBC with diff, CMP, folate, B12, iron/TIBC, TSH, soluble transferrin receptor  - I will let her know if she needs replacement of iron, B12, or folate.         Follow Up: Return to clinic in 3 months for labs and visit with me.     Zahra Huizar D.O.  [facility]        Note to patient: The 21 Century Cures Act makes medical notes like these available to patients in the interest of transparency. However, be advised this is a medical document. It is intended as peer to peer communication. It is written in medical language and may contain  abbreviations or verbiage that are unfamiliar. It may appear blunt or direct. Medical documents are intended to carry relevant information, facts as evident, and the clinical opinion of the practitioner.       In reviewing this note, please be advised that Dragon Voice Recognition software used to dictate the note may have made errors in recognizing some of the words or phrases.          [1]   Current Outpatient Medications on File Prior to Visit   Medication Sig Dispense Refill    levothyroxine 50 MCG Oral Tab Take 1 tablet (50 mcg total) by mouth As Directed. Before breakfast take 50mcg Monday through Friday. Take 75mcg on Saturday and Sunday. 90 tablet 1    levothyroxine 75 MCG Oral Tab Take 1 tablet (75 mcg total) by mouth As Directed. Take 75mcg on Saturday and Sundays 24 tablet 0    HYDROCHLOROTHIAZIDE 12.5 MG Oral Cap TAKE 1 CAPSULE(12.5 MG) BY MOUTH DAILY 90 capsule 0    LISINOPRIL 40 MG Oral Tab TAKE 1 TABLET BY MOUTH EVERY DAY 90 tablet 3    MELOXICAM 7.5 MG Oral Tab TAKE 1 TABLET(7.5 MG) BY MOUTH DAILY (Patient taking differently: Take 1 tablet (7.5 mg total) by mouth as needed for Pain.) 90 tablet 0    hydrALAZINE 25 MG Oral Tab Take 1 tablet (25 mg total) by mouth As Directed. Take 1 tablet with breakfast and 1 tablet with lunch 180 tablet 1    alendronate 35 MG Oral Tab Take 1 tablet (35 mg total) by mouth every 7 days. 12 tablet 3    METOPROLOL SUCCINATE  MG Oral Tablet 24 Hr TAKE 1 TABLET(100 MG) BY MOUTH DAILY 90 tablet 3    aspirin (ASPIRIN 81) 81 MG Oral Tab EC Take 1 tablet (81 mg total) by mouth daily. 90 tablet 3    atorvastatin 10 MG Oral Tab Take 1 tablet (10 mg total) by mouth every morning. 90 tablet 3    Turmeric (QC TUMERIC COMPLEX OR) Take by mouth.      Vitamin D3, Cholecalciferol, 25 MCG (1000 UT) Oral Tab Take 1 tablet (1,000 Units total) by mouth daily.      Ascorbic Acid (VITAMIN C) 100 MG Oral Tab Take 1 tablet (100 mg total) by mouth daily.      Multiple Vitamin  (MULTIVITAMIN ADULT) Oral Tab Take by mouth daily.      Glucosamine-Chondroit-Vit C-Mn (GLUCOSAMINE-CHONDROITIN) Oral Tab Take 1 tablet by mouth 2 (two) times a day.      Omega-3 Fatty Acids (FISH OIL OR) Take by mouth daily.       No current facility-administered medications on file prior to visit.   [2] No Known Allergies     Adult

## 2025-02-07 NOTE — PROGRESS NOTES
Pt here for consult regarding abnormal CBC. Medications and medical history reviewed. Pt states she gets intermittent headaches and lightheadedness. She also states she \"forgets some things\" intermittently. No fatigue, palpitations, dizziness, or weakness reported. No rectal bleeding/blood in stool. No history of iron/blood transfusions.       Education Record    Learner:  Patient and Family Member    Disease / Diagnosis: abnormal cbc    Barriers / Limitations:  None   Comments:    Method:  Discussion   Comments:    General Topics:  Medication, Pain, Side effects and symptom management, and Plan of care reviewed   Comments:    Outcome:  Observed demonstration and Shows understanding   Comments:

## 2025-02-07 NOTE — PATIENT INSTRUCTIONS
Hello!    Return to see me 3 months for labs and visit.     If you need iron, B12, or folate, I will message you on MyChart.     It was very nice meeting you,  Dr. Huizar

## 2025-02-10 ENCOUNTER — TELEPHONE (OUTPATIENT)
Age: 85
End: 2025-02-10

## 2025-02-10 LAB — SOL TRANSFERRIN RECEPTOR: 14.4 NMOL/L

## 2025-02-10 RX ORDER — FERROUS SULFATE 325(65) MG
325 TABLET ORAL
Qty: 90 TABLET | Refills: 2 | Status: SHIPPED | OUTPATIENT
Start: 2025-02-10

## 2025-02-10 NOTE — TELEPHONE ENCOUNTER
Test(s) Completed: iron labs    Results: per Dr. Huizar \"Please tell her to take one iron tablet (325 mg), once every other day with orange juice. I sent prescription to her pharmacy.\"     Plan: relayed above information to patient, stated understanding and in agreement with plan.

## 2025-02-25 ENCOUNTER — TELEPHONE (OUTPATIENT)
Dept: INTERNAL MEDICINE CLINIC | Facility: CLINIC | Age: 85
End: 2025-02-25

## 2025-02-25 DIAGNOSIS — Z01.00 EYE EXAM, ROUTINE: ICD-10-CM

## 2025-02-25 DIAGNOSIS — R73.03 PREDIABETES: Primary | ICD-10-CM

## 2025-02-25 NOTE — TELEPHONE ENCOUNTER
Referral Request - Please ensure insurance is updated and verified.    Reason for the referral: annual checkup for her eyes    Has the patient been seen by their PCP for this condition (if not, schedule an appointment):   Is an authorized or pending referral in [facility]? no   -If yes, notify the patient of the status and/or fax per patient request.    Provider, specialty and contact information:   -Name: Abhijit Valdez  -Address: [address] [place] [address]  -Phone: [phone]  -Fax: [phone]    Has the patient seen this specialist in the past?: **  CPT Code for this visit:   Dx Code:   Number of visits requested: 3  Date of appointment, if scheduled (route as high priority if within 1 week): 3/10/25  If requesting out of network provider, please provide reason:       Notify the patient that referrals can take up to 1 week or sometimes longer for authorization.

## 2025-03-06 ENCOUNTER — TELEPHONE (OUTPATIENT)
Dept: INTERNAL MEDICINE CLINIC | Facility: CLINIC | Age: 85
End: 2025-03-06

## 2025-03-06 DIAGNOSIS — E78.5 HYPERLIPIDEMIA, UNSPECIFIED HYPERLIPIDEMIA TYPE: Chronic | ICD-10-CM

## 2025-03-06 RX ORDER — ATORVASTATIN CALCIUM 10 MG/1
10 TABLET, FILM COATED ORAL EVERY MORNING
Qty: 90 TABLET | Refills: 3 | Status: SHIPPED | OUTPATIENT
Start: 2025-03-06

## 2025-03-06 NOTE — TELEPHONE ENCOUNTER
Requesting    Name from pharmacy: ATORVASTATIN 10MG TABLETS         Will file in chart as: ATORVASTATIN 10 MG Oral Tab    Sig: TAKE 1 TABLET(10 MG) BY MOUTH EVERY MORNING    Disp: 90 tablet    Refills: 3 (Pharmacy requested: Not specified)    Start: 3/6/2025    Class: Normal    Non-formulary For: Hyperlipidemia, unspecified hyperlipidemia type    Last ordered: 1 year ago (2/6/2024) by Shelby Garduno MD    Last refill: 11/1/2024    Rx #: [id number]    Cholesterol Medication Protocol [id number]/06/2025 01:08 PM   Protocol Details ALT < 80    ALT resulted within past year    Lipid panel within past 12 months    In person appointment or virtual visit in the past 12 mos or appointment in next 3 mos    Medication is active on med list      To be filled at: [id number] - [place], [place] - [address] [facility] [address] [address] [phone], [phone]     LOV: 12/10/24 w/ DVF  RTC: 03/10/25  Last Relevant Labs: 02/07/25  Future Appointments   Date Time Provider Department Center   4/30/2025  9:00 AM Shelby Yarbrough MD [facility] [place]

## 2025-03-06 NOTE — TELEPHONE ENCOUNTER
Pt called asking for referral to be faxed to eye doctor. Faxed and confirmation received. Nothing more needed.

## 2025-04-04 DIAGNOSIS — I10 HYPERTENSION, UNSPECIFIED TYPE: Chronic | ICD-10-CM

## 2025-04-04 DIAGNOSIS — I51.89 GRADE II DIASTOLIC DYSFUNCTION: ICD-10-CM

## 2025-04-04 RX ORDER — HYDROCHLOROTHIAZIDE 12.5 MG/1
12.5 CAPSULE ORAL DAILY
Qty: 90 CAPSULE | Refills: 1 | Status: SHIPPED | OUTPATIENT
Start: 2025-04-04

## 2025-04-04 NOTE — TELEPHONE ENCOUNTER
Requesting    Name from pharmacy: HYDROCHLOROTHIAZIDE 12.5MG CAPSULES         Will file in chart as: HYDROCHLOROTHIAZIDE 12.5 MG Oral Cap    Sig: TAKE 1 CAPSULE(12.5 MG) BY MOUTH DAILY    Disp: 90 capsule    Refills: 0 (Pharmacy requested: Not specified)    Start: 4/4/2025    Class: Normal    Non-formulary For: Grade II diastolic dysfunction; Hypertension, unspecified type    Last ordered: 2 months ago (1/7/2025) by Shelby Garduno MD    Last refill: 1/7/2025    Rx #: [id number]    Hypertension Medications Protocol Eocnox3204/04/2025 11:40 AM   Protocol Details Last BP reading less than 140/90    EGFRCR or GFRNAA > 50    CMP or BMP in past 12 months    In person appointment or virtual visit in the past 12 mos or appointment in next 3 mos    Medication is active on med list        LOV: 12/10/2024  RTC: 3/10/2025  Last Relevant Labs: 2/7/2025  Filled: 1/7/2025 #90 with 0 refills    Future Appointments   Date Time Provider Department Center   4/30/2025  9:00 AM Shelby Yarbrough MD [facility] [place]

## 2025-04-23 DIAGNOSIS — I10 HYPERTENSION, UNSPECIFIED TYPE: ICD-10-CM

## 2025-04-23 RX ORDER — HYDRALAZINE HYDROCHLORIDE 25 MG/1
TABLET, FILM COATED ORAL
Qty: 180 TABLET | Refills: 1 | Status: SHIPPED | OUTPATIENT
Start: 2025-04-23

## 2025-04-23 NOTE — TELEPHONE ENCOUNTER
Requesting    Name from pharmacy: HYDRALAZINE  25MG TABLETS(ORANGE)         Will file in chart as: HYDRALAZINE 25 MG Oral Tab    Sig: TAKE 1 TABLET(25 MG) BY MOUTH WITH BREAKFAST AND WITH LUNCH AS DIRECTED    Disp: 180 tablet    Refills: 1 (Pharmacy requested: Not specified)    Start: 4/23/2025    Class: Normal    Non-formulary For: Hypertension, unspecified type    Last ordered: 7 months ago (9/10/2024) by Shelby Garduno MD    Last refill: 1/28/2025    Rx #: [id number]    Hypertension Medications Protocol Tmsudi7504/23/2025 10:01 AM   Protocol Details Last BP reading less than 140/90    EGFRCR or GFRNAA > 50    Medication is active on med list    CMP or BMP in past 12 months    In person appointment or virtual visit in the past 12 mos or appointment in next 3 mos      To be filled at: [id number] - [place], [place] - [address] [facility] [address], [phone], [phone]     LOV: 12/10/24 w/ DVF  RTC: 12/10/24  Last Relevant Labs: 02/07/25    Future Appointments   Date Time Provider Department Center   4/30/2025  9:00 AM Shelby Yarbrough MD [facility] [place]

## 2025-04-30 ENCOUNTER — OFFICE VISIT (OUTPATIENT)
Dept: INTERNAL MEDICINE CLINIC | Facility: CLINIC | Age: 85
End: 2025-04-30
Payer: MEDICARE

## 2025-04-30 VITALS
HEART RATE: 52 BPM | BODY MASS INDEX: 24.19 KG/M2 | HEIGHT: 61.5 IN | TEMPERATURE: 98 F | OXYGEN SATURATION: 98 % | DIASTOLIC BLOOD PRESSURE: 60 MMHG | SYSTOLIC BLOOD PRESSURE: 120 MMHG | WEIGHT: 129.81 LBS

## 2025-04-30 DIAGNOSIS — I10 HYPERTENSION, UNSPECIFIED TYPE: Chronic | ICD-10-CM

## 2025-04-30 DIAGNOSIS — M85.80 OSTEOPENIA, UNSPECIFIED LOCATION: ICD-10-CM

## 2025-04-30 DIAGNOSIS — I34.0 MITRAL VALVE INSUFFICIENCY, UNSPECIFIED ETIOLOGY: ICD-10-CM

## 2025-04-30 DIAGNOSIS — E78.5 HYPERLIPIDEMIA, UNSPECIFIED HYPERLIPIDEMIA TYPE: Chronic | ICD-10-CM

## 2025-04-30 DIAGNOSIS — M17.11 PRIMARY OSTEOARTHRITIS OF RIGHT KNEE: Chronic | ICD-10-CM

## 2025-04-30 DIAGNOSIS — M22.40 CHONDROMALACIA OF PATELLA, UNSPECIFIED LATERALITY: Chronic | ICD-10-CM

## 2025-04-30 DIAGNOSIS — I10 ESSENTIAL HYPERTENSION: Chronic | ICD-10-CM

## 2025-04-30 DIAGNOSIS — Z00.00 ENCOUNTER FOR ANNUAL WELLNESS VISIT (AWV) IN MEDICARE PATIENT: Primary | ICD-10-CM

## 2025-04-30 DIAGNOSIS — R73.03 PREDIABETES: Chronic | ICD-10-CM

## 2025-04-30 DIAGNOSIS — E03.8 OTHER SPECIFIED HYPOTHYROIDISM: Chronic | ICD-10-CM

## 2025-04-30 DIAGNOSIS — I51.89 GRADE II DIASTOLIC DYSFUNCTION: ICD-10-CM

## 2025-04-30 DIAGNOSIS — Z59.10 INADEQUATE HOUSING, UNSPECIFIED: ICD-10-CM

## 2025-04-30 DIAGNOSIS — Z51.81 MEDICATION MONITORING ENCOUNTER: ICD-10-CM

## 2025-04-30 RX ORDER — METOPROLOL SUCCINATE 100 MG/1
100 TABLET, EXTENDED RELEASE ORAL DAILY
Qty: 90 TABLET | Refills: 3 | Status: SHIPPED | OUTPATIENT
Start: 2025-04-30

## 2025-04-30 RX ORDER — LEVOTHYROXINE SODIUM 75 UG/1
75 TABLET ORAL AS DIRECTED
Qty: 24 TABLET | Refills: 3 | Status: SHIPPED | OUTPATIENT
Start: 2025-04-30

## 2025-04-30 SDOH — ECONOMIC STABILITY - HOUSING INSECURITY: INADEQUATE HOUSING UNSPECIFIED: Z59.10

## 2025-04-30 NOTE — PROGRESS NOTES
Subjective:   Gerda Crowder is a 84 year old female who presents for a Subsequent Annual Wellness visit (Pt already had Initial Annual Wellness) and the following: .     The following individual(s) verbally consented to be recorded using ambient AI listening technology and understand that they can each withdraw their consent to this listening technology at any point by asking the clinician to turn off or pause the recording:    Patient name: Gerda Crowder      History of Present Illness  Ms. Gerda Crowder is an 84 year old female who presents for an annual wellness check.    Her blood pressure is generally well-controlled. Denies cp/sob.  She reports sometimes only taking hydrochlorothiazide as needed, last used regularly three days ago.    Her prediabetes is stable    She continues atorvastatin for cholesterol management, with good control noted in January. She manages diastolic dysfunction and mitral valve insufficiency with regular cardiology follow-ups.    Thyroid function remains stable with levothyroxine dosing of 50 mg Monday to Friday and 75 mg on weekends, with reassessment due in July.    She takes alendronate weekly for osteopenia. Knee pain is tolerable, managed with meloxicam as needed, avoiding aspirin on those days.         History/Other:   Fall Risk Assessment:   She has been screened for Falls and is High Risk. Fall Prevention information provided to patient in After Visit Summary.    Do you feel unsteady when standing or walking?: No  Do you worry about falling?: Yes  Have you fallen in the past year?: No     Cognitive Assessment:   She had a completely normal cognitive assessment - see flowsheet entries     Functional Ability/Status:   Gerda Crowder has some abnormal functions as listed below:  She has difficulties Affording Meds based on screening of functional status.       Depression Screening (PHQ):  PHQ-2 SCORE: 0  , done 4/30/2025   Last [place] Suicide Screening on 4/30/2025  was No Risk.         Advanced Directives:   She does NOT have a Living Will. [Do you have a living will?: No]  She does NOT have a Power of  for Health Care. [Do you have a healthcare power of ?: No]  Not discussed      Problem List[1]  Allergies:  She has no known allergies.    Current Medications:  Active Meds, Sig Only[2]    Medical History:  She  has a past medical history of Atypical chest pain, Elevated fasting glucose, High cholesterol, Hypertension, Osteopenia, Other and unspecified hyperlipidemia, and Unspecified essential hypertension.  Surgical History:  She  has a past surgical history that includes appendectomy and other surgical history.   Family History:  Her family history includes Diabetes in her sister; Hypertension in her father and mother.  Social History:  She  reports that she has never smoked. She has never used smokeless tobacco. She reports that she does not drink alcohol and does not use drugs.    Tobacco:  She has never smoked tobacco.    CAGE Alcohol Screen:   CAGE screening score of 0 on 4/30/2025, showing low risk of alcohol abuse.      Patient Care Team:  Shelby Yarbrough MD as PCP - General (Internal Medicine)  Abhijit Valdez (OPHTHALMOLOGY)  Roxy Rico DPM (PODIATRIST)  Clarence Garibay (Physical Therapy)    Review of Systems  As in HPI    Objective:   Physical Exam      /60 (BP Location: Left arm, Patient Position: Sitting, Cuff Size: adult)   Pulse 52   Temp 97.9 °F (36.6 °C) (Temporal)   Ht 5' 1.5\" (1.562 m)   Wt 129 lb 12.8 oz (58.9 kg)   SpO2 98%   BMI 24.13 kg/m²  Estimated body mass index is 24.13 kg/m² as calculated from the following:    Height as of this encounter: 5' 1.5\" (1.562 m).    Weight as of this encounter: 129 lb 12.8 oz (58.9 kg).    Medicare Hearing Assessment:   Hearing Screening    Time taken: 4/30/2025  8:53 AM  Entry User: Rosa Quiroz MA  Screening Method: Finger Rub  Finger Rub Result: Pass                Assessment & Plan:   Gerda Crowder is a 84 year old female who presents for a Medicare Assessment.     There are no diagnoses linked to this encounter.  Assessment & Plan  Wellness Visit  - Continue current blood pressure medication regimen.  - Check blood pressure once daily after medication intake.  - Schedule follow-up labs in three months     Hypertension  Blood pressure generally within target range. Discussed medication adherence and regular monitoring.       Diastolic dysfunction and mitral valve insufficiency  Follows with cardiologist for diastolic dysfunction and mitral valve insufficiency. Blood pressure control crucial to prevent cardiac issues. Cardiologist advised continuation of medications   - Continue current cardiac medications.  - Follow up with cardiologist in July.    Prediabetes  - Order A1c test in July.    Hypothyroidism  Hypothyroidism well controlled with levothyroxine.  - Continue current levothyroxine regimen.  - Order thyroid function tests in July.      Osteopenia  Osteopenia managed with weekly alendronate. Bone density stable.  - Continue weekly alendronate.  - Schedule bone density test for June 2026.    Knee osteoarthritis  Knee osteoarthritis with tolerable pain. Uses meloxicam as needed. Discussed potential for knee replacement but prefers to avoid surgery. Referral to orthopedics for potential future injections if pain worsens.  - Continue meloxicam as needed for knee pain.  - Avoid taking aspirin on days when meloxicam is used if stomach upset occurs.  - Provide referral to orthopedics for potential future injections.  The patient indicates understanding of these issues and agrees to the plan.  Reinforced healthy diet, lifestyle, and exercise.      No follow-ups on file.     Shelby Garduno MD, 4/30/2025     Supplementary Documentation:   General Health:  In the past six months, have you lost more than 10 pounds without trying?: 2 - No  Has your appetite been  poor?: No  Type of Diet: Balanced  How does the patient maintain a good energy level?: Daily Walks  How would you describe your daily physical activity?: Light  How would you describe your current health state?: Good  How do you maintain positive mental well-being?: Social Interaction  On a scale of 0 to 10, with 0 being no pain and 10 being severe pain, what is your pain level?: 9 - (Severe)  In the past six months, have you experienced urine leakage?: 0-No  At any time do you feel concerned for the safety/well-being of yourself and/or your children, in your home or elsewhere?: No  Have you had any immunizations at another office such as Influenza, Hepatitis B, Tetanus, or Pneumococcal?: No           [1]   Patient Active Problem List  Diagnosis    Chondromalacia of patella    Hypertension    Hyperlipidemia    Prediabetes    Other specified hypothyroidism    Primary osteoarthritis of right knee    Osteopenia    Grade II diastolic dysfunction    Mitral valve insufficiency   [2]   Outpatient Medications Marked as Taking for the 4/30/25 encounter (Office Visit) with Shelby Yarbrough MD   Medication Sig    HYDRALAZINE 25 MG Oral Tab TAKE 1 TABLET(25 MG) BY MOUTH WITH BREAKFAST AND WITH LUNCH AS DIRECTED    hydroCHLOROthiazide 12.5 MG Oral Cap Take 1 capsule (12.5 mg total) by mouth daily.    ATORVASTATIN 10 MG Oral Tab TAKE 1 TABLET(10 MG) BY MOUTH EVERY MORNING    Ferrous Sulfate 325 (65 Fe) MG Oral Tab Take 1 tablet (325 mg total) by mouth Every Monday, Wednesday, and Friday.    levothyroxine 50 MCG Oral Tab Take 1 tablet (50 mcg total) by mouth As Directed. Before breakfast take 50mcg Monday through Friday. Take 75mcg on Saturday and Sunday.    levothyroxine 75 MCG Oral Tab Take 1 tablet (75 mcg total) by mouth As Directed. Take 75mcg on Saturday and Sundays    LISINOPRIL 40 MG Oral Tab TAKE 1 TABLET BY MOUTH EVERY DAY    MELOXICAM 7.5 MG Oral Tab TAKE 1 TABLET(7.5 MG) BY MOUTH DAILY    alendronate 35 MG Oral  Tab Take 1 tablet (35 mg total) by mouth every 7 days.    METOPROLOL SUCCINATE  MG Oral Tablet 24 Hr TAKE 1 TABLET(100 MG) BY MOUTH DAILY    aspirin (ASPIRIN 81) 81 MG Oral Tab EC Take 1 tablet (81 mg total) by mouth daily.    Turmeric (QC TUMERIC COMPLEX OR) Take by mouth.    Vitamin D3, Cholecalciferol, 25 MCG (1000 UT) Oral Tab Take 1 tablet (1,000 Units total) by mouth daily.    Ascorbic Acid (VITAMIN C) 100 MG Oral Tab Take 1 tablet (100 mg total) by mouth daily.    Multiple Vitamin (MULTIVITAMIN ADULT) Oral Tab Take by mouth daily.    Glucosamine-Chondroit-Vit C-Mn (GLUCOSAMINE-CHONDROITIN) Oral Tab Take 1 tablet by mouth 2 (two) times a day.    Omega-3 Fatty Acids (FISH OIL OR) Take by mouth daily.

## 2025-04-30 NOTE — TELEPHONE ENCOUNTER
Requesting    Name from pharmacy: METOPROLOL ER SUCCINATE 100MG TABS         Will file in chart as: METOPROLOL SUCCINATE  MG Oral Tablet 24 Hr    Sig: TAKE 1 TABLET(100 MG) BY MOUTH DAILY    Disp: 90 tablet    Refills: 3 (Pharmacy requested: Not specified)    Start: 4/30/2025    Class: Normal    Non-formulary For: Essential hypertension    Last ordered: 10 months ago (6/26/2024) by Shelby Garduno MD    Last refill: 4/4/2025    Rx #: [id number]    Hypertension Medications Protocol Fizuoi4604/30/2025 05:12 AM   Protocol Details Last BP reading less than 140/90    EGFRCR or GFRNAA > 50    CMP or BMP in past 12 months    In person appointment or virtual visit in the past 12 mos or appointment in next 3 mos    Medication is active on med list      To be filled at: [id number] - [place], [place] - [address] AT [facility] [address], [phone], [phone]     LOV: 04/30/25 w/ DVF  RTC: 08/07/25  Last Relevant Labs: 02/07/25    Future Appointments   Date Time Provider Department Center   8/5/2025 11:00 AM Shelby Yarbrough MD [facility] [place]

## 2025-06-09 ENCOUNTER — HOSPITAL ENCOUNTER (OUTPATIENT)
Age: 85
Discharge: HOME | End: 2025-06-09
Payer: MEDICARE

## 2025-06-09 ENCOUNTER — TELEPHONE (OUTPATIENT)
Facility: CLINIC | Age: 85
End: 2025-06-09

## 2025-06-09 ENCOUNTER — APPOINTMENT (OUTPATIENT)
Dept: GENERAL RADIOLOGY | Age: 85
End: 2025-06-09
Attending: NURSE PRACTITIONER
Payer: MEDICARE

## 2025-06-09 VITALS
RESPIRATION RATE: 18 BRPM | TEMPERATURE: 97 F | OXYGEN SATURATION: 98 % | SYSTOLIC BLOOD PRESSURE: 147 MMHG | HEART RATE: 61 BPM | HEIGHT: 61 IN | DIASTOLIC BLOOD PRESSURE: 48 MMHG | WEIGHT: 129 LBS | BODY MASS INDEX: 24.35 KG/M2

## 2025-06-09 DIAGNOSIS — S99.921A INJURY OF TOE ON RIGHT FOOT, INITIAL ENCOUNTER: Primary | ICD-10-CM

## 2025-06-09 PROCEDURE — 73660 X-RAY EXAM OF TOE(S): CPT | Performed by: NURSE PRACTITIONER

## 2025-06-09 PROCEDURE — 99215 OFFICE O/P EST HI 40 MIN: CPT

## 2025-06-09 PROCEDURE — 90471 IMMUNIZATION ADMIN: CPT

## 2025-06-09 PROCEDURE — 99204 OFFICE O/P NEW MOD 45 MIN: CPT

## 2025-06-09 RX ORDER — ACETAMINOPHEN 500 MG
1000 TABLET ORAL ONCE
Status: COMPLETED | OUTPATIENT
Start: 2025-06-09 | End: 2025-06-09

## 2025-06-09 NOTE — ED PROVIDER NOTES
Patient Seen in: [facility]        History  No chief complaint on file.    Stated Complaint: Left great toe pain    Subjective:   This is an 84-year-old female with below stated medical history.  Presents to immediate care for right great toe injury.  Reports she accidentally kicked a piece of furniture and her toenail is loose.  Tetanus is not up-to-date.  No fall to the ground or head injury.  No neck or back pain.  No treatment attempted prior to arrival.    The history is provided by the patient.                     Objective:     Past Medical History:    Atypical chest pain    (-) cardiolite 9/09    Elevated fasting glucose    (-) gtt 8/12    High cholesterol    Hypertension    Osteopenia    Other and unspecified hyperlipidemia    Unspecified essential hypertension              Past Surgical History:   Procedure Laterality Date    Appendectomy      Other surgical history      goiter removed 1983                Social History     Socioeconomic History    Marital status:    Tobacco Use    Smoking status: Never    Smokeless tobacco: Never    Tobacco comments:     Updated 9/11/24   Vaping Use    Vaping status: Never Used   Substance and Sexual Activity    Alcohol use: No    Drug use: No   Other Topics Concern    Caffeine Concern Yes    Exercise Yes    Seat Belt Yes     Social Drivers of Health     Food Insecurity: No Food Insecurity (4/30/2025)    NCSS - Food Insecurity     Worried About Running Out of Food in the Last Year: No     Ran Out of Food in the Last Year: No   Transportation Needs: No Transportation Needs (4/30/2025)    NCSS - Transportation     Lack of Transportation: No   Housing Stability: At Risk (4/30/2025)    NCSS - Housing/Utilities     Has Housing: No     Worried About Losing Housing: Patient unable to answer     Unable to Get Utilities: No              Review of Systems   Constitutional:  Negative for chills and fever.   HENT:  Negative for congestion and sore throat.     Respiratory:  Negative for cough, shortness of breath and wheezing.    Cardiovascular:  Negative for chest pain, palpitations and leg swelling.   Gastrointestinal:  Negative for abdominal pain, constipation, diarrhea, nausea and vomiting.   Genitourinary:  Negative for dysuria.   Musculoskeletal:  Positive for arthralgias. Negative for back pain, neck pain and neck stiffness.   Skin:  Negative for rash.   Neurological:  Positive for headaches. Negative for dizziness and syncope.   Hematological:  Does not bruise/bleed easily.       Positive for stated complaint: Left great toe pain  Other systems are as noted in HPI.  Constitutional and vital signs reviewed.      All other systems reviewed and negative except as noted above.                  Physical Exam    ED Triage Vitals [06/09/25 1612]   BP (!) 205/115   Pulse 61   Resp 18   Temp 97.3 °F (36.3 °C)   Temp src Oral   SpO2 98 %   O2 Device None (Room air)       Current Vitals:   Vital Signs  BP: 147/48  Pulse: 61  Resp: 18  Temp: 97.3 °F (36.3 °C)  Temp src: Oral    Oxygen Therapy  SpO2: 98 %  O2 Device: None (Room air)            Physical Exam  Vitals and nursing note reviewed.   Constitutional:       General: She is not in acute distress.     Appearance: Normal appearance. She is not ill-appearing, toxic-appearing or diaphoretic.   HENT:      Head: Normocephalic and atraumatic.      Right Ear: External ear normal.      Left Ear: External ear normal.      Nose: Nose normal.      Mouth/Throat:      Mouth: Mucous membranes are moist.      Pharynx: Oropharynx is clear.   Eyes:      General:         Right eye: No discharge.         Left eye: No discharge.      Extraocular Movements: Extraocular movements intact.      Conjunctiva/sclera: Conjunctivae normal.   Cardiovascular:      Rate and Rhythm: Normal rate.   Pulmonary:      Effort: Pulmonary effort is normal.   Musculoskeletal:      Cervical back: Neck supple.      Right lower leg: No edema.      Left lower leg:  No edema.      Right foot: Decreased range of motion. Normal capillary refill. Swelling and tenderness present. No deformity, bunion, Charcot foot, foot drop, prominent metatarsal heads, laceration, bony tenderness or crepitus. Normal pulse.      Comments: Right great toe, nail is  from the nailbed.  No obvious deformity.  Distal CMS intact.    Skin:     General: Skin is warm and dry.      Capillary Refill: Capillary refill takes less than 2 seconds.      Findings: No rash.   Neurological:      General: No focal deficit present.      Mental Status: She is alert and oriented to person, place, and time.   Psychiatric:         Mood and Affect: Mood normal.         Behavior: Behavior normal.                 ED Course  Labs Reviewed - No data to display       X-ray of the left great toe, tetanus update wound care,                  MDM     Patient is found to be hypertensive on initial vital signs.  She does complain of headache.  States this started after her toe injury.  Adamantly denies head injury.  Reports her blood pressure this morning was 135/70.      Differential diagnosis includes but is not limited to open fracture, nailbed injury, hypertensive crisis, less likely intracranial bleeding    Right great toenail is  from the nailbed but attached at the nail matrix.  There is no obvious deformity to the toe.  Distal CMS intact.    X-ray films interpreted and reviewed by myself.  Results show no evidence of acute fracture.    Area was cleansed with normal saline.  Tetanus was updated.  Toenail was tacked down with dressing.    Patient took her afternoon dose of hydralazine.  Blood pressure was reevaluated and is normal.  Patient states headache is 100% resolved.  No further suspicion for hypertensive crisis or intracranial bleeding.    Clinical impression is toenail injury    DC home.  Advised to keep nail tacked down.  Follow-up with podiatry on Wednesday as scheduled.  Reasons to return reviewed.   Patient and her daughter verbalized understanding, and agreed with plan of care.  All questions answered.         Medical Decision Making      Disposition and Plan     Clinical Impression:  1. Injury of toe on right foot, initial encounter         Disposition:  Discharge  6/9/2025  6:13 pm    Follow-up:  Your podiatrist                Medications Prescribed:  Current Discharge Medication List                Supplementary Documentation:

## 2025-06-09 NOTE — DISCHARGE INSTRUCTIONS
Please keep your toenail tacked down.  Keep area clean and dry.  Follow-up with your podiatrist on Wednesday as scheduled.

## 2025-06-09 NOTE — TELEPHONE ENCOUNTER
Pt dropped a chair on her foot and she injured her big toe. The nail on the big toe is loose and bloody.The foot is painful and swollen. Pt daughter Antonietta will take her to immediate care for treatment and pt wants to f/u with Dr Burroughs. Please contact pt daughter to schedule a f/u appt [phone]  Future Appointments   Date Time Provider Department Center   8/5/2025 11:00 AM Shelby Yarbrough MD [facility] 8 [facility]

## 2025-06-09 NOTE — TELEPHONE ENCOUNTER
S/w patient's daughter- She states that patient dropped chair on her toe and it led to loosening of the nail. Daughter endorses bleeding as well. She states that they are on the way to urgent care to be assessed. She was wondering if they could have appointment later this week for follow-up after urgent care follow up. I offered 2:15 on 6/11 and she accepted.    EDGARD

## 2025-06-11 ENCOUNTER — OFFICE VISIT (OUTPATIENT)
Dept: PODIATRY CLINIC | Facility: CLINIC | Age: 85
End: 2025-06-11
Payer: MEDICARE

## 2025-06-11 VITALS — DIASTOLIC BLOOD PRESSURE: 62 MMHG | SYSTOLIC BLOOD PRESSURE: 126 MMHG

## 2025-06-11 DIAGNOSIS — L60.1 TRAUMATIC ONYCHOLYSIS: Primary | ICD-10-CM

## 2025-06-11 PROCEDURE — 11730 AVULSION NAIL PLATE SIMPLE 1: CPT | Performed by: PODIATRIST

## 2025-06-11 RX ORDER — MUPIROCIN 20 MG/G
OINTMENT TOPICAL
Qty: 30 G | Refills: 0 | Status: SHIPPED | OUTPATIENT
Start: 2025-06-11

## 2025-06-13 NOTE — PROGRESS NOTES
Gerda Crowder is a 84 year old female.   Chief Complaint   Patient presents with    Follow - Up     Left foot toenail fungus          HPI:   Patient returns to the clinic as follow-up from urgent care she dropped something heavy on her toenail.  It loosened and started to come off she went to urgent care she is here for follow-up on that issue.  At today's visit reviewed nurse's history as taken above, allergies medications and medical history as documented below.  All changes duly noted  Allergies: Patient has no known allergies.   Current Medications[1]   Past Medical History[2]   Past Surgical History[3]   Family History[4]   Social Hx on file[5]        REVIEW OF SYSTEMS:   Today reviewed systens as documented below  GENERAL HEALTH: feels well otherwise  SKIN: Refer to exam below  RESPIRATORY: denies shortness of breath with exertion  CARDIOVASCULAR: denies chest pain on exertion  GI: denies abdominal pain and denies heartburn  NEURO: denies headaches    EXAM:   /62 (BP Location: Left arm, Patient Position: Sitting, Cuff Size: adult)   GENERAL: well developed, well nourished, in no apparent distress  EXTREMITIES:   1. Integument: The skin in the left foot is intact.  The patient does have loosening of the left hallux nail that is almost completely off.   2. Vascular: Patient has palpable pulses dorsalis pedis posterior tibial on the left   3. Neurologic: Has intact sensorium on the left   4. Musculoskeletal: Good muscle strength.    ASSESSMENT AND PLAN:   Diagnoses and all orders for this visit:    Traumatic onycholysis    Other orders  -     amoxicillin clavulanate 875-125 MG Oral Tab; Take 1 tablet by mouth 2 (two) times daily.  -     mupirocin 2 % External Ointment; Apply a small amount to the affected nail edge twice daily after soaking and cover with a Band-Aid,        Plan: Today I consented the patient to remove the nail from the left hallux.  The procedure was described to her in detail she would  get on antibiotics for a week along with foot soaks for a couple of weeks and follow-up with us in 2 weeks.  Possible complications and risks reviewed questions answered patient wished to proceed and the consent was signed.    Preprocedure diagnoses: Traumatic onycholysis left hallux  Postprocedure diagnoses: Same  Procedure: Left hallux nail avulsion    Technique: At today's office visit the patient was consented for the above procedure.  Appropriate timeout was taken and the procedure was carried out as follows:  The area was prepped and draped using usual aseptic technique.  The left hallux was anesthetized utilizing 5 cc of 0.5% Marcaine plain.  The nail was very loose it was removed utilizing a hemostat there was minimal bleeding that was treated with Lumicain antibiotic ointment gauze and a Coban wrap was applied for mild compression she will remove it tomorrow begin warm soapy soaks 15 minutes twice daily apply antibiotic ointment specifically mupirocin and a Band-Aid.  I will see her in 2 weeks.  She was told the nail will grow back.  She is also placed on Augmentin for 1 week.    The patient indicates understanding of these issues and agrees to the plan.    Kd Burroughs DPM       [1]   Current Outpatient Medications   Medication Sig Dispense Refill    amoxicillin clavulanate 875-125 MG Oral Tab Take 1 tablet by mouth 2 (two) times daily. 30 tablet 0    mupirocin 2 % External Ointment Apply a small amount to the affected nail edge twice daily after soaking and cover with a Band-Aid, 30 g 0    METOPROLOL SUCCINATE  MG Oral Tablet 24 Hr TAKE 1 TABLET(100 MG) BY MOUTH DAILY 90 tablet 3    levothyroxine 75 MCG Oral Tab Take 1 tablet (75 mcg total) by mouth As Directed. Take 75mcg on Saturday and Sundays 24 tablet 3    HYDRALAZINE 25 MG Oral Tab TAKE 1 TABLET(25 MG) BY MOUTH WITH BREAKFAST AND WITH LUNCH AS DIRECTED 180 tablet 1    hydroCHLOROthiazide 12.5 MG Oral Cap Take 1 capsule (12.5 mg total) by  mouth daily. 90 capsule 1    ATORVASTATIN 10 MG Oral Tab TAKE 1 TABLET(10 MG) BY MOUTH EVERY MORNING 90 tablet 3    Ferrous Sulfate 325 (65 Fe) MG Oral Tab Take 1 tablet (325 mg total) by mouth Every Monday, Wednesday, and Friday. 90 tablet 2    levothyroxine 50 MCG Oral Tab Take 1 tablet (50 mcg total) by mouth As Directed. Before breakfast take 50mcg Monday through Friday. Take 75mcg on Saturday and Sunday. 90 tablet 1    LISINOPRIL 40 MG Oral Tab TAKE 1 TABLET BY MOUTH EVERY DAY 90 tablet 3    MELOXICAM 7.5 MG Oral Tab TAKE 1 TABLET(7.5 MG) BY MOUTH DAILY 90 tablet 0    alendronate 35 MG Oral Tab Take 1 tablet (35 mg total) by mouth every 7 days. 12 tablet 3    aspirin (ASPIRIN 81) 81 MG Oral Tab EC Take 1 tablet (81 mg total) by mouth daily. 90 tablet 3    Turmeric (QC TUMERIC COMPLEX OR) Take by mouth.      Vitamin D3, Cholecalciferol, 25 MCG (1000 UT) Oral Tab Take 1 tablet (1,000 Units total) by mouth daily.      Ascorbic Acid (VITAMIN C) 100 MG Oral Tab Take 1 tablet (100 mg total) by mouth daily.      Multiple Vitamin (MULTIVITAMIN ADULT) Oral Tab Take by mouth daily.      Glucosamine-Chondroit-Vit C-Mn (GLUCOSAMINE-CHONDROITIN) Oral Tab Take 1 tablet by mouth 2 (two) times a day.      Omega-3 Fatty Acids (FISH OIL OR) Take by mouth daily.     [2]   Past Medical History:   Atypical chest pain    (-) cardiolite 9/09    Elevated fasting glucose    (-) gtt 8/12    High cholesterol    Hypertension    Osteopenia    Other and unspecified hyperlipidemia    Unspecified essential hypertension   [3]   Past Surgical History:  Procedure Laterality Date    Appendectomy      Other surgical history      goiter removed 1983   [4]   Family History  Problem Relation Age of Onset    Hypertension Father     Hypertension Mother     Diabetes Sister    [5]   Social History  Socioeconomic History    Marital status:    Tobacco Use    Smoking status: Never    Smokeless tobacco: Never    Tobacco comments:     Updated 9/11/24    Vaping Use    Vaping status: Never Used   Substance and Sexual Activity    Alcohol use: No    Drug use: No   Other Topics Concern    Caffeine Concern Yes    Exercise Yes    Seat Belt Yes

## 2025-06-14 RX ORDER — ASPIRIN 81 MG/1
81 TABLET, COATED ORAL DAILY
Qty: 90 TABLET | Refills: 3 | Status: SHIPPED | OUTPATIENT
Start: 2025-06-14

## 2025-06-14 NOTE — TELEPHONE ENCOUNTER
Requesting    Name from pharmacy: ASPIRIN 81MG EC LOW DOSE TABLETS         Will file in chart as: ASPIRIN LOW DOSE 81 MG Oral Tab EC    Sig: TAKE 1 TABLET BY MOUTH DAILY    Disp: 90 tablet    Refills: 3 (Pharmacy requested: Not specified)    Start: 6/13/2025    Class: Normal    Non-formulary    Last ordered: 1 year ago (6/12/2024) by Shelby Garduno MD    Last refill: 3/10/2025    Rx #: [id number]    Aspirin Protocol Passed 06/13/2025 05:03 PM   Protocol Details In person appointment or virtual visit in the past 6 mos or appointment in next 3 mos    Medication is active on med list      To be filled at: [id number] - [place], [place] - [facility] [address] [facility] [address] [phone], [phone]     LOV: 04/30/25 w/ DVF  RTC: 08/07/25   Last Relevant Labs: 02/07/25    Future Appointments   Date Time Provider Department Center   7/1/2025  1:45 PM Kd Burroughs DPM [id number]   8/5/2025 11:00 AM Shelby Yarbrough MD [facility] 8 [facility]

## 2025-06-19 DIAGNOSIS — M85.80 OSTEOPENIA, UNSPECIFIED LOCATION: ICD-10-CM

## 2025-06-20 RX ORDER — ALENDRONATE SODIUM 35 MG/1
35 TABLET ORAL
Qty: 12 TABLET | Refills: 3 | Status: SHIPPED | OUTPATIENT
Start: 2025-06-20

## 2025-07-01 ENCOUNTER — OFFICE VISIT (OUTPATIENT)
Dept: PODIATRY CLINIC | Facility: CLINIC | Age: 85
End: 2025-07-01

## 2025-07-01 DIAGNOSIS — Z98.890 STATUS POST NAIL SURGERY: Primary | ICD-10-CM

## 2025-07-01 PROCEDURE — 99213 OFFICE O/P EST LOW 20 MIN: CPT | Performed by: PODIATRIST

## 2025-07-01 NOTE — PROGRESS NOTES
Gerda Crowder is a 84 year old female.   Chief Complaint   Patient presents with    Toenail Care     Pt in for f/u from left great toe ingrown procedure- doing well today         HPI:   Returns to the clinic for follow-up on her left great toe she is finished the antibiotics she is doing very well she does not have any residual pain or discomfort.  At today's visit reviewed nurse's history as taken above, allergies medications and medical history as documented below.  All changes duly noted  Allergies: Patient has no known allergies.   Current Medications[1]   Past Medical History[2]   Past Surgical History[3]   Family History[4]   Social Hx on file[5]        REVIEW OF SYSTEMS:   Today reviewed systens as documented below  GENERAL HEALTH: feels well otherwise  SKIN: Refer to exam below  RESPIRATORY: denies shortness of breath with exertion  CARDIOVASCULAR: denies chest pain on exertion  GI: denies abdominal pain and denies heartburn  NEURO: denies headaches    EXAM:   There were no vitals taken for this visit.  GENERAL: well developed, well nourished, in no apparent distress  EXTREMITIES:   1. Integument: The skin on the left hallux nail shows there is only some mild scar tissue along the medial edge which was filed down utilizing a rotary bur today.  There is no further sign of infection   2. Vascular: Patient has palpable pulses   3. Neurologic: Patient has intact sensorium there is no deficits noted   4. Musculoskeletal: Patient has good muscle strength and no change    ASSESSMENT AND PLAN:   Diagnoses and all orders for this visit:    Status post nail surgery        Plan: Recommended we wait for the nail to start growing out I will see the patient again in about 3 to 4 months if any other therapy is needed then may have to take a fungal culture.    The patient indicates understanding of these issues and agrees to the plan.    Kd Burroughs DPM       [1]   Current Outpatient Medications   Medication Sig  Dispense Refill    ALENDRONATE 35 MG Oral Tab TAKE 1 TABLET(35 MG) BY MOUTH EVERY 7 DAYS 12 tablet 3    ASPIRIN LOW DOSE 81 MG Oral Tab EC TAKE 1 TABLET BY MOUTH DAILY 90 tablet 3    amoxicillin clavulanate 875-125 MG Oral Tab Take 1 tablet by mouth 2 (two) times daily. 30 tablet 0    mupirocin 2 % External Ointment Apply a small amount to the affected nail edge twice daily after soaking and cover with a Band-Aid, 30 g 0    METOPROLOL SUCCINATE  MG Oral Tablet 24 Hr TAKE 1 TABLET(100 MG) BY MOUTH DAILY 90 tablet 3    levothyroxine 75 MCG Oral Tab Take 1 tablet (75 mcg total) by mouth As Directed. Take 75mcg on Saturday and Sundays 24 tablet 3    HYDRALAZINE 25 MG Oral Tab TAKE 1 TABLET(25 MG) BY MOUTH WITH BREAKFAST AND WITH LUNCH AS DIRECTED 180 tablet 1    hydroCHLOROthiazide 12.5 MG Oral Cap Take 1 capsule (12.5 mg total) by mouth daily. 90 capsule 1    ATORVASTATIN 10 MG Oral Tab TAKE 1 TABLET(10 MG) BY MOUTH EVERY MORNING 90 tablet 3    Ferrous Sulfate 325 (65 Fe) MG Oral Tab Take 1 tablet (325 mg total) by mouth Every Monday, Wednesday, and Friday. 90 tablet 2    levothyroxine 50 MCG Oral Tab Take 1 tablet (50 mcg total) by mouth As Directed. Before breakfast take 50mcg Monday through Friday. Take 75mcg on Saturday and Sunday. 90 tablet 1    LISINOPRIL 40 MG Oral Tab TAKE 1 TABLET BY MOUTH EVERY DAY 90 tablet 3    Turmeric (QC TUMERIC COMPLEX OR) Take by mouth.      Vitamin D3, Cholecalciferol, 25 MCG (1000 UT) Oral Tab Take 1 tablet (1,000 Units total) by mouth daily.      Ascorbic Acid (VITAMIN C) 100 MG Oral Tab Take 1 tablet (100 mg total) by mouth daily.      Multiple Vitamin (MULTIVITAMIN ADULT) Oral Tab Take by mouth daily.      Glucosamine-Chondroit-Vit C-Mn (GLUCOSAMINE-CHONDROITIN) Oral Tab Take 1 tablet by mouth 2 (two) times a day.      Omega-3 Fatty Acids (FISH OIL OR) Take by mouth daily.     [2]   Past Medical History:   Atypical chest pain    (-) cardiolite 9/09    Elevated fasting  glucose    (-) gtt 8/12    High cholesterol    Hypertension    Osteopenia    Other and unspecified hyperlipidemia    Unspecified essential hypertension   [3]   Past Surgical History:  Procedure Laterality Date    Appendectomy      Other surgical history      goiter removed 1983   [4]   Family History  Problem Relation Age of Onset    Hypertension Father     Hypertension Mother     Diabetes Sister    [5]   Social History  Socioeconomic History    Marital status:    Tobacco Use    Smoking status: Never    Smokeless tobacco: Never    Tobacco comments:     Updated 9/11/24   Vaping Use    Vaping status: Never Used   Substance and Sexual Activity    Alcohol use: No    Drug use: No   Other Topics Concern    Caffeine Concern Yes    Exercise Yes    Seat Belt Yes

## 2025-08-04 ENCOUNTER — LAB ENCOUNTER (OUTPATIENT)
Dept: LAB | Age: 85
End: 2025-08-04
Attending: INTERNAL MEDICINE

## 2025-08-04 DIAGNOSIS — E03.8 OTHER SPECIFIED HYPOTHYROIDISM: Chronic | ICD-10-CM

## 2025-08-04 DIAGNOSIS — I10 HYPERTENSION, UNSPECIFIED TYPE: Chronic | ICD-10-CM

## 2025-08-04 DIAGNOSIS — E78.5 HYPERLIPIDEMIA, UNSPECIFIED HYPERLIPIDEMIA TYPE: Chronic | ICD-10-CM

## 2025-08-04 DIAGNOSIS — Z51.81 MEDICATION MONITORING ENCOUNTER: ICD-10-CM

## 2025-08-04 DIAGNOSIS — R94.6 ABNORMAL THYROID FUNCTION TEST: ICD-10-CM

## 2025-08-04 DIAGNOSIS — R73.03 PREDIABETES: Chronic | ICD-10-CM

## 2025-08-04 LAB
ALBUMIN SERPL-MCNC: 4.5 G/DL (ref 3.2–4.8)
ALBUMIN/GLOB SERPL: 1.7 (ref 1–2)
ALP LIVER SERPL-CCNC: 34 U/L (ref 55–142)
ALT SERPL-CCNC: 20 U/L (ref 10–49)
ANION GAP SERPL CALC-SCNC: 8 MMOL/L (ref 0–18)
AST SERPL-CCNC: 27 U/L (ref ?–34)
BILIRUB SERPL-MCNC: 0.8 MG/DL (ref 0.2–1.1)
BUN BLD-MCNC: 15 MG/DL (ref 9–23)
CALCIUM BLD-MCNC: 9.3 MG/DL (ref 8.7–10.6)
CHLORIDE SERPL-SCNC: 104 MMOL/L (ref 98–112)
CO2 SERPL-SCNC: 29 MMOL/L (ref 21–32)
CREAT BLD-MCNC: 1.02 MG/DL (ref 0.55–1.02)
EGFRCR SERPLBLD CKD-EPI 2021: 55 ML/MIN/1.73M2 (ref 60–?)
EST. AVERAGE GLUCOSE BLD GHB EST-MCNC: 120 MG/DL (ref 68–126)
FASTING STATUS PATIENT QL REPORTED: NO
GLOBULIN PLAS-MCNC: 2.7 G/DL (ref 2–3.5)
GLUCOSE BLD-MCNC: 112 MG/DL (ref 70–99)
HBA1C MFR BLD: 5.8 % (ref ?–5.7)
OSMOLALITY SERPL CALC.SUM OF ELEC: 294 MOSM/KG (ref 275–295)
POTASSIUM SERPL-SCNC: 4.2 MMOL/L (ref 3.5–5.1)
PROT SERPL-MCNC: 7.2 G/DL (ref 5.7–8.2)
SODIUM SERPL-SCNC: 141 MMOL/L (ref 136–145)
T3FREE SERPL-MCNC: 3.08 PG/ML (ref 2.4–4.2)
T4 FREE SERPL-MCNC: 2.3 NG/DL (ref 0.8–1.7)
TSI SER-ACNC: 1.34 UIU/ML (ref 0.55–4.78)

## 2025-08-04 PROCEDURE — 84481 FREE ASSAY (FT-3): CPT

## 2025-08-04 PROCEDURE — 84443 ASSAY THYROID STIM HORMONE: CPT

## 2025-08-04 PROCEDURE — 36415 COLL VENOUS BLD VENIPUNCTURE: CPT

## 2025-08-04 PROCEDURE — 84439 ASSAY OF FREE THYROXINE: CPT

## 2025-08-04 PROCEDURE — 83036 HEMOGLOBIN GLYCOSYLATED A1C: CPT

## 2025-08-04 PROCEDURE — 80053 COMPREHEN METABOLIC PANEL: CPT

## 2025-08-05 ENCOUNTER — OFFICE VISIT (OUTPATIENT)
Dept: INTERNAL MEDICINE CLINIC | Facility: CLINIC | Age: 85
End: 2025-08-05

## 2025-08-05 VITALS
OXYGEN SATURATION: 97 % | HEART RATE: 61 BPM | DIASTOLIC BLOOD PRESSURE: 60 MMHG | BODY MASS INDEX: 24 KG/M2 | SYSTOLIC BLOOD PRESSURE: 120 MMHG | WEIGHT: 124.81 LBS | TEMPERATURE: 98 F

## 2025-08-05 DIAGNOSIS — I10 HYPERTENSION, UNSPECIFIED TYPE: Primary | Chronic | ICD-10-CM

## 2025-08-05 DIAGNOSIS — R94.6 ABNORMAL THYROID FUNCTION TEST: ICD-10-CM

## 2025-08-05 DIAGNOSIS — I51.89 GRADE II DIASTOLIC DYSFUNCTION: ICD-10-CM

## 2025-08-05 DIAGNOSIS — R73.03 PREDIABETES: Chronic | ICD-10-CM

## 2025-08-05 DIAGNOSIS — E03.8 OTHER SPECIFIED HYPOTHYROIDISM: Chronic | ICD-10-CM

## 2025-08-05 PROCEDURE — 99214 OFFICE O/P EST MOD 30 MIN: CPT | Performed by: INTERNAL MEDICINE

## 2025-08-05 PROCEDURE — G2211 COMPLEX E/M VISIT ADD ON: HCPCS | Performed by: INTERNAL MEDICINE

## 2025-08-20 ENCOUNTER — LAB ENCOUNTER (OUTPATIENT)
Dept: LAB | Age: 85
End: 2025-08-20
Attending: INTERNAL MEDICINE

## 2025-08-20 DIAGNOSIS — E03.8 OTHER SPECIFIED HYPOTHYROIDISM: Chronic | ICD-10-CM

## 2025-08-20 DIAGNOSIS — R94.6 ABNORMAL THYROID FUNCTION TEST: ICD-10-CM

## 2025-08-20 LAB
BASOPHILS # BLD AUTO: 0.05 X10(3) UL (ref 0–0.2)
BASOPHILS NFR BLD AUTO: 1 %
EOSINOPHIL # BLD AUTO: 0.21 X10(3) UL (ref 0–0.7)
EOSINOPHIL NFR BLD AUTO: 4.2 %
ERYTHROCYTE [DISTWIDTH] IN BLOOD BY AUTOMATED COUNT: 12.6 %
HCT VFR BLD AUTO: 36.9 % (ref 35–48)
HGB BLD-MCNC: 12 G/DL (ref 12–16)
IMM GRANULOCYTES # BLD AUTO: 0.01 X10(3) UL (ref 0–1)
IMM GRANULOCYTES NFR BLD: 0.2 %
LYMPHOCYTES # BLD AUTO: 1.48 X10(3) UL (ref 1–4)
LYMPHOCYTES NFR BLD AUTO: 29.4 %
MCH RBC QN AUTO: 33.3 PG (ref 26–34)
MCHC RBC AUTO-ENTMCNC: 32.5 G/DL (ref 31–37)
MCV RBC AUTO: 102.5 FL (ref 80–100)
MONOCYTES # BLD AUTO: 0.66 X10(3) UL (ref 0.1–1)
MONOCYTES NFR BLD AUTO: 13.1 %
NEUTROPHILS # BLD AUTO: 2.62 X10 (3) UL (ref 1.5–7.7)
NEUTROPHILS # BLD AUTO: 2.62 X10(3) UL (ref 1.5–7.7)
NEUTROPHILS NFR BLD AUTO: 52.1 %
PLATELET # BLD AUTO: 205 10(3)UL (ref 150–450)
RBC # BLD AUTO: 3.6 X10(6)UL (ref 3.8–5.3)
T3FREE SERPL-MCNC: 3.13 PG/ML (ref 2.4–4.2)
T4 FREE SERPL-MCNC: 2.2 NG/DL (ref 0.8–1.7)
TSI SER-ACNC: 0.74 UIU/ML (ref 0.55–4.78)
WBC # BLD AUTO: 5 X10(3) UL (ref 4–11)

## 2025-08-20 PROCEDURE — 36415 COLL VENOUS BLD VENIPUNCTURE: CPT

## 2025-08-20 PROCEDURE — 85025 COMPLETE CBC W/AUTO DIFF WBC: CPT

## 2025-08-20 PROCEDURE — 84443 ASSAY THYROID STIM HORMONE: CPT

## 2025-08-20 PROCEDURE — 84439 ASSAY OF FREE THYROXINE: CPT

## 2025-08-20 PROCEDURE — 84481 FREE ASSAY (FT-3): CPT

## (undated) DIAGNOSIS — M22.40 CHONDROMALACIA OF PATELLA, UNSPECIFIED LATERALITY: Primary | ICD-10-CM

## (undated) DIAGNOSIS — M17.11 PRIMARY OSTEOARTHRITIS OF RIGHT KNEE: ICD-10-CM

## (undated) DIAGNOSIS — E78.5 HYPERLIPIDEMIA, UNSPECIFIED HYPERLIPIDEMIA TYPE: Chronic | ICD-10-CM

## (undated) DIAGNOSIS — E03.8 OTHER SPECIFIED HYPOTHYROIDISM: Primary | ICD-10-CM

## (undated) DIAGNOSIS — R35.0 FREQUENT URINATION: Primary | ICD-10-CM

## (undated) DIAGNOSIS — R35.0 FREQUENT URINATION: ICD-10-CM

## (undated) NOTE — LETTER
Date: 4/30/2025    Patient Name: Gerda Crowder          To Whom it may concern:    This letter has been written at the patient's request. The above patient was seen at [facility] for treatment of a medical condition.    Gerda Crowder was here today for a medical wellness visit.     Sincerely,      Shelby Garduno MD

## (undated) NOTE — LETTER
AUTHORIZATION FOR SURGICAL OPERATION OR OTHER PROCEDURE    1. I hereby authorize Dr. Kd Burroughs , and [facility] staff assigned to my case to perform the following operation and/or procedure at the [facility] site:    _______________________________________________________________________________________________    Nail avulsion Left Hallux   _______________________________________________________________________________________________    2.  My physician has explained the nature and purpose of the operation or other procedure, possible alternative methods of treatment, the risks involved, and the possibility of complication to me.  I acknowledge that no guarantee has been made as to the result that may be obtained.  3.  I recognize that, during the course of this operation, or other procedure, unforseen conditions may necessitate additional or different procedure than those listed above.  I, therefore, further authorize and request that the above named physician, his/her physician assistants or designees perform such procedures as are, in his/her professional opinion, necessary and desirable.  4.  Any tissue or organs removed in the operation or other procedure may be disposed of by and at the discretion of the [facility] and [facility].  5.  I understand that in the event of a medical emergency, I will be transported by local paramedics to [facility] or other hospital emergency department.  6.  I certify that I have read and fully understand the above consent to operation and/or other procedure.    7.  I acknowledge that my physician has explained sedation/analgesia administration to me including the risks and benefits.  I consent to the administration of sedation/analgesia as may be necessary or desirable in the judgement of my physician.    Witness signature: ___________________________________________________ Date:   ______/______/_____                    Time:  ________ A.M.  P.M.       Patient Name:  ______________________________________________________  (please print)      Patient signature:  ___________________________________________________             Relationship to Patient:           []  Parent    Responsible person                          []  Spouse  In case of minor or                    [] Other  _____________   Incompetent name:  __________________________________________________                               (please print)      _____________      Responsible person  In case of minor or  Incompetent signature:  _______________________________________________    Statement of Physician  My signature below affirms that prior to the time of the procedure, I have explained to the patient and/or his/her guardian, the risks and benefits involved in the proposed treatment and any reasonable alternative to the proposed treatment.  I have also explained the risks and benefits involved in the refusal of the proposed treatment and have answered the patient's questions.                        Date:  ______/______/_______  Provider                      Signature:  __________________________________________________________       Time:  ___________ A.M    P.M.

## (undated) NOTE — LETTER
[facility]     [x] NEW APPLICANT  [address] [place] [address]  [] RENEWAL            Persons with Disabilities Certification for Parking Placard  *This form is valid for three months from your physician's signature date for a Temporary Placard and six months for a Permanent Placard.    NOTE TO ALL DISABILITY LICENSE PLATE OWNERS:  If you have a disability license plate, you MUST complete the form and renew your placard.    DIRECTIONS: Both sides of this document must be signed and completed fully. All fields are required.   Applicants complete Part 1. If the applicant is a MINOR, then Parent/Guardian(s) MUST also complete Part 2. The applicant's medical professional MUSTcomplete Part 3. If the applicant is applying for meter-exempt parking, his/her medical professional MUST also complete Part 4.    PART 1:   Applicant Information (MUST have a valid [place] 's license and/or ID card)  I hereby certify  that I meet the definition of a person with a disability as provided in [facility] 5/1-159.1, and I certify  that my physical condition entitles me to the issuance of a Persons with Disabilities Parking Placard. By affixing my signature below, I understand that the parking placard may not be used unless I am the  or passenger of the vehicle.     *If a  , please provide a copy of your  showing proof of service.     Disability Parking Placard # (if any)     Full Name of Person with Disability (If minor, complete Part 2 also)    Gerda Crowder  Male/Female  female  Date of Birth   7/25/1940    Valid [place] ’s License or ID Card # of Applicant                                                                                                  [place] Address  [address] [address]    Mailing Address if Different from Above   Telephone Number  [phone] (home)  Email Address   [email or web address]  [place]? Yes/No  No     Signature of Person with Disability Today’s Date  6/12/2024     PART 2:   For Parent or Legal Guardian (MUST have a valid 's license and/or ID card)  I hereby certify that the above applicant is a minor and I have primary responsibility for his/her transportation. By affixing my signature below, I understand that the disability placard is issued to the person with the disability and may not be used unless I am transporting the disabled person in the vehicle.     Name of Parent or Legal Guardian   Relationship to Person with Disability   Valid [place] ’s License or ID Card #          [place] Address Apt/Unit# City State Zip   Telephone Number Email Address   Signature of Parent or Legal Guardian Today’s Date  6/12/2024     Warning: Any misuse of the disability parking placard/plates or making a false application may result in the revocation of the placard, a 12-month suspension or revocation of your drivers license, and a fine of up to $1,000.    Temporary Disabled Parking Placard Applications -- May be taken to any [facility] facility or mailed in.  Permanent Disables Parking Placard Applications -- MUST be mailed to the following address:  , Persons with Disabilities Placard Unit, [address] [place] [address], [address], [place] [address].    *If you have a permanent disability placard and would like a Persons with Disabilities License Plate, please visit your local  facility to apply. You will need your permanent placard number and current plate number or CHIDI.     Please complete Page 2 to ensure timely processing.    Printed by authority of the [facility]. July 2021 -- 1 -- VSD 62.28          Part 3: Medical Eligibility Standards and Medical Professionals Certification  As the medical professional(s) executing this document and verifying the nature of the applicant's disability, I understand that making a false representation of a  person's disability for the purposes of obtaining any type of a disabled parking placard may result in suspension or revocation of my license and a fine of up to $1,000. As a licensed physician, advanced practice nurse, optometrist, chiropractor or physician's assistant, I certify the applicant has a condition that constitutes him/her as a person with disabilities.   Length of Disability: (Check one)   [x]   Temporary Disability; the duration of this disability is _____6 months________________ (maximum 6 months)  []   Permanent Disability  []   Meter-Exempt Disability (Must complete and sign Part 4 also.)  Check all that apply (MUST check at least one):  []  Is restricted by a lung disease to such a degree that the person’s forced (respiratory) expiratory volume (FEV) for 1 second, when measured by spirometry, is less than 1 liter.  []   Uses a portable oxygen device.  []   Has a Class III or Class IV cardiac condition according to the standards set by the American Heart Association.  []   Cannot walk without the use of or assistance from a wheelchair, a walker, a crutch, a brace, a prosthetic device or another person.  []   Is severely limited in the ability to walk due to an arthritic, neurological, oncological or orthopedic condition.  []   Cannot walk 200 feet without stopping to rest because of one of the above five conditions.  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     Medical Professional’s  Printed Name*   Shelby Garduno MD Specialty  Internal medicine   Office Address   [facility], [address]  [facility] [address]  Dept: [phone]  Dept Fax: [phone]   Medical Professional’s Signature   State Professional License Number (NOT NPI#)  [id number] Today’s Date                  6/12/2024    Signature of Collaborating/Supervising Physician (if signed above by resident/assistant) Supervising State Professional License Number             PART 4: Medical Eligibility for Meter-Exempt Parking  The meter-exempt parking certification must be completed only when the applicant qualifies. To qualify, the applicant MUST have a VALID  [place] 's license, have an ambulatory disability described in Part 3, and also have one of the following conditions listed below.  Economic need is not a consideration for meter-exempt parking    The applicant is eligible for meter-exempt parking as provided by statue due to the following PERMANENT medical condition or disability:    Check all that apply:  [] Cannot manage, manipulate, or insert coins, or obtain tickets in parking meters/ticket machines due to lack of fine motor control of BOTH hands.  [] Cannot reach above his/her head to a height of 42 inches from the ground due to a lack of finger, hand or upper-extremity strength or mobility.  [] Cannot approach a parking meter due to his/her use of a wheelchair or other device for mobility.  [] Cannot walk more than 20 feet due to an orthopedic, neurological, cardiovascular, or lung condition in which the degree of debilitation is so severe that it almost completely impedes the ability to walk.  [] Missing a hand(s) or arm(s) or has permanently lost the use of a hand or arm.  [] Patient is under 18 years of age and incapable of driving.     Medical Professional’s Signature State Professional License Number (NOT NPI#)   Today’s Date                   6/12/2024    Signature of Collaborating/Supervising Physician (if signed above by resident/assistant) Supervising State Professional License Number     FOR  OFFICE USE ONLY     Parking Placard Number:  Expiration Date:   Issued By: Issued Date:

## (undated) NOTE — MR AVS SNAPSHOT
[place]  [address] [facility] [address]  [place] [address]  [phone]               Thank you for choosing us for your health care visit with Leila Julian MD.  We are glad to serve you and happy to provide you with this perez TAKE 1 TABLET BY MOUTH DAILY   Commonly known as:  LIPITOR           CALCIUM + D OR   1 TABLET DAILY           CENTRUM SILVER Tabs   Take 1 tablet by mouth daily. levofloxacin 500 MG Tabs   Take 1 tablet (500 mg total) by mouth daily.    Commonly

## (undated) NOTE — LETTER
11/29/19        [address] [facility]      Dear Donnie Schulte records indicate that you have outstanding lab work and or testing that was ordered for you and has not yet been completed:  Orders Placed T

## (undated) NOTE — MR AVS SNAPSHOT
[place]  [address] [address]  [address] [facility] [address] [phone]               Thank you for choosing us for your health care visit with Georgia Walter MD.  We are glad to serve you and happy to provide you with this perez Take 1 tablet by mouth once daily.                 Where to Get Your Medications      These medications were sent to [address], [facility] Fernando Hughes AT [facility], [phone], [phone]  [address]

## (undated) NOTE — MR AVS SNAPSHOT
[place]  [address] [facility] [address]  [address] [facility] [address] [phone]               Thank you for choosing us for your health care visit with Neeru Prakash MD.  We are glad to serve you and happy to provide you with this perez You can get these medications from any pharmacy     Bring a paper prescription for each of these medications    - guaiFENesin-codeine 100-10 MG/5ML Soln            Today's Orders     Rapid Strep    Complete by:  As directed    Assoc Dx:  Acute bronchitis,

## (undated) NOTE — LETTER
12/09/20        [address] [facility]      Dear Justin Kelsey records indicate that you have outstanding lab work and or testing that was ordered for you and has not yet been completed:  Orders Placed T

## (undated) NOTE — LETTER
12/02/21        [address] [facility]      Dear Meghan Hernandez records indicate that you have outstanding lab work and or testing that was ordered for you and has not yet been completed:  Orders Placed T

## (undated) NOTE — MR AVS SNAPSHOT
[place]  [facility]  [address] [facility] [address] [phone]               Thank you for choosing us for your health care visit with Natividad Aguirre MD.  We are glad to serve you and happy to provide you with this preez Take 1 tablet by mouth once daily. [employer]     Call the [employer] for assistance with your inactive [employer] account    If you have questions, you can call [phone] to talk to our [facility] Staff.  Remember, [employer] is NOT to be

## (undated) NOTE — LETTER
[facility]     [] NEW APPLICANT  [address] [place] [address]  [] RENEWAL            Persons with Disabilities Certification for Parking Placard  *This form is valid for three months from your physician's signature date for a Temporary Placard and six months for a Permanent Placard.    NOTE TO ALL DISABILITY LICENSE PLATE OWNERS:  If you have a disability license plate, you MUST complete the form and renew your placard.    DIRECTIONS: Both sides of this document must be signed and completed fully. All fields are required.   Applicants complete Part 1. If the applicant is a MINOR, then Parent/Guardian(s) MUST also complete Part 2. The applicant's medical professional MUSTcomplete Part 3. If the applicant is applying for meter-exempt parking, his/her medical professional MUST also complete Part 4.    PART 1:   Applicant Information (MUST have a valid [place] 's license and/or ID card)  I hereby certify  that I meet the definition of a person with a disability as provided in [facility] 5/1-159.1, and I certify  that my physical condition entitles me to the issuance of a Persons with Disabilities Parking Placard. By affixing my signature below, I understand that the parking placard may not be used unless I am the  or passenger of the vehicle.     *If a  , please provide a copy of your  showing proof of service.     Disability Parking Placard # (if any)     Full Name of Person with Disability (If minor, complete Part 2 also)    Gerda Crowder  Male/Female  female  Date of Birth   7/25/1940    Valid [place] ’s License or ID Card # of Applicant                                                                                                  [place] Address  [address] [address]    Mailing Address if Different from Above   Telephone Number  [phone] (home)  Email Address   [email or web address]  [email or web address]  Yes/No  No     Signature of Person with Disability Today’s Date  12/18/2024     PART 2:   For Parent or Legal Guardian (MUST have a valid 's license and/or ID card)  I hereby certify that the above applicant is a minor and I have primary responsibility for his/her transportation. By affixing my signature below, I understand that the disability placard is issued to the person with the disability and may not be used unless I am transporting the disabled person in the vehicle.     Name of Parent or Legal Guardian   Relationship to Person with Disability   Valid [place] ’s License or ID Card #          [place] Address Apt/Unit# City State Zip   Telephone Number Email Address   Signature of Parent or Legal Guardian Today’s Date  12/18/2024     Warning: Any misuse of the disability parking placard/plates or making a false application may result in the revocation of the placard, a 12-month suspension or revocation of your drivers license, and a fine of up to $1,000.    Temporary Disabled Parking Placard Applications -- May be taken to any [facility] facility or mailed in.  Permanent Disables Parking Placard Applications -- MUST be mailed to the following address:  , Persons with Disabilities Placard Unit, [address] [place] [address], [address], [place] [address].    *If you have a permanent disability placard and would like a Persons with Disabilities License Plate, please visit your local  facility to apply. You will need your permanent placard number and current plate number or CHIDI.     Please complete Page 2 to ensure timely processing.    Printed by authority of the [facility]. July 2021 -- 1 -- VSD 62.28          Part 3: Medical Eligibility Standards and Medical Professionals Certification  As the medical professional(s) executing this document and verifying the nature of the applicant's disability, I understand that making a false representation of a person's  disability for the purposes of obtaining any type of a disabled parking placard may result in suspension or revocation of my license and a fine of up to $1,000. As a licensed physician, advanced practice nurse, optometrist, chiropractor or physician's assistant, I certify the applicant has a condition that constitutes him/her as a person with disabilities.   Length of Disability: (Check one)   []   Temporary Disability; the duration of this disability is ___________________ (maximum 6 months)  [x]   Permanent Disability  []   Meter-Exempt Disability (Must complete and sign Part 4 also.)  Check all that apply (MUST check at least one):  []  Is restricted by a lung disease to such a degree that the person’s forced (respiratory) expiratory volume (FEV) for 1 second, when measured by spirometry, is less than 1 liter.  []   Uses a portable oxygen device.  []   Has a Class III or Class IV cardiac condition according to the standards set by the American Heart Association.  [x]   Cannot walk without the use of or assistance from a wheelchair, a walker, a crutch, a brace, a prosthetic device or another person.  []   Is severely limited in the ability to walk due to an arthritic, neurological, oncological or orthopedic condition.  []   Cannot walk 200 feet without stopping to rest because of one of the above five conditions.  Check all that apply: (MUST check at least one diagnosis):  []Amputation of extremity(s)_________________ [] Arthritis of the ____knees______________  []Spina Bifida     []Osteoarthritis of the __________________   []Multiple Sclerosis    [] Chronic Pain due to ___________________  []Quadriplegia/Paraplegia   [] Legally Blind with limited mobility  [] Cerebral Palsy  [] Other Diagnosis: _________________________________________________________________    If none of the above conditions apply, list the medical condition that impacts the person’s mobility.     Medical Professional’s Printed Name*   Shelby  Neelam Garduno MD Specialty  Internal medicine   Office Address   [facility], [address]  [facility] [address]  Dept: [phone]  Dept Fax: [phone]   Medical Professional’s Signature     State Professional License Number (NOT NPI#)  [id number] Today’s Date                  12/18/2024    Signature of Collaborating/Supervising Physician (if signed above by resident/assistant) Supervising State Professional License Number             PART 4: Medical Eligibility for Meter-Exempt Parking  The meter-exempt parking certification must be completed only when the applicant qualifies. To qualify, the applicant MUST have a VALID  [place] 's license, have an ambulatory disability described in Part 3, and also have one of the following conditions listed below.  Economic need is not a consideration for meter-exempt parking    The applicant is eligible for meter-exempt parking as provided by statue due to the following PERMANENT medical condition or disability:    Check all that apply:  [] Cannot manage, manipulate, or insert coins, or obtain tickets in parking meters/ticket machines due to lack of fine motor control of BOTH hands.  [] Cannot reach above his/her head to a height of 42 inches from the ground due to a lack of finger, hand or upper-extremity strength or mobility.  [] Cannot approach a parking meter due to his/her use of a wheelchair or other device for mobility.  [] Cannot walk more than 20 feet due to an orthopedic, neurological, cardiovascular, or lung condition in which the degree of debilitation is so severe that it almost completely impedes the ability to walk.  [] Missing a hand(s) or arm(s) or has permanently lost the use of a hand or arm.  [] Patient is under 18 years of age and incapable of driving.     Medical Professional’s Signature State Professional License Number (NOT NPI#)   Today’s Date                  12/18/2024    Signature of  Collaborating/Supervising Physician (if signed above by resident/assistant) Supervising State Professional License Number     FOR  OFFICE USE ONLY     Parking Placard Number:  Expiration Date:   Issued By: Issued Date: